# Patient Record
Sex: MALE | Race: BLACK OR AFRICAN AMERICAN | NOT HISPANIC OR LATINO | Employment: OTHER | URBAN - METROPOLITAN AREA
[De-identification: names, ages, dates, MRNs, and addresses within clinical notes are randomized per-mention and may not be internally consistent; named-entity substitution may affect disease eponyms.]

---

## 2017-12-08 ENCOUNTER — GENERIC CONVERSION - ENCOUNTER (OUTPATIENT)
Dept: OTHER | Facility: OTHER | Age: 52
End: 2017-12-08

## 2017-12-11 ENCOUNTER — ALLSCRIPTS OFFICE VISIT (OUTPATIENT)
Dept: OTHER | Facility: CLINIC | Age: 52
End: 2017-12-11

## 2017-12-11 ENCOUNTER — GENERIC CONVERSION - ENCOUNTER (OUTPATIENT)
Dept: OTHER | Facility: OTHER | Age: 52
End: 2017-12-11

## 2017-12-12 ENCOUNTER — TRANSCRIBE ORDERS (OUTPATIENT)
Dept: LAB | Facility: CLINIC | Age: 52
End: 2017-12-12

## 2017-12-12 ENCOUNTER — APPOINTMENT (OUTPATIENT)
Dept: LAB | Facility: CLINIC | Age: 52
End: 2017-12-12
Payer: MEDICARE

## 2017-12-12 DIAGNOSIS — B20 HUMAN IMMUNODEFICIENCY VIRUS I INFECTION (HCC): Primary | ICD-10-CM

## 2017-12-12 LAB
ALBUMIN SERPL BCP-MCNC: 3.8 G/DL (ref 3.5–5)
ALP SERPL-CCNC: 84 U/L (ref 46–116)
ALT SERPL W P-5'-P-CCNC: 30 U/L (ref 12–78)
ANION GAP SERPL CALCULATED.3IONS-SCNC: 4 MMOL/L (ref 4–13)
AST SERPL W P-5'-P-CCNC: 25 U/L (ref 5–45)
BACTERIA UR QL AUTO: NORMAL /HPF
BASOPHILS # BLD AUTO: 0.02 THOUSANDS/ΜL (ref 0–0.1)
BASOPHILS NFR BLD AUTO: 1 % (ref 0–1)
BILIRUB SERPL-MCNC: 0.42 MG/DL (ref 0.2–1)
BILIRUB UR QL STRIP: NEGATIVE
BUN SERPL-MCNC: 10 MG/DL (ref 5–25)
CALCIUM SERPL-MCNC: 9.2 MG/DL (ref 8.3–10.1)
CHLORIDE SERPL-SCNC: 107 MMOL/L (ref 100–108)
CHOLEST SERPL-MCNC: 170 MG/DL (ref 50–200)
CLARITY UR: CLEAR
CO2 SERPL-SCNC: 31 MMOL/L (ref 21–32)
COLOR UR: YELLOW
CREAT SERPL-MCNC: 0.77 MG/DL (ref 0.6–1.3)
EOSINOPHIL # BLD AUTO: 0.65 THOUSAND/ΜL (ref 0–0.61)
EOSINOPHIL NFR BLD AUTO: 24 % (ref 0–6)
ERYTHROCYTE [DISTWIDTH] IN BLOOD BY AUTOMATED COUNT: 19 % (ref 11.6–15.1)
EST. AVERAGE GLUCOSE BLD GHB EST-MCNC: 74 MG/DL
GFR SERPL CREATININE-BSD FRML MDRD: 121 ML/MIN/1.73SQ M
GLUCOSE P FAST SERPL-MCNC: 72 MG/DL (ref 65–99)
GLUCOSE UR STRIP-MCNC: NEGATIVE MG/DL
HBA1C MFR BLD: 4.2 % (ref 4.2–6.3)
HCT VFR BLD AUTO: 33.6 % (ref 36.5–49.3)
HDLC SERPL-MCNC: 61 MG/DL (ref 40–60)
HGB BLD-MCNC: 11.3 G/DL (ref 12–17)
HGB UR QL STRIP.AUTO: NEGATIVE
HIV 1+2 AB+HIV1 P24 AG SERPL QL IA: REACTIVE
HIV1 P24 AG SER QL: ABNORMAL
HYALINE CASTS #/AREA URNS LPF: NORMAL /LPF
KETONES UR STRIP-MCNC: NEGATIVE MG/DL
LDLC SERPL CALC-MCNC: 95 MG/DL (ref 0–100)
LEUKOCYTE ESTERASE UR QL STRIP: NEGATIVE
LYMPHOCYTES # BLD AUTO: 0.62 THOUSANDS/ΜL (ref 0.6–4.47)
LYMPHOCYTES NFR BLD AUTO: 23 % (ref 14–44)
MCH RBC QN AUTO: 30.4 PG (ref 26.8–34.3)
MCHC RBC AUTO-ENTMCNC: 33.6 G/DL (ref 31.4–37.4)
MCV RBC AUTO: 90 FL (ref 82–98)
MONOCYTES # BLD AUTO: 0.21 THOUSAND/ΜL (ref 0.17–1.22)
MONOCYTES NFR BLD AUTO: 8 % (ref 4–12)
NEUTROPHILS # BLD AUTO: 1.16 THOUSANDS/ΜL (ref 1.85–7.62)
NEUTS SEG NFR BLD AUTO: 44 % (ref 43–75)
NITRITE UR QL STRIP: NEGATIVE
NON-SQ EPI CELLS URNS QL MICRO: NORMAL /HPF
NRBC BLD AUTO-RTO: 0 /100 WBCS
PH UR STRIP.AUTO: 6.5 [PH] (ref 4.5–8)
PLATELET # BLD AUTO: 155 THOUSANDS/UL (ref 149–390)
PMV BLD AUTO: 11.3 FL (ref 8.9–12.7)
POTASSIUM SERPL-SCNC: 3.9 MMOL/L (ref 3.5–5.3)
PROT SERPL-MCNC: 8.9 G/DL (ref 6.4–8.2)
PROT UR STRIP-MCNC: NEGATIVE MG/DL
RBC # BLD AUTO: 3.72 MILLION/UL (ref 3.88–5.62)
RBC #/AREA URNS AUTO: NORMAL /HPF
SODIUM SERPL-SCNC: 142 MMOL/L (ref 136–145)
SP GR UR STRIP.AUTO: 1.01 (ref 1–1.03)
TRIGL SERPL-MCNC: 70 MG/DL
UROBILINOGEN UR QL STRIP.AUTO: 0.2 E.U./DL
WBC # BLD AUTO: 2.66 THOUSAND/UL (ref 4.31–10.16)
WBC #/AREA URNS AUTO: NORMAL /HPF

## 2017-12-12 PROCEDURE — 86317 IMMUNOASSAY INFECTIOUS AGENT: CPT

## 2017-12-12 PROCEDURE — 87340 HEPATITIS B SURFACE AG IA: CPT

## 2017-12-12 PROCEDURE — 80061 LIPID PANEL: CPT

## 2017-12-12 PROCEDURE — 87591 N.GONORRHOEAE DNA AMP PROB: CPT

## 2017-12-12 PROCEDURE — 86361 T CELL ABSOLUTE COUNT: CPT

## 2017-12-12 PROCEDURE — 86480 TB TEST CELL IMMUN MEASURE: CPT

## 2017-12-12 PROCEDURE — 87806 HIV AG W/HIV1&2 ANTB W/OPTIC: CPT

## 2017-12-12 PROCEDURE — 87901 NFCT AGT GNTYP ALYS HIV1 REV: CPT

## 2017-12-12 PROCEDURE — 85025 COMPLETE CBC W/AUTO DIFF WBC: CPT

## 2017-12-12 PROCEDURE — 86701 HIV-1ANTIBODY: CPT

## 2017-12-12 PROCEDURE — 36415 COLL VENOUS BLD VENIPUNCTURE: CPT

## 2017-12-12 PROCEDURE — 86592 SYPHILIS TEST NON-TREP QUAL: CPT

## 2017-12-12 PROCEDURE — 81001 URINALYSIS AUTO W/SCOPE: CPT

## 2017-12-12 PROCEDURE — 80053 COMPREHEN METABOLIC PANEL: CPT

## 2017-12-12 PROCEDURE — 86645 CMV ANTIBODY IGM: CPT

## 2017-12-12 PROCEDURE — 86706 HEP B SURFACE ANTIBODY: CPT

## 2017-12-12 PROCEDURE — 87536 HIV-1 QUANT&REVRSE TRNSCRPJ: CPT

## 2017-12-12 PROCEDURE — 86709 HEPATITIS A IGM ANTIBODY: CPT

## 2017-12-12 PROCEDURE — 86803 HEPATITIS C AB TEST: CPT

## 2017-12-12 PROCEDURE — 87900 PHENOTYPE INFECT AGENT DRUG: CPT

## 2017-12-12 PROCEDURE — 86702 HIV-2 ANTIBODY: CPT

## 2017-12-12 PROCEDURE — 87491 CHLMYD TRACH DNA AMP PROBE: CPT

## 2017-12-12 PROCEDURE — 83036 HEMOGLOBIN GLYCOSYLATED A1C: CPT

## 2017-12-12 PROCEDURE — 86644 CMV ANTIBODY: CPT

## 2017-12-12 PROCEDURE — 81381 HLA I TYPING 1 ALLELE HR: CPT

## 2017-12-13 ENCOUNTER — TRANSCRIBE ORDERS (OUTPATIENT)
Dept: LAB | Facility: CLINIC | Age: 52
End: 2017-12-13

## 2017-12-13 LAB
CMV IGG SERPL IA-ACNC: >10 U/ML (ref 0–0.59)
CMV IGM SERPL IA-ACNC: <30 AU/ML (ref 0–29.9)
HAV IGM SER QL: NORMAL
HBV SURFACE AB SER-ACNC: 9.68 MIU/ML
HBV SURFACE AG SER QL: NORMAL
HCV AB SER QL: NORMAL
RPR SER QL: NORMAL

## 2017-12-14 LAB
ANNOTATION COMMENT IMP: ABNORMAL
BASOPHILS # BLD AUTO: 0 X10E3/UL (ref 0–0.2)
BASOPHILS NFR BLD AUTO: 1 %
C TETANI IGG SER IA-ACNC: 0.67 IU/ML
CD3+CD4+ CELLS # BLD: 2 /UL (ref 359–1519)
CD3+CD4+ CELLS NFR BLD: 0.3 % (ref 30.8–58.5)
CHLAMYDIA DNA CVX QL NAA+PROBE: NORMAL
EOSINOPHIL # BLD AUTO: 0.6 X10E3/UL (ref 0–0.4)
EOSINOPHIL NFR BLD AUTO: 24 %
ERYTHROCYTE [DISTWIDTH] IN BLOOD BY AUTOMATED COUNT: 19.5 % (ref 12.3–15.4)
GAMMA INTERFERON BACKGROUND BLD IA-ACNC: 0.02 IU/ML
HCT VFR BLD AUTO: 33.6 % (ref 37.5–51)
HGB BLD-MCNC: 10.8 G/DL (ref 13–17.7)
HIV 2 AB SERPL QL IA: NEGATIVE
HIV 2 AB SERPL QL IA: NEGATIVE
HIV1 AB SERPL QL IA: POSITIVE
HIV1 AB SERPL QL IA: POSITIVE
HIV1 RNA # SERPL NAA+PROBE: NORMAL COPIES/ML
HIV1 RNA SERPL NAA+PROBE-LOG#: 4.68 LOG10COPY/ML
IMM GRANULOCYTES # BLD: 0 X10E3/UL (ref 0–0.1)
IMM GRANULOCYTES NFR BLD: 0 %
LYMPHOCYTES # BLD AUTO: 0.6 X10E3/UL (ref 0.7–3.1)
LYMPHOCYTES NFR BLD AUTO: 22 %
M TB IFN-G BLD-IMP: ABNORMAL
M TB IFN-G CD4+ BCKGRND COR BLD-ACNC: 0 IU/ML
M TB IFN-G CD4+ T-CELLS BLD-ACNC: 0.02 IU/ML
MCH RBC QN AUTO: 30 PG (ref 26.6–33)
MCHC RBC AUTO-ENTMCNC: 32.1 G/DL (ref 31.5–35.7)
MCV RBC AUTO: 93 FL (ref 79–97)
MITOGEN IGNF BLD-ACNC: 0.49 IU/ML
MONOCYTES # BLD AUTO: 0.2 X10E3/UL (ref 0.1–0.9)
MONOCYTES NFR BLD AUTO: 8 %
MORPHOLOGY BLD-IMP: ABNORMAL
N GONORRHOEA DNA GENITAL QL NAA+PROBE: NORMAL
NEUTROPHILS # BLD AUTO: 1.2 X10E3/UL (ref 1.4–7)
NEUTROPHILS NFR BLD AUTO: 45 %
PLATELET # BLD AUTO: 146 X10E3/UL (ref 150–379)
QUANTIFERON-TB GOLD IN TUBE: NORMAL
RBC # BLD AUTO: 3.6 X10E6/UL (ref 4.14–5.8)
SERVICE CMNT-IMP: ABNORMAL
SL AMB NOTE:: ABNORMAL
SL AMB NOTE:: ABNORMAL
WBC # BLD AUTO: 2.6 X10E3/UL (ref 3.4–10.8)

## 2017-12-18 ENCOUNTER — GENERIC CONVERSION - ENCOUNTER (OUTPATIENT)
Dept: OTHER | Facility: OTHER | Age: 52
End: 2017-12-18

## 2017-12-18 ENCOUNTER — ALLSCRIPTS OFFICE VISIT (OUTPATIENT)
Dept: OTHER | Facility: CLINIC | Age: 52
End: 2017-12-18

## 2017-12-18 LAB — HLA-B*57:01 SPEC QL: NEGATIVE

## 2017-12-19 ENCOUNTER — GENERIC CONVERSION - ENCOUNTER (OUTPATIENT)
Dept: OTHER | Facility: OTHER | Age: 52
End: 2017-12-19

## 2017-12-19 ENCOUNTER — ALLSCRIPTS OFFICE VISIT (OUTPATIENT)
Dept: OTHER | Facility: CLINIC | Age: 52
End: 2017-12-19

## 2017-12-21 LAB
HIV GENOSURE: NORMAL
HIV RT+PR MUT DET ISLT: NORMAL

## 2017-12-27 ENCOUNTER — APPOINTMENT (EMERGENCY)
Dept: RADIOLOGY | Facility: HOSPITAL | Age: 52
End: 2017-12-27
Payer: MEDICARE

## 2017-12-27 ENCOUNTER — HOSPITAL ENCOUNTER (EMERGENCY)
Facility: HOSPITAL | Age: 52
Discharge: HOME | End: 2017-12-27
Attending: EMERGENCY MEDICINE | Admitting: EMERGENCY MEDICINE
Payer: MEDICARE

## 2017-12-27 VITALS
SYSTOLIC BLOOD PRESSURE: 111 MMHG | WEIGHT: 155 LBS | TEMPERATURE: 98.1 F | RESPIRATION RATE: 18 BRPM | DIASTOLIC BLOOD PRESSURE: 71 MMHG | HEART RATE: 90 BPM | OXYGEN SATURATION: 99 %

## 2017-12-27 DIAGNOSIS — G89.4 CHRONIC PAIN SYNDROME: Primary | ICD-10-CM

## 2017-12-27 PROCEDURE — 71020 HB CHEST X-RAY 2VW FRONTAL&LATL: CPT

## 2017-12-27 PROCEDURE — 99285 EMERGENCY DEPT VISIT HI MDM: CPT

## 2017-12-27 PROCEDURE — 93005 ELECTROCARDIOGRAM TRACING: CPT

## 2017-12-27 NOTE — DISCHARGE INSTRUCTIONS
Diagnosis: chronic shoulder pain     - activity as tolerated     - please call your primary doctor to schedule an appt  For the chronic shoulder pain ----  If you do not have a doctor - please call   The infolink number to get  Assistance    - please return to  The er f or any new/ worsening/concerning symptoms to you

## 2017-12-27 NOTE — ED PROCEDURE NOTE
PROCEDURE  ECG 12 Lead Documentation  Date/Time: 12/27/2017 12:20 PM  Performed by: Dorothy Olson  Authorized by: Trinity TODD     Indications / Diagnosis:   ms cp  ECG reviewed by me, the ED Provider: yes    Patient location:  ED and bedside  Previous ECG:     Previous ECG:  Unavailable  Interpretation:     Interpretation: non-specific    Rate:     ECG rate:  85    ECG rate assessment: normal    Rhythm:     Rhythm: sinus rhythm    Ectopy:     Ectopy: none    QRS:     QRS axis:  Normal    QRS intervals:  Normal  Conduction:     Conduction: normal    ST segments:     ST segments:  Normal  T waves:     T waves: flattening      Flattening:  II, III, aVF, V1 and V5  Q waves:     Q waves:  V1  Other findings:     Other findings: U wave    Comments:      No ecg  Signs of ischemia/ injury /  r heart strain/ babak/pericarditis

## 2017-12-27 NOTE — ED PROVIDER NOTES
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 and is being followed by infectious disease  He states he was recently admitted in November for a PE and DVT in his left leg  He was started on Eliquis after discharge, but recently changed to Pradaxa a week ago due to HIV medication interactions  Patient states the leg swelling, which is bilateral and above the knee to his hips, has worsened since discharge, but denies any pain  He has a history of being non-compliant with medications, but states he has not missed any pradaxa doses  He describes the chest pain as starting in his shoulders and radiating to the center of his chest  He has RA and states the pain is similar to what he has had in the past, but worsened last night  He has had improvement with heat and epsom salt soaks  He has been experiencing cough and SOB, but states this is his baseline due to asthma  He denies any fevers, abdominal pain, N/V/D, or other complaints at this time  None       Past Medical History:   Diagnosis Date    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 ([facility] [address] )     Hypertension     Stroke [facility])        Past Surgical History:   Procedure Laterality Date    HIP SURGERY         History reviewed  No pertinent family history  I have reviewed and agree with the history as documented  Social History   Substance Use Topics    Smoking status: Current Some Day Smoker    Smokeless tobacco: Not on file    Alcohol use Yes        Review of Systems   Constitutional: Negative for chills and fever  HENT: Negative for congestion, ear pain, sinus pain and sore throat  Respiratory: Positive for cough and shortness of breath  Negative for wheezing  Cardiovascular: Positive for leg swelling  Negative for chest pain  Gastrointestinal: Negative for abdominal pain, diarrhea, nausea and vomiting  Genitourinary: Negative  Musculoskeletal: Positive for arthralgias and myalgias  Skin: Negative  Allergic/Immunologic: Positive for immunocompromised state  Neurological: Negative for dizziness, syncope, weakness and headaches  Psychiatric/Behavioral: Negative  Physical Exam  ED Triage Vitals [12/27/17 1056]   Temperature Pulse Respirations Blood Pressure SpO2   98 1 °F (36 7 °C) 90 18 111/71 99 %      Temp Source Heart Rate Source Patient Position - Orthostatic VS BP Location FiO2 (%)   Oral Monitor Sitting Right arm --      Pain Score       --           Orthostatic Vital Signs  Vitals:    12/27/17 1056   BP: 111/71   Pulse: 90   Patient Position - Orthostatic VS: Sitting       Physical Exam   Constitutional: He is oriented to person, place, and time  Vital signs are normal  He appears well-developed  He appears cachectic  HENT:   Head: Normocephalic and atraumatic  Eyes: EOM are normal  Pupils are equal, round, and reactive to light  Neck: Normal range of motion  Neck supple  Cardiovascular: Normal rate, regular rhythm, normal heart sounds and intact distal pulses  Exam reveals no gallop and no friction rub  No murmur heard  Pulmonary/Chest: Effort normal and breath sounds normal  No respiratory distress  He has no wheezes  He has no rales  He exhibits tenderness  Abdominal: Soft  Bowel sounds are normal  He exhibits no distension  There is no tenderness  There is no rebound and no guarding  Musculoskeletal: Normal range of motion  Neurological: He is alert and oriented to person, place, and time  Skin: Skin is warm and dry  Psychiatric: He has a normal mood and affect   His behavior is normal  Judgment and thought content normal        ED Medications  Medications - No data to display    Diagnostic Studies  Results Reviewed     None                 XR chest pa & lateral    (Results Pending)              Procedures  ECG 12 Lead Documentation  Date/Time: 12/27/2017 11:56 AM  Performed by: Da Cleaning  Authorized by: Jovi TODD     ECG reviewed by me, the ED Provider: yes    Patient location:  ED  Previous ECG:     Previous ECG:  Unavailable  Interpretation:     Interpretation: normal    Rate:     ECG rate:  85    ECG rate assessment: normal    Rhythm:     Rhythm: sinus rhythm    Ectopy:     Ectopy: none    QRS:     QRS axis:  Normal    QRS intervals:  Normal  Conduction:     Conduction: normal    ST segments:     ST segments:  Normal  T waves:     T waves: non-specific and flattening      Flattening:  II, III and aVF             Phone Contacts  ED Phone Contact    ED Course  ED Course          MDM  Number of Diagnoses or Management Options  Chronic pain syndrome:      Amount and/or Complexity of Data Reviewed  Tests in the radiology section of CPT®: ordered and reviewed  Review and summarize past medical records: yes  Independent visualization of images, tracings, or specimens: yes    Risk of Complications, Morbidity, and/or Mortality  Presenting problems: low  Diagnostic procedures: low  Management options: low    Patient Progress  Patient progress: stable    CritCare Time    Disposition  Final diagnoses:   Chronic pain syndrome     Time reflects when diagnosis was documented in both MDM as applicable and the Disposition within this note     Time User Action Codes Description Comment    12/27/2017 12:29 PM Lucia Keys Add [G89 4] Chronic pain syndrome       ED Disposition     ED Disposition Condition Comment    Discharge  Zay Taveras discharge to home/self care  Condition at discharge: Good        Follow-up Information    None       There are no discharge medications for this patient  No discharge procedures on file      ED Provider  Electronically Signed by           Colonel Isis PA-C  12/27/17 0486

## 2017-12-28 LAB
ATRIAL RATE: 89 BPM
MISCELLANEOUS LAB TEST RESULT: NORMAL
P AXIS: 73 DEGREES
PR INTERVAL: 142 MS
QRS AXIS: -7 DEGREES
QRSD INTERVAL: 94 MS
QT INTERVAL: 370 MS
QTC INTERVAL: 440 MS
T WAVE AXIS: 21 DEGREES
VENTRICULAR RATE: 85 BPM

## 2018-01-08 ENCOUNTER — ALLSCRIPTS OFFICE VISIT (OUTPATIENT)
Dept: OTHER | Facility: CLINIC | Age: 53
End: 2018-01-08

## 2018-01-16 ENCOUNTER — APPOINTMENT (OUTPATIENT)
Dept: LAB | Facility: CLINIC | Age: 53
End: 2018-01-16
Payer: COMMERCIAL

## 2018-01-16 ENCOUNTER — TRANSCRIBE ORDERS (OUTPATIENT)
Dept: LAB | Facility: CLINIC | Age: 53
End: 2018-01-16

## 2018-01-16 DIAGNOSIS — B20 HUMAN IMMUNODEFICIENCY VIRUS (HIV) DISEASE (HCC): ICD-10-CM

## 2018-01-16 LAB
ALBUMIN SERPL BCP-MCNC: 4.4 G/DL (ref 3.5–5)
ALP SERPL-CCNC: 80 U/L (ref 46–116)
ALT SERPL W P-5'-P-CCNC: 22 U/L (ref 12–78)
ANION GAP SERPL CALCULATED.3IONS-SCNC: 5 MMOL/L (ref 4–13)
AST SERPL W P-5'-P-CCNC: 20 U/L (ref 5–45)
BASOPHILS # BLD AUTO: 0.02 THOUSANDS/ΜL (ref 0–0.1)
BASOPHILS NFR BLD AUTO: 0 % (ref 0–1)
BILIRUB SERPL-MCNC: 0.52 MG/DL (ref 0.2–1)
BUN SERPL-MCNC: 13 MG/DL (ref 5–25)
CALCIUM SERPL-MCNC: 9.8 MG/DL (ref 8.3–10.1)
CHLORIDE SERPL-SCNC: 105 MMOL/L (ref 100–108)
CO2 SERPL-SCNC: 29 MMOL/L (ref 21–32)
CREAT SERPL-MCNC: 1.07 MG/DL (ref 0.6–1.3)
EOSINOPHIL # BLD AUTO: 1.32 THOUSAND/ΜL (ref 0–0.61)
EOSINOPHIL NFR BLD AUTO: 29 % (ref 0–6)
ERYTHROCYTE [DISTWIDTH] IN BLOOD BY AUTOMATED COUNT: 17 % (ref 11.6–15.1)
GFR SERPL CREATININE-BSD FRML MDRD: 92 ML/MIN/1.73SQ M
GLUCOSE P FAST SERPL-MCNC: 81 MG/DL (ref 65–99)
HCT VFR BLD AUTO: 35.8 % (ref 36.5–49.3)
HGB BLD-MCNC: 12.5 G/DL (ref 12–17)
LYMPHOCYTES # BLD AUTO: 1 THOUSANDS/ΜL (ref 0.6–4.47)
LYMPHOCYTES NFR BLD AUTO: 22 % (ref 14–44)
MCH RBC QN AUTO: 31.4 PG (ref 26.8–34.3)
MCHC RBC AUTO-ENTMCNC: 34.9 G/DL (ref 31.4–37.4)
MCV RBC AUTO: 90 FL (ref 82–98)
MONOCYTES # BLD AUTO: 0.21 THOUSAND/ΜL (ref 0.17–1.22)
MONOCYTES NFR BLD AUTO: 5 % (ref 4–12)
NEUTROPHILS # BLD AUTO: 1.95 THOUSANDS/ΜL (ref 1.85–7.62)
NEUTS SEG NFR BLD AUTO: 44 % (ref 43–75)
NRBC BLD AUTO-RTO: 0 /100 WBCS
PLATELET # BLD AUTO: 226 THOUSANDS/UL (ref 149–390)
PMV BLD AUTO: 10.3 FL (ref 8.9–12.7)
POTASSIUM SERPL-SCNC: 4 MMOL/L (ref 3.5–5.3)
PROT SERPL-MCNC: 9.2 G/DL (ref 6.4–8.2)
RBC # BLD AUTO: 3.98 MILLION/UL (ref 3.88–5.62)
SODIUM SERPL-SCNC: 139 MMOL/L (ref 136–145)
WBC # BLD AUTO: 4.51 THOUSAND/UL (ref 4.31–10.16)

## 2018-01-16 PROCEDURE — 36415 COLL VENOUS BLD VENIPUNCTURE: CPT

## 2018-01-16 PROCEDURE — 86708 HEPATITIS A ANTIBODY: CPT

## 2018-01-16 PROCEDURE — 80053 COMPREHEN METABOLIC PANEL: CPT

## 2018-01-16 PROCEDURE — 86361 T CELL ABSOLUTE COUNT: CPT

## 2018-01-16 PROCEDURE — 86778 TOXOPLASMA ANTIBODY IGM: CPT

## 2018-01-16 PROCEDURE — 86777 TOXOPLASMA ANTIBODY: CPT

## 2018-01-16 PROCEDURE — 85025 COMPLETE CBC W/AUTO DIFF WBC: CPT

## 2018-01-16 PROCEDURE — 87536 HIV-1 QUANT&REVRSE TRNSCRPJ: CPT

## 2018-01-17 LAB
BASOPHILS # BLD AUTO: 0 X10E3/UL (ref 0–0.2)
BASOPHILS NFR BLD AUTO: 1 %
CD3+CD4+ CELLS # BLD: 40 /UL (ref 359–1519)
CD3+CD4+ CELLS NFR BLD: 4 % (ref 30.8–58.5)
CLINICAL COMMENT: NORMAL
EOSINOPHIL # BLD AUTO: 1.2 X10E3/UL (ref 0–0.4)
EOSINOPHIL NFR BLD AUTO: 30 %
ERYTHROCYTE [DISTWIDTH] IN BLOOD BY AUTOMATED COUNT: 18 % (ref 12.3–15.4)
HAV AB SER QL IA: NORMAL
HCT VFR BLD AUTO: 37.7 % (ref 37.5–51)
HGB BLD-MCNC: 13.1 G/DL (ref 13–17.7)
IMM GRANULOCYTES # BLD: 0 X10E3/UL (ref 0–0.1)
IMM GRANULOCYTES NFR BLD: 0 %
LYMPHOCYTES # BLD AUTO: 1 X10E3/UL (ref 0.7–3.1)
LYMPHOCYTES NFR BLD AUTO: 24 %
MCH RBC QN AUTO: 32 PG (ref 26.6–33)
MCHC RBC AUTO-ENTMCNC: 34.7 G/DL (ref 31.5–35.7)
MCV RBC AUTO: 92 FL (ref 79–97)
MONOCYTES # BLD AUTO: 0.2 X10E3/UL (ref 0.1–0.9)
MONOCYTES NFR BLD AUTO: 5 %
MORPHOLOGY BLD-IMP: ABNORMAL
NEUTROPHILS # BLD AUTO: 1.6 X10E3/UL (ref 1.4–7)
NEUTROPHILS NFR BLD AUTO: 40 %
PLATELET # BLD AUTO: 204 X10E3/UL (ref 150–379)
RBC # BLD AUTO: 4.1 X10E6/UL (ref 4.14–5.8)
T GONDII IGG SERPL IA-ACNC: <3 IU/ML (ref 0–7.1)
T GONDII IGM SER IA-ACNC: <3 AU/ML (ref 0–7.9)
WBC # BLD AUTO: 4 X10E3/UL (ref 3.4–10.8)

## 2018-01-18 LAB
HIV1 RNA # SERPL NAA+PROBE: 60 COPIES/ML
HIV1 RNA SERPL NAA+PROBE-LOG#: 1.78 LOG10COPY/ML

## 2018-01-22 VITALS
WEIGHT: 149.31 LBS | TEMPERATURE: 98 F | DIASTOLIC BLOOD PRESSURE: 82 MMHG | SYSTOLIC BLOOD PRESSURE: 110 MMHG | HEART RATE: 88 BPM | HEIGHT: 71 IN | BODY MASS INDEX: 20.9 KG/M2

## 2018-01-22 VITALS
HEART RATE: 110 BPM | SYSTOLIC BLOOD PRESSURE: 130 MMHG | HEIGHT: 71 IN | WEIGHT: 156.38 LBS | BODY MASS INDEX: 21.89 KG/M2 | OXYGEN SATURATION: 97 % | TEMPERATURE: 97.9 F | DIASTOLIC BLOOD PRESSURE: 80 MMHG

## 2018-01-23 VITALS
HEIGHT: 71 IN | BODY MASS INDEX: 20.32 KG/M2 | HEART RATE: 87 BPM | DIASTOLIC BLOOD PRESSURE: 70 MMHG | WEIGHT: 145.13 LBS | OXYGEN SATURATION: 97 % | SYSTOLIC BLOOD PRESSURE: 128 MMHG | TEMPERATURE: 97.4 F

## 2018-01-23 NOTE — PROGRESS NOTES
Assessment    1  AIDS (042) (B20)   2  Pulmonary embolism (415 19) (I26 99)   3  Gastritis (535 50) (K29 70)    Plan    1  Eliquis 5 MG Oral Tablet; Take 1 tablet twice daily    2  Magnesium Oxide 400 MG Oral Tablet; TAKE 1 TABLET Every 8 hours    3  Cyclobenzaprine HCl - 10 MG Oral Tablet; TAKE 1 TABLET TWICE DAILY AS   NEEDED   4  Tylenol 325 MG Oral Tablet; TAKE 2 TABLET Every 6 hours PRN for headache, pain,   fever    5  Azithromycin 600 MG Oral Tablet; TAKE 2 TABLETS ONCE WEEKLY   6  Sulfamethoxazole-Trimethoprim 800-160 MG Oral Tablet; TAKE 1 TABLET BY   MOUTH DAILY ON MONDAY, WEDNESDAY, AND FRIDAY    Discussion/Summary  Discussion Summary:   Jocy Quiroz is a 47year old male here today for one week f/u PCP visit  AIDS: CD4 2 VL 48,230  Not currently on ART  Previous ART in 12/15/15 was Norvir, Prezista, and Truvada  Medication confirmed from previous ID records    Scheduled for ID clinic tomorrow  Educated to contact clinic immediately if he develops a fever or experiences respiratory distress  Low tolerance for sending to ED for medical treatment  Repeat CXR when he receives medical benefits  DVT/PE: Continue apixaban  Anemia: Will need additional lab studies as viral load decreases    Refer to heme/onc when he obtains insurance  HTN: Stable  /70 Using mother's wrist BP cuff at home and it is most likely not accurate  Will reassess tomorrow at ID clinic  Encouraged to quit smoking  Right hip pain: 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  Will need to have right hip replacement  For now will provide symptomatic treatment  Cyclobenzaprine has been effective in controlling pain in the past  Hesitant to offer narcotics due to patient's recent drug history  Protein calorie malnutrition: BMI 20 82  Encouraged a healthy well balanced diet  Refer to dietician for additional education  Weight loss may be a factor of AIDS wasting   Will most likely see improvement after pt starts ART  Given samples of Nutritional supplements today  Nicotine dependence: Educated on the dangers of smoking  Advised to quit  referred to [address] for enrollment in smoking cessation program      PCP f/u scheduled for three weeks              Counseling Documentation With Imm: The patient was counseled regarding instructions for management, risks and benefits of treatment options, importance of compliance with treatment  Medication SE Review and Pt Understands Tx: Possible side effects of new medications were reviewed with the patient/guardian today  The treatment plan was reviewed with the patient/guardian  The patient/guardian understands and agrees with the treatment plan      Chief Complaint  Chief Complaint Free Text Note Form: Per pt he took his BP yesterday morning and it was 152/106 (left arm) and 163/120 (right arm)  Pt c/o increased SOB when climbing steps x 2-3 days  Pt c/o pain and swelling bilateral legs x 2 months  Pt c/o facial itching x 2 months  History of Present Illness  HPI: Lana Hung is a 47year old male here today for one week PCP f/u  PMHx signifcant for AIDs, PE, anemnia, asthma, and HTN  Per pt he took his BP yesterday morning and it was 152/106 (left arm) and 163/120 (right arm)  Also c/o increased SOB when climbing steps x 2-3 days  Ramya Alpha is c/o pain and swelling bilateral legs x 2 months  Ramya Alpha is also c/o facial itching x 2 months  Hospital Based Practices Required Assessment:   Pain Assessment   the patient states they have pain  The pain is located in the bilateral legs  The patient describes the pain as aching  (on a scale of 0 to 10, the patient rates the pain at 9 )   Abuse And Domestic Violence Screen    Yes, the patient is safe at home  The patient states no one is hurting them  Depression And Suicide Screen  No, the patient has not had thoughts of hurting themself  No, the patient has not felt depressed in the past 7 days  Active Problems     1  Acute pain (338 19) (R52)   2  Anemia (285 9) (D64 9)   3  Arthritis (716 90) (M19 90)   4  Asthma (493 90) (J45 909)   5  DVT (deep venous thrombosis) (453 40) (I82 409)   6  Gastritis (535 50) (K29 70)   7  Hip pain, right (719 45) (M25 551)   8  HIV disease (042) (B20)   9  Hypertension (401 9) (I10)   10  Hypoalbuminemia (273 8) (E88 09)   11  Hypokalemia (276 8) (E87 6)   12  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)   13  Nicotine dependence (305 1) (F17 200)   14  Pneumonia (5) (J18 9)   15  Pulmonary embolism (415 19) (I26 99)    AIDS (042) (B20)       Protein-calorie malnutrition (263 9) (E46)          Past Medical History    1  History of Closed fracture of left hip with malunion, subsequent encounter (733 81)   (S72 002P)   2  History of bursitis (V13 59) (Z87 39)   3  History of candidiasis of mouth (V12 09) (Z86 19)   4  History of erectile dysfunction (V13 89) (Z87 438)   5  History of Mycobacterium avium complex infection (V12 09) (Z86 19)   6  History of shingles (V12 09) (Z86 19)   7  History of stroke (V12 54) (Z86 73)   8  History of Obstructive sleep apnea, adult (327 23) (G47 33)    Family History  Mother    1  Family history of diabetes mellitus (V18 0) (Z83 3)   2  Family history of hypertension (V17 49) (Z82 49)  Father    3  Family history of hepatic cirrhosis (V18 59) (Z83 79)   4  Family history of malignant neoplasm of prostate (V16 42) (Z80 42)  Uncle    5  Family history of tuberculosis (V18 8) (Z83 1)    Social History    · Cocaine use (305 60) (F14 90)   · Current every day smoker (305 1) (F17 200)    Surgical History    1  History of Hip Replacement Left    Current Meds   1  Azithromycin 600 MG Oral Tablet; TAKE 2 TABLETS ONCE WEEKLY; Therapy: (Recorded:77Rnn4442) to Recorded   2  Eliquis 5 MG Oral Tablet; Take 1 tablet twice daily; Therapy: (Recorded:76Amy4135) to Recorded   3  Magnesium Oxide 400 MG Oral Tablet; TAKE 1 TABLET Every 8 hours;    Therapy: (Recorded:59Wiz1216) to Recorded   4  Sulfamethoxazole-TMP DS TABS; 1 tablet 3 times per week; Therapy: (Recorded:19Kya7704) to Recorded    Allergies    1  Penicillins    2  Food   3  TOMATO    Vitals  Signs   Recorded: 36DYE4326 01:12PM   Temperature: 97 4 F  Heart Rate: 87  Systolic: 255  Diastolic: 70  Height: 5 ft 11 in  Weight: 145 lb 2 oz  BMI Calculated: 20 24  BSA Calculated: 1 84  O2 Saturation: 97    Results/Data  Longitudinal Record 59Zqe8518 04:11PM      Test Name Result Flag Reference   HLA- negative         Attending Note  Collaborating Physician Note: Agree with Advanced Practitioner Note Except: ID appt today, will d/w CRNP  ? need for CXR given S/S  Fortunately 97%  Recheck BP normal  Close monitoring        Future Appointments    Date/Time Provider Specialty Site   12/19/2017 05:30 PM Stanley Avitia MD Infectious Disease ASC AT [facility]   01/08/2018 10:30 AM MANDY Iniguez Epidemiology/Public Health ASC AT [address] [facility]   Electronically signed by : Arun Ortega; Dec 18 2017  5:21PM EST                       (Author)    Electronically signed by : Deuce Da Silva DO; Dec 19 2017 12:48PM EST                       (Author)

## 2018-01-23 NOTE — PROGRESS NOTES
Assessment    1  AIDS (042) (B20)   2  Protein-calorie malnutrition (263 9) (E46)   3  Underweight (783 22) (R63 6)    Discussion/Summary    Intervention Diet Prescription   Energy 1950 kcal   25kcal /78kg   Protein 85g   1 1g /78kg   Fluid 1950ml   25ml /78kg 2 0g Na IBW of 78kg used for nutritional needs at this time due to AIDS status  Estimated Intake: 1700kcal 65g Protein    Goal #1 - Improve/Maintain  weight, protein and energy intake  Goal initiated  Time Frame For Accomplishment: By next follow-up  Intervention Nutrition Education Provided  Person Educated: patient   Topics Discussed: Oral Supplement, protein and Adequate energy intake   Barriers To Learning: economic  Readiness to Learn: Marginal Reception  Teaching Method: verbal   Evaluation Of Learning: needs reinforcement   Jaleel Faulkner was seen today for his initial assessment, new to care at clinic  Currently at 66kg, PT is at 84% of his IBW of 78kg, moderate deficit  PT reports feeling fatigued  Discussed use of oral supplements  Jaleel Faulkner indicates getting Boost VHP while in the hospital  Currently not taking any supplements  Provided sample bag of oral supplements (and coupons) for Jaleel Faulkner to use in conjunction with his normal nutrient intake from foods  Dietary recall indicates Barak's appetite is good  Has recently applied for food stamps and was given emergency stamps for this month  Food allergies to tomatoes and pineapple  Will continue to monitor wt, intake, need for supplements as PT begins ART therapy  History of Present Illness  Assessment: Clinical Data/Client History HIV - No  AIDS - Yes  His socio-economic status includes  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  He lives in [facility] house  Living Environment - He has access to refrigerator, stove and microwave  Psycho-Social factors are  substance abuse  His functional status is  ambulatory, able to food shop, prepares own meals and Lives with brother and mother who also cook     His activity level is  fatigued  He eats breakfast at  Oatmeal, grits with butter, toast, eggs, sausage, coffee or hot chocolate  He eats lunch at  Cereal or leftover food from dinner, iced tea, orange or apple  He eats dinner at  Chicken or steak, corn or broccoli, rice, water  His appetite is  fair   He takes supplements  He has problems with  PT has not had dental care since 1989, will follow up with CM  Nutrition Diagnosis   Underweight r/t AIDS as evidenced by protein-calorie malnutrition, 84% of IBW moderate deficit  Active Problems    1  Acute pain (338 19) (R52)   2  AIDS (042) (B20)   3  Anemia (285 9) (D64 9)   4  Arthritis (716 90) (M19 90)   5  Asthma (493 90) (J45 909)   6  DVT (deep venous thrombosis) (453 40) (I82 409)   7  Gastritis (535 50) (K29 70)   8  Hip pain, right (719 45) (M25 551)   9  HIV disease (042) (B20)   10  Hypertension (401 9) (I10)   11  Hypoalbuminemia (273 8) (E88 09)   12  Hypokalemia (276 8) (E87 6)   13  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)   14  Nicotine dependence (305 1) (F17 200)   15  Pneumonia (486) (J18 9)   16  Protein-calorie malnutrition (263 9) (E46)   17  Pulmonary embolism (415 19) (I26 99)    Past Medical History    1  History of Closed fracture of left hip with malunion, subsequent encounter (733 81) (S72 002P)   2  History of bursitis (V13 59) (Z87 39)   3  History of candidiasis of mouth (V12 09) (Z86 19)   4  History of erectile dysfunction (V13 89) (Z87 438)   5  History of Mycobacterium avium complex infection (V12 09) (Z86 19)   6  History of shingles (V12 09) (Z86 19)   7  History of stroke (V12 54) (Z86 73)   8  History of Obstructive sleep apnea, adult (327 23) (G47 33)    Surgical History    1  History of Hip Replacement Left    Family History  Mother    1  Family history of diabetes mellitus (V18 0) (Z83 3)   2  Family history of hypertension (V17 49) (Z82 49)  Father    3  Family history of hepatic cirrhosis (V18 59) (Z83 79)   4   Family history of malignant neoplasm of prostate (V16 42) (Z80 42)  Uncle    5  Family history of tuberculosis (V18 8) (Z83 1)    Social History    · Cocaine use (305 60) (F14 90)   · Current every day smoker (305 1) (F17 200)    Current Meds   1  Azithromycin 600 MG Oral Tablet; TAKE 2 TABLETS ONCE WEEKLY  Requested for: 66TXF9297;   Last Rx:[id number] Ordered   2  Cyclobenzaprine HCl - 10 MG Oral Tablet; TAKE 1 TABLET TWICE DAILY AS NEEDED; Therapy: 04CLB6849 to (Evaluate:17Jan2018)  Requested for: 57AGW1194; Last Rx:99Lun9340   Ordered   3  Eliquis 5 MG Oral Tablet; Take 1 tablet twice daily  Requested for: 80QFG1681; Last Rx:88Okm5346   Ordered   4  Magnesium Oxide 400 MG Oral Tablet; TAKE 1 TABLET Every 8 hours; Last Rx:24Ahy6096 Ordered   5  Sulfamethoxazole-Trimethoprim 800-160 MG Oral Tablet; TAKE 1 TABLET BY MOUTH DAILY ON   MONDAY, WEDNESDAY, AND FRIDAY  Requested for: 48LNR7389; Last Rx:[id number] Ordered   6  Tylenol 325 MG Oral Tablet; TAKE 2 TABLET Every 6 hours PRN for headache, pain, fever; Therapy: 85TZZ6438 to (Evaluate:17Jan2018)  Requested for: 73XGF1655; Last Rx:16Gck1752   Ordered    Allergies    1  Penicillins    2  Food   3  TOMATO    Vitals  Vitals   Recorded: 83XSC0961 30:38US   Systolic: 859  Diastolic: 70  Temperature: 97 4 F  Heart Rate: 87  Height: 5 ft 11 in  Weight: 145 lb 2 oz  BMI Calculated: 20 24  BSA Calculated: 1 84  O2 Saturation: 97  Recorded: 67BVP8359 86:71YI   Systolic: 455  Diastolic: 82  Temperature: 98 F  Heart Rate: 88  Height: 5 ft 11 in  Weight: 149 lb 5 oz  BMI Calculated: 20 82  BSA Calculated: 1 86    Future Appointments    Date/Time Provider Specialty Site   12/19/2017 05:30 PM Geovanna Guerra MD Infectious Disease ASC AT [facility]   01/08/2018 10:30 AM MANDY [place] Epidemiology/Public Health [place] [address] AT [address] [facility]   Electronically signed by :  JARETT Acharya; Dec 18 2017  4:07PM EST                       (Author)

## 2018-01-23 NOTE — PROGRESS NOTES
Assessment    1  Insomnia (780 52) (G47 00)   2  DVT (deep venous thrombosis) (453 40) (I82 409)   3  AIDS (042) (B20)   4  Hip pain, right (719 45) (M25 551)   5  Asthma (493 90) (J45 909)   6  Nicotine dependence (305 1) (F17 200)   7  Protein-calorie malnutrition (263 9) (E46)    Plan    1  Descovy 200-25 MG Oral Tablet; Take 1 tablet daily   2  Prezcobix 800-150 MG Oral Tablet; take 1 tablet by mouth daily   3  Tivicay 50 MG Oral Tablet; take 1 tablet by mouth daily    4  Ventolin  (90 Base) MCG/ACT Inhalation Aerosol Solution; INHALE 1 TO 2   PUFF(S) EVERY 4-6 HOURS AS NEEDED    5  Pradaxa 150 MG Oral Capsule; TAKE 1 CAPSULE TWICE DAILY    6  Magnesium Oxide 400 MG Oral Tablet; TAKE 1 TABLET Every 8 hours    7  Cyclobenzaprine HCl - 10 MG Oral Tablet; TAKE 1 TABLET TWICE DAILY AS   NEEDED   8  Tylenol 325 MG Oral Tablet; TAKE 2 TABLET Every 6 hours PRN for headache,   pain, fever    9  Azithromycin 600 MG Oral Tablet; TAKE 2 TABLETS ONCE WEEKLY   10  Sulfamethoxazole-Trimethoprim 800-160 MG Oral Tablet; TAKE 1 TABLET BY    MOUTH DAILY ON MONDAY, WEDNESDAY, AND FRIDAY    11  DiphenhydrAMINE HCl - 25 MG Oral Tablet; Take 1-2 tablets at night    Discussion/Summary  Discussion Summary:   Gerald Castillo is a 47year old male here today 4 week PCP f/u of chronic conditions  AIDS: CD4 2 VL 48,230  Started Tivicay and Descovy 12/19/17  Continue OI prophylaxis Bactrim and Azithromycin  Denies side effects and is tolerating medication well  Denies missing doses  Having difficulty getting to local pharmacy due to transportation issues  From now on will use [facility] pharmacy and medication will be delivered to his home  Stressed the importance of adherence  Reminded to have labs completed 1/16/18 and attend scheduled f/u ID appointment 1/30/18  DVT/PE: Anticoagulation changed to Pradaxa due to contraindications with combination of Eliquis and ART  Doing well on Pradaxa and denies side effects   Evaluated at [facility] 12/27/2017 for chest pain  ECG sinus rhythm without ischemia, and Bertha Landrum was discharged home  Instructed to continue with Pradaxa  Anemia: Will need additional lab studies as viral load decreases    Refer to heme/onc when he obtains insurance  HTN: Stable  /80  Educated to eat a low-salt diet  Encouraged to quit smoking  Right hip pain: 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  Will need to have right hip replacement  For now will provide symptomatic treatment  Cyclobenzaprine has been effective in controlling pain in the past  Continue to use acetaminophen for pain relief  Educated to avoid NSAIDs due to risk of internal bleeding when combined with Pradaxa  Refer to orthopedics as CD4 stabilizes  Protein calorie malnutrition: Recent 10lb weight gain  BMI 21 81  Encouraged a healthy well balanced diet  Refer to dietician for additional education  Weight loss may be a factor of AIDS wasting  Will most likely see improvement as pt continues ART  Nicotine dependence: Greatly reduced number of cigarette smoked  Encouraged continued progress towards cessation goals   Educated on the dangers of smoking, especially with the comorbidities of hypertension and asthma    Advised to quit  Continue to follow [address]la [address] in smoking cessation program      Asthma: Ordered a PRN Ventolin inhaler  If cough does not improve, will send for PFTs and pursue additional treatment  Insomnia/facial rash: Ordered Benadryl at HS  PCP f/u scheduled for three weeks              Counseling Documentation With Imm: The patient was counseled regarding diagnostic results, instructions for management, risk factor reductions, risks and benefits of treatment options, importance of compliance with treatment  Medication SE Review and Pt Understands Tx: Possible side effects of new medications were reviewed with the patient/guardian today   The treatment plan was reviewed with the patient/guardian  The patient/guardian understands and agrees with the treatment plan      Chief Complaint  Chief Complaint Free Text Note Form: Pt c/o runny nose and productive cough x 1 1/2 weeks  History of Present Illness  HPI: Bhavani Louis Is a 36-year-old male who presents to the clinic today for primary care follow-up of chronic conditions  past medical history significant for AIDS, asthma, DVT, right hip pain, hypertension, nicotine dependence, moderate protein calorie malnutrition, and insomnia  Derian Barnard c/o runny nose and productive cough x 1 1/2 weeks  Symptoms are slowly improving with supportive measures  Derian Barnard also continues to c/o R hip pain that makes it difficult for him to sleep at night  Hospital Based Practices Required Assessment:   Pain Assessment   the patient states they have pain  The pain is located in the bilateral shoulders and whole right side of body  The patient describes the pain as aching  (on a scale of 0 to 10, the patient rates the pain at 9 )   Abuse And Domestic Violence Screen    Yes, the patient is safe at home  The patient states no one is hurting them  Depression And Suicide Screen  No, the patient has not had thoughts of hurting themself  No, the patient has not felt depressed in the past 7 days  Review of Systems  Focused-Male:   Constitutional: feeling tired, but no fever, not feeling poorly and no chills  ENT: nasal discharge, but no earache, no nosebleeds, no sore throat, no hearing loss and no hoarseness  Cardiovascular: lower extremity edema, but the heart rate was not slow, no chest pain, no intermittent leg claudication, the heart rate was not fast and no palpitations  Respiratory: cough, but no shortness of breath and no wheezing  Gastrointestinal: no complaints of abdominal pain, no constipation, no nausea or vomiting, no diarrhea or bloody stools     Genitourinary: no complaints of dysuria or incontinence, no hesitancy, no nocturia, no genital lesion, no inadequacy of penile erection  Musculoskeletal: limb pain, joint stiffness and limb swelling, but no joint swelling  Integumentary: a rash, itching and pruritic facial rash, slowly improving , but no skin lesions and no skin wound  Neurological: no complaints of headache, no confusion, no numbness or tingling, no dizziness or fainting  Active Problems    1  Acute pain (338 19) (R52)   2  AIDS (042) (B20)   3  Anemia (285 9) (D64 9)   4  Arthritis (716 90) (M19 90)   5  Asthma (493 90) (J45 909)   6  DVT (deep venous thrombosis) (453 40) (I82 409)   7  Gastritis (535 50) (K29 70)   8  Hip pain, right (719 45) (M25 551)   9  HIV disease (042) (B20)   10  Hypertension (401 9) (I10)   11  Hypoalbuminemia (273 8) (E88 09)   12  Hypokalemia (276 8) (E87 6)   13  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)   14  Nicotine dependence (305 1) (F17 200)   15  Night sweats (780 8) (R61)   16  Pneumonia (486) (J18 9)   17  Protein-calorie malnutrition (263 9) (E46)   18  Pulmonary embolism (415 19) (I26 99)   19  Underweight (783 22) (R63 6)    Past Medical History    1  History of Closed fracture of left hip with malunion, subsequent encounter (733 81)   (S72 002P)   2  History of bursitis (V13 59) (Z87 39)   3  History of candidiasis of mouth (V12 09) (Z86 19)   4  History of erectile dysfunction (V13 89) (Z87 438)   5  History of Mycobacterium avium complex infection (V12 09) (Z86 19)   6  History of shingles (V12 09) (Z86 19)   7  History of stroke (V12 54) (Z86 73)   8  History of Obstructive sleep apnea, adult (327 23) (G47 33)  Active Problems And Past Medical History Reviewed: The active problems and past medical history were reviewed and updated today  Family History  Mother    1  Family history of diabetes mellitus (V18 0) (Z83 3)   2  Family history of hypertension (V17 49) (Z82 49)  Father    3  Family history of hepatic cirrhosis (V18 59) (Z83 79)   4   Family history of malignant neoplasm of prostate (V16 42) (Z80 42)  Uncle    5  Family history of tuberculosis (V18 8) (Z83 1)  Family History Reviewed: The family history was reviewed and updated today  Social History    · Cocaine use (305 60) (F14 90)   · Current every day smoker (305 1) (F17 200)  Social History Reviewed: The social history was reviewed and updated today  The social history was reviewed and is unchanged  Surgical History    1  History of Hip Replacement Left  Surgical History Reviewed: The surgical history was reviewed and updated today  Current Meds   1  Azithromycin 600 MG Oral Tablet; TAKE 2 TABLETS ONCE WEEKLY  Requested for:   14UVG1620; Last Rx:[id number] Ordered   2  Cyclobenzaprine HCl - 10 MG Oral Tablet; TAKE 1 TABLET TWICE DAILY AS NEEDED; Therapy: 13XHF6152 to (Evaluate:17Jan2018)  Requested for: 70WPL3328; Last   Rx:[id number] Ordered   3  Descovy 200-25 MG Oral Tablet; Take 1 tablet daily; Therapy: 07OOC3874 to (Evaluate:18Apr2018)  Requested for: 06BJO4233; Last   Rx:[id number] Ordered   4  Magnesium Oxide 400 MG Oral Tablet; TAKE 1 TABLET Every 8 hours; Last   Rx:[id number] Ordered   5  Pradaxa 150 MG Oral Capsule; TAKE 1 CAPSULE TWICE DAILY; Therapy: 59ONS2391 to (Evaluate:19Mar2018)  Requested for: 33XJP9784; Last   Rx:[id number] Ordered   6  Prezcobix 800-150 MG Oral Tablet; take 1 tablet by mouth daily; Therapy: 08CIF7960 to (Evaluate:18Apr2018)  Requested for: 90AOC0009; Last   Rx:[id number] Ordered   7  Sulfamethoxazole-Trimethoprim 800-160 MG Oral Tablet; TAKE 1 TABLET BY MOUTH   DAILY ON MONDAY, WEDNESDAY, AND FRIDAY  Requested for: 77ARM0520; Last   Rx:[id number] Ordered   8  Tivicay 50 MG Oral Tablet; take 1 tablet by mouth daily; Therapy: 40IWR9923 to (Evaluate:17Jun2018)  Requested for: 05VIA7870; Last   Rx:[id number] Ordered   9  Tylenol 325 MG Oral Tablet; TAKE 2 TABLET Every 6 hours PRN for headache, pain, fever;    Therapy: 41HPD3910 to (Evaluate:17Jan2018)  Requested for: 69EBZ6052; Last   Rx:[id number] Ordered  Medication List Reviewed: The medication list was reviewed and updated today  Allergies    1  Penicillins    2  Food   3  TOMATO    Vitals  Signs   Recorded: 99ARR1056 10:28AM   Temperature: 97 9 F  Heart Rate: 431  Systolic: 134  Diastolic: 80  Height: 5 ft 11 in  Weight: 156 lb 6 oz  BMI Calculated: 21 81  BSA Calculated: 1 9  O2 Saturation: 97    Physical Exam    Constitutional   General appearance: Abnormal   chronically ill, underweight, clothing appropriate, well groomed, appears tired and appears older than stated age  Eyes   Conjunctiva and lids: No swelling, erythema or discharge  Pupils and irises: Equal, round and reactive to light  Ears, Nose, Mouth, and Throat   External inspection of ears and nose: Normal     Otoscopic examination: Tympanic membranes translucent with normal light reflex  Canals patent without erythema  Nasal mucosa, septum, and turbinates: Normal without edema or erythema  Oropharynx: Normal with no erythema, edema, exudate or lesions  Pulmonary   Respiratory effort: Abnormal   Respiratory rate: normal  Assessment of respiratory effort revealed normal rhythm and effort  Respiratory Findings: dry cough  Auscultation of lungs: Clear to auscultation  Cardiovascular   Auscultation of heart: Normal rate and rhythm, normal S1 and S2, without murmurs  slight nonpitting edema in b/l thighs  R worse then left  Abdomen   Abdomen: Non-tender, no masses  Liver and spleen: No hepatomegaly or splenomegaly  Lymphatic   Palpation of lymph nodes in neck: No lymphadenopathy  Musculoskeletal   Gait and station: Normal     Digits and nails: Normal without clubbing or cyanosis  Inspection/palpation of joints, bones, and muscles: Abnormal   Appearance - no swelling and normal spinal curvature  Palpation - bilateral hip tenderness     Skin   Skin and subcutaneous tissue: Abnormal   (scattered macro papular rash on face, improving) dry skin  Clinical impression: eczema  Scalp and Hair: thin hair  Psychiatric   Orientation to person, place, and time: Normal     Mood and affect: Normal        Attending Note  Collaborating Physician Note: Collaborating Physician: I agree with the Advanced Practitioner note        Future Appointments    Date/Time Provider Specialty Site   01/30/2018 04:45 PM Yuly Fagan MD Infectious Disease [facility] AT [facility]   02/05/2018 11:30 AM MANDY Luong Epidemiology/Public Health [facility] AT [id number] [place] [phone]   Electronically signed by : Román Harrison; Jan 8 2018 12:34PM EST                       (Author)    Electronically signed by : Frantz Leon DO; Jan 9 2018  2:12PM EST                       (Author)

## 2018-01-23 NOTE — PROGRESS NOTES
Assessment    1  AIDS (042) (B20)   2  Anemia (285 9) (D64 9)   3  DVT (deep venous thrombosis) (453 40) (I82 409)   4  Hypertension (401 9) (I10)   5  Pulmonary embolism (415 19) (I26 99)   6  Protein-calorie malnutrition (263 9) (E46)   7  Nicotine dependence (305 1) (F17 200)    Plan  HIV disease    · Flulaval Quadrivalent Intramuscular Suspension    Discussion/Summary    Pat Johnson is a 47year old male here today for initial PCP visit  AIDS: CD4 3 VL 45,490  Not currently on ART  Unable to accurately state previous ART  Await previous ID records  Per patient report previous treated with Atripla and then Norvir, Prezista, and TIvicay  Pt  is a poor historian  Continue current OI prophylaxis  WIll consult with ID regarding immediate treatment plan  Scheduled for ID clinic next week, but did not complete requested labs  Due to lack of insurance coverage and transportation issues getting lab work completed is a challenge  Working closely with case management to get SBPB  Educated to contact clinic immediately if he develops a fever or experiences respiratory distress  Low tolerance for sending to ED for medical treatment  Repeat CXR when he receives medical benefits  DVT/PE: Continue apixaban  Will need SBPB in order to renew medication  Referral to heme/onc when he obtains medical insurance  Anemia: Will need additional lab studies when he obtains insurance  Refer to heme/onc when he obtains insurance  HTN: Stable  /82  Encouraged to quit smoking  Protein calorie malnutrition: BMI 20 82  Encouraged a healthy well balanced diet  Refer to dietician for additional education  Weight loss may be a factor of AIDS wasting  Will most likely see improvement after pt starts ART  Nicotine dependence: Educated on the dangers of smoking  Advised to quit  referred to [address] for enrollment in smoking cessation program      PCP f/u scheduled for one week             Possible side effects of new medications were reviewed with the patient/guardian today  The treatment plan was reviewed with the patient/guardian  The patient/guardian understands and agrees with the treatment plan   The patient was counseled regarding instructions for management, risk factor reductions, risks and benefits of treatment options  Education   general HIV education  adherence  prevention care  Chief Complaint  Pt c/o BLE and swelling pain 1mth and R hip pain ongoing for 2 years  Pt reports need hip replacement      History of Present Illness  Mr Zay Taveras Is a 50-year-old  man who presents to the clinic today for initial primary care visit  PMHx significant for AIDS, anemia, asthma, and HTN  Pt reports PSHx of L hip replacement  Suleman Herring also states he had a CVA in 2012 and 2013  And a MI in 2014  At this time medical records have not been received, so pt  report can not be verified  Suleman Herring was dx with HIV in 2013  Transmission of virus was through heterosexual contact  Denies IV drug use or MSM  Per pt  report Suleman Herring was treated at [facility] ID clinic by Dr Kenisha Ramirez  Unsure of initial CD4 count or VL  Started on Atripla and later changed to Norvir, Prezista, and Tivicay  Suleman Herring stopped receiving care in 2015 due to hectic work schedule  Suleman Herring was hospitalized 9/27/17-10/3/17 at [address] in [facility] for PCP PNU  CXR positive for linear opacities and patchy opacities b/l suspicious for areas of infiltrate  CT of the chest showed multi focal patchy airspace opacities ranging from tree in bud opacities to nodular consolidation  Treated with IV levofloxacin and vancomycin  AFB cultures negative X 3  Cryptococcal Ag and Legionella negative  TB ruled out  Did have positive PPD test, but hx of latent TB treatment in 1970 after uncle dx with TB  CD4 3 VL 45,950  Initial blood Cx contaminated with skin cole, repeat blood cx negative  MRI of brain negative for acute infarct, mass, or lesions  Received fluconazole for acute oral candida infection, that has since resolved  D/C on Bactrim and Azithromycin for OI prophylaxis  ART was not started  Hospitalized at [facility] and d/c 11/22/17 for DVT  Started on apixaban and scheduled for f/u ID appointment on 12/4/17  Did not go to f/u appointment because [facility] moved to [facility] to be close to family  [facility]  [facility] and [facility] pain X one month and R hip pain ongoing for 2 years   [facility] states he was told two years ago that he needed a right hip replacement  He also states he has SOB on exertion and fatigue   [facility] reports dry, itchy skin  He is on his last day of medication   [facility] currently does not have medical insurance   [facility] is sexually active with one female partner and always uses condoms  Reports recent cocaine use one month ago  He has stopped using drugs since moving to [place]   [facility] also smokes 2-3 cigarettes a day  He resides with his mother and has support from his brother  [facility] is on SSD and has a monthly income of $5  Pain Assessment   the patient states they have pain  The pain is located in the hip pain leg pain  (on a scale of 0 to 10, the patient rates the pain at 9 )   Abuse And Domestic Violence Screen    Yes, the patient is safe at home  The patient states no one is hurting them  Depression And Suicide Screen  No, the patient has not had thoughts of hurting themself  No, the patient has not felt depressed in the past 7 days  Prefered Language is  [place]  Primary Language is  English  The patient is being seen for an initial evaluation of an existing diagnosis of HIV infection  Symptoms:  fatigue, headache, myalgias and arthralgias, but no fever, no chills, no sore throat, no mouth ulcers, no genital ulcers and no anal ulcers    no rash  Associated symptoms:  weight loss, shortness of breath and cough, but no nausea, no vomiting, no diarrhea, no confusion and no depression       SUBSTANCE ABUSE: not using ETOH  not using drugs  SMOKING: He is a current smoker, uses ciagrettes, 2 cigarettes per day packs per day and for 38 years  SEXUALLY ACTIVE: He is sexually active, having oral sex and having vaginal sex, but not having anal sex  He always uses condoms  He has had 1 partners in the last 90 days  HOUSING: He has stable housing  There are 3 people living in the household (including children)  The household income is $$690  HEALTH MAINTENANCE: His last dental exam was 12  His last eye exam was 2 yeras ago  Her last pap smear was N/A  Her last mammogram was N/A  Review of Systems    Eyes: eyesight problems and itching of the eyes, but no eye pain, no dryness of the eyes, eyes not red and no purulent discharge from the eyes  ENT: no earache, no nosebleeds, no sore throat, no hearing loss, no nasal discharge and no hoarseness  Cardiovascular: chest pain and lower extremity edema, but the heart rate was not slow, no intermittent leg claudication, the heart rate was not fast and no palpitations  Respiratory: shortness of breath, cough and shortness of breath during exertion, but no wheezing  Gastrointestinal: no abdominal pain, no nausea, no vomiting, no constipation, no diarrhea and no blood in stools  Genitourinary: no dysuria, no urinary hesitancy, no genital lesions, no incontinence, no nocturia and no testicular pain  Musculoskeletal: arthralgias, joint swelling, limb pain, myalgias, joint stiffness and limb swelling  Integumentary: itching and dry skin, but no rashes, no skin lesions and no skin wound  Neurological: headache, numbness, tingling and dizziness, but no confusion, no limb weakness, no convulsions, no fainting and no difficulty walking  Psychiatric: sleep disturbances, but not suicidal, no anxiety, no depression and no emotional problems  Active Problems    1  Acute pain (338 19) (R52)   2  AIDS (042) (B20)   3  Anemia (285 9) (D64 9)   4   Asthma (493 90) (J45 909)   5  DVT (deep venous thrombosis) (453 40) (I82 409)   6  HIV disease (042) (B20)   7  Hypertension (401 9) (I10)   8  Hypoalbuminemia (273 8) (E88 09)   9  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)   10  Pneumonia (486) (J18 9)   11  Protein-calorie malnutrition (263 9) (E46)   12  Pulmonary embolism (415 19) (I26 99)    Past Medical History    1  History of candidiasis of mouth (V12 09) (Z86 19)   2  History of shingles (V12 09) (Z86 19)    The active problems and past medical history were reviewed and updated today  Surgical History    1  History of Hip Replacement Left    The surgical history was reviewed and updated today  Family History  Mother    1  Family history of diabetes mellitus (V18 0) (Z83 3)   2  Family history of hypertension (V17 49) (Z82 49)  Father    3  Family history of hepatic cirrhosis (V18 59) (Z83 79)   4  Family history of malignant neoplasm of prostate (V16 42) (Z80 42)  Uncle    5  Family history of tuberculosis (V18 8) (Z83 1)    Social History    · Cocaine use (305 60) (F14 90)   · Current every day smoker (305 1) (F17 200)  The social history was reviewed and updated today  The social history was reviewed and is unchanged  Current Meds   1  Azithromycin 600 MG Oral Tablet; TAKE 2 TABLETS ONCE WEEKLY; Therapy: (Recorded:54Kkb5164) to Recorded   2  Eliquis 5 MG Oral Tablet; Take 1 tablet twice daily; Therapy: (Recorded:08Dec2017) to Recorded   3  Magnesium Oxide 400 MG Oral Tablet; TAKE 1 TABLET Every 8 hours; Therapy: (Recorded:44Nhv6204) to Recorded   4  Oxycodone-Acetaminophen 5-325 MG Oral Tablet; PRN; Therapy: (Recorded:90Ums6825) to Recorded   5  Sulfamethoxazole-TMP DS TABS; 1 tablet 3 times per week; Therapy: (Recorded:20Tif7494) to Recorded    The medication list was reviewed and updated today  Allergies    1  Penicillins    2  Food   3   TOMATO    Vitals  Signs   Recorded: 11Dec2017 09:52AM   Temperature: 98 F  Heart Rate: 88  Systolic: 360  Diastolic: 82  Height: 5 ft 11 in  Weight: 149 lb 5 oz  BMI Calculated: 20 82  BSA Calculated: 1 86    Physical Exam    Constitutional   General appearance: Abnormal   chronically ill, underweight, clothing appropriate, well groomed, appears tired and appears older than stated age  Eyes   Conjunctiva and lids: No swelling, erythema or discharge  Pupils and irises: Equal, round and reactive to light  Ears, Nose, Mouth, and Throat   External inspection of ears and nose: Normal     Otoscopic examination: Tympanic membranes translucent with normal light reflex  Canals patent without erythema  Nasal mucosa, septum, and turbinates: Normal without edema or erythema  Oropharynx: Normal with no erythema, edema, exudate or lesions  Pulmonary   Respiratory effort: Abnormal   Respiratory rate: normal  Assessment of respiratory effort revealed normal rhythm and effort  Respiratory Findings: dry cough  Auscultation of lungs: Abnormal   Auscultation of the lungs revealed decreased breath sounds diffusely  rales/crackles over both bases  no wheezing  Cardiovascular   Auscultation of heart: Normal rate and rhythm, normal S1 and S2, without murmurs  Examination of extremities for edema and/or varicosities: Normal     Abdomen   Abdomen: Non-tender, no masses  Liver and spleen: No hepatomegaly or splenomegaly  Lymphatic   Palpation of lymph nodes in neck: No lymphadenopathy  Musculoskeletal   Gait and station: Normal     Digits and nails: Normal without clubbing or cyanosis  Inspection/palpation of joints, bones, and muscles: Abnormal   Appearance - no swelling and normal spinal curvature  Palpation - bilateral hip tenderness  Skin   Skin and subcutaneous tissue: Abnormal   dry skin  Clinical impression: eczema  Scalp and Hair: thin hair  Psychiatric   Orientation to person, place, and time: Normal     Mood and affect: Normal     Lips, Teeth and Gums: The lips were normal with no lesions  Examination of the teeth revealed poor dentition, dental caries and missing teeth  Examination of the gingiva showed no abnormalities  Attending Note  Collaborating Physician: I agree with the Advanced Practitioner note        Future Appointments    Date/Time Provider Specialty Site   12/19/2017 05:30 PM Neelam Amaya MD Infectious Disease ASC AT [facility]   12/18/2017 10:00 AM MANDY Vasquez Epidemiology/Public Health ASC AT [address] [place] [address]   Electronically signed by : Volodymyr Santos; Dec 11 2017  9:57PM EST                       (Author)    Electronically signed by : Ziyad Olguin DO; Dec 12 2017 10:50AM EST                       (Author)

## 2018-01-23 NOTE — PROGRESS NOTES
History of Present Illness    Assessment:  Initial encounter with PT  Nutrition Diagnosis No Nutrition Diagnosis Now   Monitor And Evaluation Goal #1 - Monitor for wt loss, Goal #1 is extended  Initial encounter with Cheng Ellison today, new to clinic for care  # (67 7kg) # (currently at 86% of his IBW)  Cheng Ellison indicates moving here to help take care of his mother and focus on improving his health status  Continue to monitor his wt status and other nutritional parameters  Will receive PCP care here as well  Cheng Ellison did not identify any immediate nutrition concerns and indicates having adequate access to food at this time  Will do complete intake assessment at upcoming PCP visit  Active Problems    1  Acute pain (338 19) (R52)   2  AIDS (042) (B20)   3  Anemia (285 9) (D64 9)   4  Asthma (493 90) (J45 909)   5  DVT (deep venous thrombosis) (453 40) (I82 409)   6  HIV disease (042) (B20)   7  Hypertension (401 9) (I10)   8  Hypoalbuminemia (273 8) (E88 09)   9  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)   10  Pneumonia (486) (J18 9)   11  Protein-calorie malnutrition (263 9) (E46)   12  Pulmonary embolism (415 19) (I26 99)    Past Medical History    1  History of candidiasis of mouth (V12 09) (Z86 19)   2  History of shingles (V12 09) (Z86 19)    Surgical History    1  History of Hip Replacement Left    Family History  Mother    1  Family history of diabetes mellitus (V18 0) (Z83 3)   2  Family history of hypertension (V17 49) (Z82 49)  Father    3  Family history of hepatic cirrhosis (V18 59) (Z83 79)   4  Family history of malignant neoplasm of prostate (V16 42) (Z80 42)  Uncle    5  Family history of tuberculosis (V18 8) (Z83 1)    Social History    · Cocaine use (305 60) (F14 90)   · Current every day smoker (305 1) (F17 200)    Current Meds   1  Azithromycin 600 MG Oral Tablet; TAKE 2 TABLETS ONCE WEEKLY; Therapy: (Recorded:81Qyz0033) to Recorded   2  Eliquis 5 MG Oral Tablet;  Take 1 tablet twice daily; Therapy: (Recorded:19Wcw6523) to Recorded   3  Magnesium Oxide 400 MG Oral Tablet; TAKE 1 TABLET Every 8 hours; Therapy: (Recorded:67Nmw3274) to Recorded   4  Oxycodone-Acetaminophen 5-325 MG Oral Tablet; PRN; Therapy: (Recorded:93Mfg9805) to Recorded   5  Sulfamethoxazole-TMP DS TABS; 1 tablet 3 times per week; Therapy: (Recorded:76Yao4909) to Recorded    Allergies    1  Penicillins    2  Food   3  TOMATO    Future Appointments    Date/Time Provider Specialty Site   12/19/2017 05:30 PM Allyssa Quintero MD Infectious Disease ASC AT [facility]   12/18/2017 10:00 AM MANDY Mayen Epidemiology/Public Health ASC AT [address] [facility]   Electronically signed by :  JARETT Cowan; Dec 11 2017 11:45AM EST                       (Author)

## 2018-01-23 NOTE — PROGRESS NOTES
History of Present Illness  [facility]: The patient is being seen regarding completing annual [facility] screener   [facility] Profile[facility]:         1  I like who I am  Yes, describes me exactly (12)   2  I am not an easy person to get along with    Somewhat describes me (21)   3  I am basically a healthy person    Yes, describes me exactly (32)   4  I give up to easily    Somewhat describes me (41)   5  I have difficulty concentrating    No, doesn't describe me at all (52)   6  I am happy with my family relationships    Yes, describes me exactly (62)   7  I am comfortable being around people    Yes, describes me exactly (72)     8  Walking up a flight of stairs    [place] A Lot (80)   9  Running the length of a football field    [place] A Lot (90)     10  Sleeping    Some (101)   11  Hurting or aching in any part of your body    [place] A Lot (110)   12  Getting tired easily    [place] A Lot (120)   13  Feeling depressed or sad    None (132)   14  Nervousness    Some (141)     15  Socialize with other people (talk or visit with friends or relatives A Lot ()   12  Take part in social, Rastafari, or recreation activities (meetings, Hinduism, movies, sports, parties)    None (160)   17  Stay in your home, nursing home, or hospital because of sickness, injury, or other health problem None (172)   [address]     8 = 0   9 = 0   10 = 1   11 = 0   12 = 0   Sum = 1   PHYSICAL HEALTH SCORE 10      1 = 2   4 = 1   5 = 2   13 = 2   14 = 1   Sum = 8   MENTAL HEALTH SCORE 80      2 = 1   6 = 2   7 = 2   15 = 2   16 = 0   Sum = 7   SOCIAL HEALTH SCORE 70     Physical Health score = 10   Mental Health score = 80 Social Health score = 70   Sum = 160   GENERAL HEALTH SCORE 53      3 = 2   PERCEIVED HEALTH SCORE 100      1 = 2   2 = 1   4 = 1   6 = 2   7 = 2   Sum = 8   SELF-ESTEEM SCORE 80            2 = 1 and 1   5 = 2 and 0   7 = 2 and 0   10 = 1 and 1   12 = 0 and 2   14 = 1 and 1   Sum = 5   ANXIETY SCORE 42      4 = 1 and 1   5 = 2 and 0   10 = 1 and 1   12 = 0 and 2   13 = 2 and 0   Sum = 4   DEPRESSION SCORE 40      4 = 1, 1, 2 and 0   7 = 2 and 0   10 = 1 and 1   12 = 0 and 2   13 = 2 and 0   14 = 1 and 1   Sum = 5   ANXIETY-DEPRESSION ([facility]-AD) SCORE 36      11 = 0 and 2   PAIN SCORE 100      17 = 2 and 0   DISABILITY SCORE 0  Physical Exam    Objective: Orientation: oriented to person, oriented to place and oriented to time  Appearance: underweight, but well developed, well groomed and no decreased eye contact  Observed mood and affect: euthymic, but appropriate  Harm to self or others: denied  Substance abuse: none reported or observed  Assessment    1  AIDS (042) (B20)   2  Pulmonary embolism (415 19) (I26 99)   3  Gastritis (535 50) (K29 70)    Plan     1  Magnesium Oxide 400 MG Oral Tablet; TAKE 1 TABLET Every 8 hours    2  Cyclobenzaprine HCl - 10 MG Oral Tablet; TAKE 1 TABLET TWICE DAILY AS   NEEDED   3  Tylenol 325 MG Oral Tablet; TAKE 2 TABLET Every 6 hours PRN for headache, pain,   fever    4  Azithromycin 600 MG Oral Tablet; TAKE 2 TABLETS ONCE WEEKLY   5  Sulfamethoxazole-Trimethoprim 800-160 MG Oral Tablet; TAKE 1 TABLET BY   MOUTH DAILY ON MONDAY, WEDNESDAY, AND FRIDAY    Eliquis 5 MG Oral Tablet; Take 1 tablet twice daily  Requested for: [id number]; Last Rx:[id number]; Status: ACTIVE Ordered  Rx By: Moises York; Dispense: 30 Days ; #:60 Tablet;  Refill: 3;   For: DVT (deep venous thrombosis), Pulmonary embolism; ELISE = N; Verified Transmission to [facility]Y #[id number]; Last Updated By: System, GoMoto; 12/19/2017 5:49:10 PM     Discussion/Summary  [employer] [facility]: Today, patient presents with no major issues or concerns  [facility] Summary [facility]:   [facility] met with PT to complete his annual [facility] screener only as he was just seen by [facility] last week  In completing the screener PT shared more about his family which revealed that he has a good support system as well as 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 for 5 grandchildren between the ages of 3 and 9  PT reports trying to keep up physically with his grandkids and in playing video games with them  [facility] encouraged PT to continue on his renewed path of taking good care of his health so he can be as healthy as possible as he endeavors to be part of the young ones lives  PT agreed  (PT mentioned the dogWilbert as a member of his family  )        Future Appointments    Date/Time Provider Specialty Site   01/30/2018 04:45 PM Stanley Avitia MD Infectious Disease ASC AT [facility]   01/08/2018 10:30 AM MANDY Iniguez Epidemiology/Public Health ASC AT [address] [facility]   Electronically signed by : Bhavik Oliveira Kevin 87; Dec 20 2017  1:49PM EST                       (Author)

## 2018-01-23 NOTE — PROGRESS NOTES
History of Present Illness  [facility]:   He is being seen for an initial consultation  The patient is being seen regarding meeting and introducing [facility] and [facility] services to PT   Physical Exam    Objective: Orientation: oriented to person, oriented to place and oriented to time  Appearance: well developed and no decreased eye contact  Observed mood and affect: euthymic, but appropriate  Harm to self or others: denied  Substance abuse: none reported or observed  Assessment    1  AIDS (042) (B20)   2  Anemia (285 9) (D64 9)   3  DVT (deep venous thrombosis) (453 40) (I82 409)   4  Hypertension (401 9) (I10)   5  Pulmonary embolism (415 19) (I26 99)   6  Protein-calorie malnutrition (263 9) (E46)   7  Nicotine dependence (305 1) (F17 200)    Plan    1  Flulaval Quadrivalent Intramuscular Suspension    Discussion/Summary  [facility]: Today, patient presents with no issues or concerns  Discussion Summary [facility]:   [facility] met with PT for his initial [facility] consultation  [facility] services were reviewed  Focus for this initial visit was introduction and rapport building  PT stated that he just moved here from [place] and that he now lives with his mother whom he helps care for during the day, brother and dog  PT also have 3 adult children (ages 22, 22 and 25)  One of his daughters live in the [place] area  PT stated that he was retired a few years ago but kept working to fend off boredom and to get extra money however this busyness contributed to him not keeping up with his DR's appt and Meds which contributed to his health getting worse  [facility] explored and pointed out to PT motivations for taking care of himself which included being around for his daughters and his mom who sound like needs him  PT agreed  [facility] asked PT if he has any MH DX  PT said he experienced some anxiety when he was sick in the hospital and had suffered a couple of strokes   [facility] informed PT that she will do a more thorough screen in the next appt and invited PT to call the clinic to ask for her if any MH concerns arise before the next appt  Pt agreed        Future Appointments    Date/Time Provider Specialty Site   12/19/2017 05:30 PM Valerie Montano MD Infectious Disease ASC AT [facility]   12/18/2017 01:30 PM MANDY Blum Epidemiology/Public Health [facility]   Electronically signed by : Bhavik Carlin Kevin 87; Dec 12 2017  3:58PM EST                       (Author)

## 2018-01-24 VITALS
HEART RATE: 84 BPM | OXYGEN SATURATION: 95 % | BODY MASS INDEX: 20.51 KG/M2 | SYSTOLIC BLOOD PRESSURE: 122 MMHG | TEMPERATURE: 97.6 F | HEIGHT: 71 IN | WEIGHT: 146.5 LBS | DIASTOLIC BLOOD PRESSURE: 80 MMHG

## 2018-01-26 RX ORDER — DABIGATRAN ETEXILATE 150 MG/1
1 CAPSULE, COATED PELLETS ORAL 2 TIMES DAILY
COMMUNITY
Start: 2017-12-19 | End: 2018-03-20 | Stop reason: SDUPTHER

## 2018-01-26 RX ORDER — AZITHROMYCIN 600 MG/1
TABLET, FILM COATED ORAL
COMMUNITY

## 2018-01-26 RX ORDER — ACETAMINOPHEN 325 MG/1
TABLET ORAL EVERY 6 HOURS
COMMUNITY
Start: 2017-12-18

## 2018-01-26 RX ORDER — CYCLOBENZAPRINE HCL 10 MG
1 TABLET ORAL 2 TIMES DAILY PRN
COMMUNITY
Start: 2017-12-18 | End: 2018-01-30 | Stop reason: SDUPTHER

## 2018-01-26 RX ORDER — MAGNESIUM OXIDE 400 MG/1
1 TABLET ORAL EVERY 8 HOURS
COMMUNITY

## 2018-01-26 RX ORDER — ALBUTEROL SULFATE 90 UG/1
AEROSOL, METERED RESPIRATORY (INHALATION)
COMMUNITY
Start: 2018-01-08

## 2018-01-26 RX ORDER — DIPHENHYDRAMINE HCL 25 MG
50 TABLET ORAL
COMMUNITY
Start: 2018-01-08 | End: 2018-02-20 | Stop reason: SDUPTHER

## 2018-01-26 RX ORDER — SULFAMETHOXAZOLE AND TRIMETHOPRIM 800; 160 MG/1; MG/1
TABLET ORAL
COMMUNITY
End: 2018-01-30 | Stop reason: SDUPTHER

## 2018-01-28 PROBLEM — F17.200 NICOTINE DEPENDENCE: Status: ACTIVE | Noted: 2017-12-11

## 2018-01-28 PROBLEM — M25.551 HIP PAIN, RIGHT: Status: ACTIVE | Noted: 2017-12-18

## 2018-01-28 PROBLEM — G47.00 INSOMNIA: Status: ACTIVE | Noted: 2018-01-08

## 2018-01-28 PROBLEM — K29.70 GASTRITIS: Status: ACTIVE | Noted: 2017-12-18

## 2018-01-28 PROBLEM — Z22.7 LATENT TUBERCULOSIS: Status: ACTIVE | Noted: 2017-12-08

## 2018-01-28 PROBLEM — E46 PROTEIN-CALORIE MALNUTRITION (HCC): Status: ACTIVE | Noted: 2017-12-08

## 2018-01-28 PROBLEM — I10 HYPERTENSION: Status: ACTIVE | Noted: 2017-12-08

## 2018-01-28 PROBLEM — I26.99 PULMONARY EMBOLISM (HCC): Status: ACTIVE | Noted: 2017-12-08

## 2018-01-28 PROBLEM — M19.90 ARTHRITIS: Status: ACTIVE | Noted: 2017-12-18

## 2018-01-28 PROBLEM — B20 AIDS (HCC): Status: ACTIVE | Noted: 2017-12-08

## 2018-01-28 PROBLEM — D64.9 ANEMIA: Status: ACTIVE | Noted: 2017-12-08

## 2018-01-28 PROBLEM — R61 NIGHT SWEATS: Status: ACTIVE | Noted: 2017-12-19

## 2018-01-28 PROBLEM — I82.409 DVT (DEEP VENOUS THROMBOSIS) (HCC): Status: ACTIVE | Noted: 2017-12-08

## 2018-01-28 PROBLEM — J45.909 ASTHMA: Status: ACTIVE | Noted: 2017-12-08

## 2018-01-28 PROBLEM — B20 HIV DISEASE (HCC): Status: ACTIVE | Noted: 2017-12-08

## 2018-01-30 ENCOUNTER — OFFICE VISIT (OUTPATIENT)
Dept: SURGERY | Facility: CLINIC | Age: 53
End: 2018-01-30
Payer: COMMERCIAL

## 2018-01-30 VITALS
HEART RATE: 116 BPM | SYSTOLIC BLOOD PRESSURE: 128 MMHG | OXYGEN SATURATION: 95 % | BODY MASS INDEX: 21.7 KG/M2 | HEIGHT: 71 IN | TEMPERATURE: 97.7 F | WEIGHT: 155 LBS | DIASTOLIC BLOOD PRESSURE: 90 MMHG

## 2018-01-30 DIAGNOSIS — R61 NIGHT SWEATS: ICD-10-CM

## 2018-01-30 DIAGNOSIS — F17.210 CIGARETTE NICOTINE DEPENDENCE WITHOUT COMPLICATION: ICD-10-CM

## 2018-01-30 DIAGNOSIS — M87.051 AVASCULAR NECROSIS OF RIGHT FEMUR (HCC): ICD-10-CM

## 2018-01-30 DIAGNOSIS — B20 HIV (HUMAN IMMUNODEFICIENCY VIRUS INFECTION) (HCC): ICD-10-CM

## 2018-01-30 DIAGNOSIS — B20 AIDS (HCC): ICD-10-CM

## 2018-01-30 DIAGNOSIS — E46 PROTEIN-CALORIE MALNUTRITION, UNSPECIFIED SEVERITY (HCC): ICD-10-CM

## 2018-01-30 DIAGNOSIS — M87.051 AVASCULAR NECROSIS OF BONE OF RIGHT HIP (HCC): Primary | ICD-10-CM

## 2018-01-30 DIAGNOSIS — B20 HIV DISEASE (HCC): ICD-10-CM

## 2018-01-30 DIAGNOSIS — K08.9 POOR DENTITION: ICD-10-CM

## 2018-01-30 DIAGNOSIS — B20 HIV (HUMAN IMMUNODEFICIENCY VIRUS INFECTION) (HCC): Primary | ICD-10-CM

## 2018-01-30 PROCEDURE — 99215 OFFICE O/P EST HI 40 MIN: CPT | Performed by: INTERNAL MEDICINE

## 2018-01-30 RX ORDER — SULFAMETHOXAZOLE AND TRIMETHOPRIM 800; 160 MG/1; MG/1
1 TABLET ORAL 3 TIMES WEEKLY
Qty: 21 TABLET | Refills: 0 | Status: SHIPPED | OUTPATIENT
Start: 2018-01-31 | End: 2018-03-02

## 2018-01-30 RX ORDER — CYCLOBENZAPRINE HCL 10 MG
10 TABLET ORAL 2 TIMES DAILY PRN
Qty: 30 TABLET | Refills: 0 | Status: SHIPPED | OUTPATIENT
Start: 2018-01-30

## 2018-01-30 NOTE — TELEPHONE ENCOUNTER
Pt requesting refills for Cyclobenzaprine and sulfamethoxazole-trimethoprim  Informed Laurie Dawson

## 2018-01-30 NOTE — PROGRESS NOTES
Progress Note - Infectious Disease   Raúl Green 47 y o  male MRN: [id number]  Unit/Bed#:  Encounter: [id number]      Impression/Plan:  1  HIV-with advanced AIDS  The patient has had an excellent urologic response to Prezcobix, Tivicay, Descovy  He is integrase and genotype resistance did not reveal any resistance mutations  Continue the current antiretrovirals, continue Bactrim prophylaxis, continue the azithromycin for MAC prophylaxis  Recheck labs in 2 months and follow up in 3 months  Stressed adherence  2   Nicotine dependence-patient continues to smoke  Continue to stressed the importance of tobacco cessation  The patient has been referred to our smoking cessation program     3   Protein calorie malnutrition-the patient has gained weight  Will continue to monitor await, and have our dietitian continue to intervene    4  Night sweats-likely related to the patient's previous HIV ongoing replication  The night sweats seem to have resolved  No additional workup for now    5  Pulmonary embolism/DVT-continue anticoagulation  Discussed in detail with the primary  6   Avascular necrosis-symptomatic treatment for now  Once the patient's viral load is undetectable, and the CD4 count has risen substantially, will refer to orthopedics for intervention  7   Poor dentition-awaiting a rising the CD4 count before referring to dental for extractions  8   Pruritus-no rash at this time  Told the patient to call back if he develops a rash, as we will need to change his Pneumocystis prophylaxis if that occurs  Patient was provided medication, adherence and prevention education    Subjective:  Routine follow-up for HIV  Patient claims 100% adherence with Prezcobix, Tivicay  Patient, Anjel Crawley denies any notable side effects  Overall the feeling well  The patient denies any fever chills or sweats, denies any nausea vomiting or diarrhea, denies any cough or shortness of breath    Patient continues to smoke   He does have some pruritus but no rash  ROS: A complete 12 point ROS is negative other than that noted in the HPI    Objective:  Vitals:  Vitals:    01/30/18 1707   BP: 128/90   Pulse: (!) 116   Temp: 97 7 °F (36 5 °C)   SpO2: 95%   Weight: 70 3 kg (155 lb)   Height: 5' 11" (1 803 m)       Physical Exam:   General Appearance:  Alert, interactive, appearing well,  nontoxic, no acute distress  Neck:   Supple without lymphadenopathy, no thyromegaly or masses   Throat: Oropharynx moist without lesions  Poor dentition   Lungs:   Clear to auscultation bilaterally; no wheezes, rhonchi or rales; respirations unlabored   Heart:  RRR; no murmur, rub or gallop   Abdomen:   Soft, non-tender, non-distended, positive bowel sounds  Extremities: No clubbing, cyanosis or edema   Skin: No new rashes or lesions  No draining wounds noted         Lab Results   Component Value Date     01/16/2018    K 4 0 01/16/2018     01/16/2018    CO2 29 01/16/2018    ANIONGAP 5 01/16/2018    BUN 13 01/16/2018    CREATININE 1 07 01/16/2018    GLUF 81 01/16/2018    CALCIUM 9 8 01/16/2018    AST 20 01/16/2018    ALT 22 01/16/2018    ALKPHOS 80 01/16/2018    PROT 9 2 (H) 01/16/2018    BILITOT 0 52 01/16/2018    EGFR 92 01/16/2018     Lab Results   Component Value Date    WBC 4 51 01/16/2018    HGB 12 5 01/16/2018    HGB 13 1 01/16/2018    HCT 35 8 (L) 01/16/2018    HCT 37 7 01/16/2018    MCV 90 01/16/2018    MCV 92 01/16/2018     01/16/2018     01/16/2018     Lab Results   Component Value Date    HEPCAB Non-reactive 12/12/2017     Lab Results   Component Value Date    HAV Non-reactive 01/16/2018    HEPAIGM Non-reactive 12/12/2017    HEPCAB Non-reactive 12/12/2017     Lab Results   Component Value Date    RPR Non-Reactive 12/12/2017         Labs, Imaging, & Other studies:   All pertinent labs and imaging studies were personally reviewed      Current Outpatient Prescriptions:     acetaminophen (TYLENOL) 325 mg tablet, Take by mouth every 6 (six) hours, Disp: , Rfl:     albuterol (VENTOLIN HFA) 90 mcg/act inhaler, Inhale, Disp: , Rfl:     cyclobenzaprine (FLEXERIL) 10 mg tablet, Take 1 tablet by mouth 2 (two) times a day as needed, Disp: , Rfl:     dabigatran etexilate (PRADAXA) 150 mg capsu, Take 1 capsule by mouth 2 (two) times a day, Disp: , Rfl:     Darunavir-Cobicistat (PREZCOBIX) 800-150 MG TABS, Take 1 tablet by mouth daily, Disp: , Rfl:     diphenhydrAMINE (BENADRYL) 25 mg tablet, Take by mouth, Disp: , Rfl:     dolutegravir (TIVICAY) 50 MG TABS, Take 1 tablet by mouth daily, Disp: , Rfl:     emtricitabine-tenofovir AF (DESCOVY) 200-25 MG tablet, Take 1 tablet by mouth daily, Disp: , Rfl:     azithromycin (ZITHROMAX) 600 mg tablet, Take by mouth, Disp: , Rfl:     magnesium oxide (MAG-OX) 400 mg tablet, Take 1 tablet by mouth every 8 (eight) hours, Disp: , Rfl:     sulfamethoxazole-trimethoprim (BACTRIM DS) 800-160 mg per tablet, Take by mouth, Disp: , Rfl:

## 2018-02-02 PROBLEM — B20 AIDS (HCC): Status: RESOLVED | Noted: 2017-12-08 | Resolved: 2018-02-02

## 2018-02-05 ENCOUNTER — TELEPHONE (OUTPATIENT)
Dept: SURGERY | Facility: CLINIC | Age: 53
End: 2018-02-05

## 2018-02-19 NOTE — ASSESSMENT & PLAN NOTE
Counseled for greater than 15 minutes on the importance of smoking cessation  Advised to quit  Educated on the increased risk of heart and lung disease associated with smoking    Referred to Yimi Bruno  [address] for enrollment in smoking cessation program

## 2018-02-19 NOTE — PROGRESS NOTES
Assessment/Plan:  Problem List Items Addressed This Visit     Anemia - Primary     Lab Results   Component Value Date    WBC 4 51 2018    HGB 12 5 2018    HGB 13 1 2018    HCT 35 8 (L) 2018    HCT 37 7 2018    MCV 90 2018    MCV 92 2018     2018     2018       Stable  Counts have normalized as viral load has decreased  Check  CBC q 3 months  Asthma     Stable  Mild intermittent asthma  Continue PRN inhaler  Contact clinic if you develop nighttime cough, wheezing, or have to use rescue inhaler more frequently  Educated on the importance of smoking cessation to help reduce asthma symptoms  HIV disease ([facility] [address] )       Cd4:    CD4 T Cell Abs   Date/Time Value Ref Range Status   2018 11:14 AM 40 (L) 359 - 1519 /uL Final      Viral load: 60  ART: Prezcobix,Tivicay, and Descovy  OI Prophylaxis: azithromycin and bactrim  Excellent response to ART  Emphasized the need adherence to medical follow up appointments  Reminded of upcoming ID clinic appointment scheduled 2018  Denies side effects  Stressed the importance of adherence  Continue follow up with ID clinic  Reviewed most recent labs, including Cd4 and viral load  Discussed the risks and benefits of treatment options, instructions for management, importance of treatment adherence, and reduction of risk factor  Educated on possible  medication side effects  Counseled on routes of HIV transmission, including the risk of  infection  Emphasized that viral suppression is the best method to prevent HIV transmission  At this time pt denies the need for HIV testing of anyone in their life  Total encounter time was 45 minutes  Greater then 20 minutes were spent on counseling and patient education  Pt voices understanding and agreement with treatment plan               Hypertension     Blood pressure:   BP Readings from Last 3 Encounters:   18 102/70   01/30/18 128/90   01/08/18 130/80     Stable  Continue lifestyle changes  Educated on the following lifestyle modifications to lower BP and decrease cardiovascular risk factors  limit alcohol intake, reduce salt in diet, maintain a healthy weight, engage in 30 minutes of cardiovascular exercise daily, and not smoke  Insomnia     Secondary to pain  Will prescribe Benadryl to promote rest           Nicotine dependence     Counseled for greater than 15 minutes on the importance of smoking cessation  Advised to quit  Educated on the increased risk of heart and lung disease associated with smoking  Referred to [address] for enrollment in smoking cessation program             Pulmonary embolism ([facility] [address] )     Stable  Continue pradaxa  Subjective:      Patient ID: Sandra Ortega is a 47 y o  male  Cheng Shutter presents to the clinic today for one month f/u  Missed previous scheduled appointment  The following portions of the patient's history were reviewed and updated as appropriate: allergies, current medications, past family history, past medical history, past social history, past surgical history and problem list     Review of Systems   Constitutional: Negative for activity change, appetite change, chills, diaphoresis, fatigue, fever and unexpected weight change  No longer experiencing night sweats  HENT: Negative for congestion, dental problem, ear pain, hearing loss, mouth sores, rhinorrhea and sore throat  Eyes: Negative for pain, redness and visual disturbance  Respiratory: Positive for shortness of breath (occasional, improved)  Negative for wheezing  Cardiovascular: Negative for chest pain and leg swelling  Gastrointestinal: Negative for abdominal pain, constipation, diarrhea, nausea and vomiting  Endocrine: Negative for polydipsia, polyphagia and polyuria  Genitourinary: Negative for difficulty urinating and dysuria     Musculoskeletal: Positive for joint swelling (right hip) and myalgias (both shoulder arthritis pain  )  Negative for back pain  Skin: Negative for pallor and rash (previous rash no longer present)  Neurological: Negative for syncope and headaches  Psychiatric/Behavioral: Negative for behavioral problems and suicidal ideas  Objective:      /70   Pulse 102   Temp 98 2 °F (36 8 °C)   Ht 5' 11" (1 803 m)   Wt 67 8 kg (149 lb 6 4 oz)   SpO2 99%   BMI 20 84 kg/m²          Physical Exam   Constitutional: He is oriented to person, place, and time  He appears well-developed and well-nourished  No distress  HENT:   Head: Normocephalic  Right Ear: External ear normal    Left Ear: External ear normal    Nose: Nose normal    Mouth/Throat: Oropharynx is clear and moist  No oropharyngeal exudate  Eyes: Conjunctivae are normal  Pupils are equal, round, and reactive to light  Right eye exhibits no discharge  Left eye exhibits no discharge  Neck: Normal range of motion  No thyromegaly present  Cardiovascular: Normal rate, regular rhythm, normal heart sounds and intact distal pulses  No murmur heard  Pulmonary/Chest: Effort normal and breath sounds normal  He has no wheezes  Abdominal: Soft  Bowel sounds are normal  He exhibits no mass  There is no tenderness  Musculoskeletal: Normal range of motion  He exhibits tenderness (R hip, b/l shoulders)  He exhibits no edema  Lymphadenopathy:     He has no cervical adenopathy  Neurological: He is alert and oriented to person, place, and time  Skin: Skin is warm and dry  No rash noted  Psychiatric: He has a normal mood and affect   His behavior is normal

## 2018-02-19 NOTE — ASSESSMENT & PLAN NOTE
Stable  Mild intermittent asthma  Continue PRN inhaler  Contact clinic if you develop nighttime cough, wheezing, or have to use rescue inhaler more frequently  Educated on the importance of smoking cessation to help reduce asthma symptoms

## 2018-02-19 NOTE — ASSESSMENT & PLAN NOTE
Lab Results   Component Value Date    WBC 4 51 01/16/2018    HGB 12 5 01/16/2018    HGB 13 1 01/16/2018    HCT 35 8 (L) 01/16/2018    HCT 37 7 01/16/2018    MCV 90 01/16/2018    MCV 92 01/16/2018     01/16/2018     01/16/2018       Stable  Counts have normalized as viral load has decreased  Check  CBC q 3 months

## 2018-02-19 NOTE — ASSESSMENT & PLAN NOTE
Cd4:    CD4 T Cell Abs   Date/Time Value Ref Range Status   2018 11:14 AM 40 (L) 359 - 1519 /uL Final      Viral load: 60  ART: Prezcobix,Tivicay, and Descovy  OI Prophylaxis: azithromycin and bactrim  Excellent response to ART  Emphasized the need adherence to medical follow up appointments  Reminded of upcoming ID clinic appointment scheduled 2018  Denies side effects  Stressed the importance of adherence  Continue follow up with ID clinic  Reviewed most recent labs, including Cd4 and viral load  Discussed the risks and benefits of treatment options, instructions for management, importance of treatment adherence, and reduction of risk factor  Educated on possible  medication side effects  Counseled on routes of HIV transmission, including the risk of  infection  Emphasized that viral suppression is the best method to prevent HIV transmission  At this time pt denies the need for HIV testing of anyone in their life  Total encounter time was 45 minutes  Greater then 20 minutes were spent on counseling and patient education  Pt voices understanding and agreement with treatment plan

## 2018-02-20 ENCOUNTER — OFFICE VISIT (OUTPATIENT)
Dept: SURGERY | Facility: CLINIC | Age: 53
End: 2018-02-20
Payer: COMMERCIAL

## 2018-02-20 VITALS
OXYGEN SATURATION: 99 % | HEIGHT: 71 IN | SYSTOLIC BLOOD PRESSURE: 102 MMHG | DIASTOLIC BLOOD PRESSURE: 70 MMHG | TEMPERATURE: 98.2 F | HEART RATE: 102 BPM | BODY MASS INDEX: 20.92 KG/M2 | WEIGHT: 149.4 LBS

## 2018-02-20 DIAGNOSIS — J45.20 MILD INTERMITTENT ASTHMA WITHOUT COMPLICATION: ICD-10-CM

## 2018-02-20 DIAGNOSIS — F17.213 CIGARETTE NICOTINE DEPENDENCE WITH WITHDRAWAL: ICD-10-CM

## 2018-02-20 DIAGNOSIS — F51.01 PRIMARY INSOMNIA: ICD-10-CM

## 2018-02-20 DIAGNOSIS — I27.82 CHRONIC SADDLE PULMONARY EMBOLISM WITH ACUTE COR PULMONALE (HCC): ICD-10-CM

## 2018-02-20 DIAGNOSIS — M25.511 CHRONIC PAIN OF BOTH SHOULDERS: ICD-10-CM

## 2018-02-20 DIAGNOSIS — M25.512 CHRONIC PAIN OF BOTH SHOULDERS: ICD-10-CM

## 2018-02-20 DIAGNOSIS — M25.551 PAIN OF RIGHT HIP JOINT: Primary | ICD-10-CM

## 2018-02-20 DIAGNOSIS — I10 ESSENTIAL HYPERTENSION: ICD-10-CM

## 2018-02-20 DIAGNOSIS — G89.29 CHRONIC PAIN OF BOTH SHOULDERS: ICD-10-CM

## 2018-02-20 DIAGNOSIS — B20 HIV DISEASE (HCC): ICD-10-CM

## 2018-02-20 DIAGNOSIS — F51.01 PRIMARY INSOMNIA: Primary | ICD-10-CM

## 2018-02-20 DIAGNOSIS — I26.02 CHRONIC SADDLE PULMONARY EMBOLISM WITH ACUTE COR PULMONALE (HCC): ICD-10-CM

## 2018-02-20 DIAGNOSIS — D58.2 OTHER HEMOGLOBINOPATHIES (HCC): Primary | ICD-10-CM

## 2018-02-20 PROCEDURE — 99214 OFFICE O/P EST MOD 30 MIN: CPT | Performed by: NURSE PRACTITIONER

## 2018-02-20 RX ORDER — DIPHENHYDRAMINE HCL 25 MG/1
CAPSULE ORAL
Qty: 60 CAPSULE | Refills: 3 | Status: SHIPPED | OUTPATIENT
Start: 2018-02-20

## 2018-02-20 RX ORDER — DIPHENHYDRAMINE HCL 25 MG
50 TABLET ORAL
Qty: 30 TABLET | Refills: 0 | Status: SHIPPED | OUTPATIENT
Start: 2018-02-20

## 2018-02-20 NOTE — ASSESSMENT & PLAN NOTE
Malnutrition Findings: Extreme weight loss due to AIDS  BMI Findings: Improving  Appetite is good  Recent 3 lb weight loss due to travel  Body mass index is 20 84 kg/m²

## 2018-02-20 NOTE — ASSESSMENT & PLAN NOTE
Will require surgery  Await Cd4 count to climb to 200 and then refer to orthopedics  Refer to PT for treatment in the meantime

## 2018-02-20 NOTE — PATIENT INSTRUCTIONS
Problem List Items Addressed This Visit     Anemia - Primary     Lab Results   Component Value Date    WBC 4 51 2018    HGB 12 5 2018    HGB 13 1 2018    HCT 35 8 (L) 2018    HCT 37 7 2018    MCV 90 2018    MCV 92 2018     2018     2018       Stable  Counts have normalized as viral load has decreased  Check  CBC q 3 months  Asthma     Stable  Mild intermittent asthma  Continue PRN inhaler  Contact clinic if you develop nighttime cough, wheezing, or have to use rescue inhaler more frequently  Educated on the importance of smoking cessation to help reduce asthma symptoms  HIV disease ([facility] [address] )       Cd4:    CD4 T Cell Abs   Date/Time Value Ref Range Status   2018 11:14 AM 40 (L) 359 - 1519 /uL Final      Viral load: 60  ART: Prezcobix,Tivicay, and Descovy  OI Prophylaxis: azithromycin and bactrim  Excellent response to ART  Emphasized the need adherence to medical follow up appointments  Reminded of upcoming ID clinic appointment scheduled 2018  Denies side effects  Stressed the importance of adherence  Continue follow up with ID clinic  Reviewed most recent labs, including Cd4 and viral load  Discussed the risks and benefits of treatment options, instructions for management, importance of treatment adherence, and reduction of risk factor  Educated on possible  medication side effects  Counseled on routes of HIV transmission, including the risk of  infection  Emphasized that viral suppression is the best method to prevent HIV transmission  At this time pt denies the need for HIV testing of anyone in their life  Total encounter time was 45 minutes  Greater then 20 minutes were spent on counseling and patient education  Pt voices understanding and agreement with treatment plan               Hypertension     Blood pressure:   BP Readings from Last 3 Encounters:   18 102/70   18 128/90   01/08/18 130/80     Stable  Continue lifestyle changes  Educated on the following lifestyle modifications to lower BP and decrease cardiovascular risk factors  limit alcohol intake, reduce salt in diet, maintain a healthy weight, engage in 30 minutes of cardiovascular exercise daily, and not smoke  Insomnia     Secondary to pain  Will prescribe Benadryl to promote rest           Nicotine dependence     Counseled for greater than 15 minutes on the importance of smoking cessation  Advised to quit  Educated on the increased risk of heart and lung disease associated with smoking  Referred to Yimi Bruno [address] [place] [address] for enrollment in smoking cessation program             Pulmonary embolism ([facility] [address] )     Stable  Continue pradaxa

## 2018-02-20 NOTE — ASSESSMENT & PLAN NOTE
Blood pressure:   BP Readings from Last 3 Encounters:   02/20/18 102/70   01/30/18 128/90   01/08/18 130/80     Stable  Continue lifestyle changes  Educated on the following lifestyle modifications to lower BP and decrease cardiovascular risk factors  limit alcohol intake, reduce salt in diet, maintain a healthy weight, engage in 30 minutes of cardiovascular exercise daily, and not smoke

## 2018-03-16 DIAGNOSIS — E83.42 HYPOMAGNESEMIA: Primary | ICD-10-CM

## 2018-03-16 RX ORDER — LANOLIN ALCOHOL/MO/W.PET/CERES
CREAM (GRAM) TOPICAL
Qty: 60 TABLET | Refills: 2 | Status: SHIPPED | OUTPATIENT
Start: 2018-03-16

## 2018-03-20 DIAGNOSIS — B20 HIV (HUMAN IMMUNODEFICIENCY VIRUS INFECTION) (HCC): Primary | ICD-10-CM

## 2018-03-20 DIAGNOSIS — O22.30 DVT (DEEP VEIN THROMBOSIS) IN PREGNANCY: ICD-10-CM

## 2018-03-20 RX ORDER — ATENOLOL 100 MG/1
TABLET ORAL
Qty: 60 CAPSULE | Refills: 3 | Status: SHIPPED | OUTPATIENT
Start: 2018-03-20

## 2018-03-20 RX ORDER — DOLUTEGRAVIR SODIUM 50 MG/1
TABLET, FILM COATED ORAL
Qty: 30 TABLET | Refills: 3 | Status: SHIPPED | OUTPATIENT
Start: 2018-03-20

## 2018-06-27 ENCOUNTER — TELEPHONE (OUTPATIENT)
Dept: SURGERY | Facility: CLINIC | Age: 53
End: 2018-06-27

## 2018-08-21 NOTE — TELEPHONE ENCOUNTER
----- Message from Beth Augustine sent at 6/27/2018 11:12 AM EDT -----  Regarding: RE: Labs  Attempted to call patient again today  His cell phone is still disconnected  Called his emergency contact and left a message  His mom, Cee Blair, called back right away  She said that she isn't sure where he's at but that she thinks he's on Vacation  If she hears from him she'll have him call us       ----- Message -----  From: Jessica Ribera  Sent: 6/27/2018   9:57 AM  To: Beth Augustine  Subject: FW: Labs                                         Please try to reach pt again today to r/s all missed appts and remind him to have his labs done   ----- Message -----  From: Beth Augustine  Sent: 5/25/2018   9:47 AM  To: Jessica Ribera  Subject: RE: Labs                                         Patient's cell number is disconnected  Called patient's mom's number  She said the patient is on vacation and she doesn't know when he'll be back  But she will have him get in touch with the office to reschedule his appointments  ----- Message -----  From: Jessica Ribera  Sent: 5/22/2018   4:37 PM  To: Beth Augustine  Subject: FW: Labs                                         Please try to contact pt again   ----- Message -----  From: Alfredo Sewell  Sent: 4/27/2018  10:03 AM  To: Jessica Ribera  Subject: RE: Labs                                         Lm for pt on the home # that we canc 5/1 adama with Dr Harrington because of no labs   ----- Message -----  From: Jessica Ribera  Sent: 4/27/2018   9:20 AM  To: Alfredo Sewell  Subject: FW: Labs                                         Please call pt and r/s 5/1/18 appt with Dr Harrington because he has not had his labs done   ----- Message -----  From: Alfredo Sewell  Sent: 4/23/2018  11:53 AM  To: Jessica Ribera  Subject: RE: Labs                                         Pt cell phone not working, called his home # and spoke to his mom and lm for pt to please call the office    ----- Message -----  From: Jesus Choi  Sent: 4/23/2018  11:43 AM  To: Bishnu Nugent  Subject: FW: Labs                                         Please try to reach pt again  He needs to have his labs done by 4/25/18 for his 5/1/18 appt with Dr Harrington  ----- Message -----  From: Bishnu Nugent  Sent: 4/16/2018  12:42 PM  To: Jesus Choi  Subject: RE: Lelia Gilbert phone  Phone not taking calls  I emailed pt to have labs done   ----- Message -----  From: Jesus Choi  Sent: 4/16/2018  11:41 AM  To: Bishnu Nugent  Subject: Labs                                             Please remind pt to have his labs done by the end of this week for his 5/1/18 appt with Dr Harrington  - - -

## 2018-10-05 ENCOUNTER — TELEPHONE (OUTPATIENT)
Dept: SURGERY | Facility: CLINIC | Age: 53
End: 2018-10-05

## 2018-11-02 ENCOUNTER — PATIENT OUTREACH (OUTPATIENT)
Dept: SURGERY | Facility: CLINIC | Age: 53
End: 2018-11-02

## 2018-11-02 NOTE — PROGRESS NOTES
LOC sent to ct informing him that his case will close w/ CM services on 12/11/18, if no case update is made by then

## 2018-11-20 ENCOUNTER — TELEPHONE (OUTPATIENT)
Dept: SURGERY | Facility: CLINIC | Age: 53
End: 2018-11-20

## 2018-12-11 ENCOUNTER — PATIENT OUTREACH (OUTPATIENT)
Dept: SURGERY | Facility: CLINIC | Age: 53
End: 2018-12-11

## 2022-09-16 ENCOUNTER — APPOINTMENT (EMERGENCY)
Dept: CT IMAGING | Facility: HOSPITAL | Age: 57
End: 2022-09-16
Payer: COMMERCIAL

## 2022-09-16 ENCOUNTER — APPOINTMENT (OUTPATIENT)
Dept: RADIOLOGY | Facility: HOSPITAL | Age: 57
End: 2022-09-16
Payer: COMMERCIAL

## 2022-09-16 ENCOUNTER — HOSPITAL ENCOUNTER (INPATIENT)
Facility: HOSPITAL | Age: 57
LOS: 1 days | Discharge: HOME HEALTH | End: 2022-09-18
Attending: EMERGENCY MEDICINE | Admitting: FAMILY MEDICINE
Payer: COMMERCIAL

## 2022-09-16 ENCOUNTER — APPOINTMENT (OUTPATIENT)
Dept: MRI IMAGING | Facility: HOSPITAL | Age: 57
End: 2022-09-16
Payer: COMMERCIAL

## 2022-09-16 DIAGNOSIS — R53.1 RIGHT SIDED WEAKNESS: ICD-10-CM

## 2022-09-16 DIAGNOSIS — G45.9 TIA (TRANSIENT ISCHEMIC ATTACK): Primary | ICD-10-CM

## 2022-09-16 DIAGNOSIS — R06.02 SOB (SHORTNESS OF BREATH): ICD-10-CM

## 2022-09-16 PROBLEM — E87.6 HYPOKALEMIA: Status: ACTIVE | Noted: 2022-09-16

## 2022-09-16 LAB
2HR DELTA HS TROPONIN: 0 NG/L
4HR DELTA HS TROPONIN: 1 NG/L
ALBUMIN SERPL BCP-MCNC: 3.8 G/DL (ref 3.5–5)
ALP SERPL-CCNC: 104 U/L (ref 46–116)
ALT SERPL W P-5'-P-CCNC: 26 U/L (ref 12–78)
ANION GAP SERPL CALCULATED.3IONS-SCNC: 6 MMOL/L (ref 4–13)
APTT PPP: 32 SECONDS (ref 23–37)
AST SERPL W P-5'-P-CCNC: 17 U/L (ref 5–45)
ATRIAL RATE: 75 BPM
ATRIAL RATE: 75 BPM
BASOPHILS # BLD AUTO: 0.02 THOUSANDS/ΜL (ref 0–0.1)
BASOPHILS NFR BLD AUTO: 0 % (ref 0–1)
BILIRUB SERPL-MCNC: 0.51 MG/DL (ref 0.2–1)
BUN SERPL-MCNC: 8 MG/DL (ref 5–25)
CALCIUM SERPL-MCNC: 9.2 MG/DL (ref 8.3–10.1)
CARDIAC TROPONIN I PNL SERPL HS: 5 NG/L
CARDIAC TROPONIN I PNL SERPL HS: 5 NG/L
CARDIAC TROPONIN I PNL SERPL HS: 6 NG/L
CHLORIDE SERPL-SCNC: 106 MMOL/L (ref 96–108)
CHOLEST SERPL-MCNC: 139 MG/DL
CO2 SERPL-SCNC: 28 MMOL/L (ref 21–32)
CREAT SERPL-MCNC: 0.75 MG/DL (ref 0.6–1.3)
EOSINOPHIL # BLD AUTO: 0.02 THOUSAND/ΜL (ref 0–0.61)
EOSINOPHIL NFR BLD AUTO: 0 % (ref 0–6)
ERYTHROCYTE [DISTWIDTH] IN BLOOD BY AUTOMATED COUNT: 14.6 % (ref 11.6–15.1)
FLUAV RNA RESP QL NAA+PROBE: NEGATIVE
FLUBV RNA RESP QL NAA+PROBE: NEGATIVE
GFR SERPL CREATININE-BSD FRML MDRD: 102 ML/MIN/1.73SQ M
GLUCOSE SERPL-MCNC: 86 MG/DL (ref 65–140)
HCT VFR BLD AUTO: 35.2 % (ref 36.5–49.3)
HDLC SERPL-MCNC: 49 MG/DL
HGB BLD-MCNC: 12.2 G/DL (ref 12–17)
IMM GRANULOCYTES # BLD AUTO: 0.03 THOUSAND/UL (ref 0–0.2)
IMM GRANULOCYTES NFR BLD AUTO: 1 % (ref 0–2)
INR PPP: 2.99 (ref 0.84–1.19)
LDLC SERPL CALC-MCNC: 84 MG/DL (ref 0–100)
LYMPHOCYTES # BLD AUTO: 1.4 THOUSANDS/ΜL (ref 0.6–4.47)
LYMPHOCYTES NFR BLD AUTO: 27 % (ref 14–44)
MCH RBC QN AUTO: 31.9 PG (ref 26.8–34.3)
MCHC RBC AUTO-ENTMCNC: 34.7 G/DL (ref 31.4–37.4)
MCV RBC AUTO: 92 FL (ref 82–98)
MONOCYTES # BLD AUTO: 0.38 THOUSAND/ΜL (ref 0.17–1.22)
MONOCYTES NFR BLD AUTO: 7 % (ref 4–12)
NEUTROPHILS # BLD AUTO: 3.42 THOUSANDS/ΜL (ref 1.85–7.62)
NEUTS SEG NFR BLD AUTO: 65 % (ref 43–75)
NRBC BLD AUTO-RTO: 0 /100 WBCS
P AXIS: 22 DEGREES
P AXIS: 22 DEGREES
PLATELET # BLD AUTO: 236 THOUSANDS/UL (ref 149–390)
PMV BLD AUTO: 9.2 FL (ref 8.9–12.7)
POTASSIUM SERPL-SCNC: 2.9 MMOL/L (ref 3.5–5.3)
PR INTERVAL: 136 MS
PR INTERVAL: 136 MS
PROT SERPL-MCNC: 7.8 G/DL (ref 6.4–8.4)
PROTHROMBIN TIME: 30.4 SECONDS (ref 11.6–14.5)
QRS AXIS: -19 DEGREES
QRS AXIS: -19 DEGREES
QRSD INTERVAL: 96 MS
QRSD INTERVAL: 96 MS
QT INTERVAL: 412 MS
QT INTERVAL: 412 MS
QTC INTERVAL: 460 MS
QTC INTERVAL: 460 MS
RBC # BLD AUTO: 3.83 MILLION/UL (ref 3.88–5.62)
RSV RNA RESP QL NAA+PROBE: NEGATIVE
SARS-COV-2 RNA RESP QL NAA+PROBE: NEGATIVE
SODIUM SERPL-SCNC: 140 MMOL/L (ref 135–147)
T WAVE AXIS: -12 DEGREES
T WAVE AXIS: -12 DEGREES
TRIGL SERPL-MCNC: 32 MG/DL
VENTRICULAR RATE: 75 BPM
VENTRICULAR RATE: 75 BPM
WBC # BLD AUTO: 5.27 THOUSAND/UL (ref 4.31–10.16)

## 2022-09-16 PROCEDURE — 99285 EMERGENCY DEPT VISIT HI MDM: CPT

## 2022-09-16 PROCEDURE — 0241U HB NFCT DS VIR RESP RNA 4 TRGT: CPT

## 2022-09-16 PROCEDURE — G1004 CDSM NDSC: HCPCS

## 2022-09-16 PROCEDURE — 84484 ASSAY OF TROPONIN QUANT: CPT | Performed by: INTERNAL MEDICINE

## 2022-09-16 PROCEDURE — 70498 CT ANGIOGRAPHY NECK: CPT

## 2022-09-16 PROCEDURE — 71275 CT ANGIOGRAPHY CHEST: CPT

## 2022-09-16 PROCEDURE — 71046 X-RAY EXAM CHEST 2 VIEWS: CPT

## 2022-09-16 PROCEDURE — 70496 CT ANGIOGRAPHY HEAD: CPT

## 2022-09-16 PROCEDURE — 85730 THROMBOPLASTIN TIME PARTIAL: CPT

## 2022-09-16 PROCEDURE — 93010 ELECTROCARDIOGRAM REPORT: CPT | Performed by: INTERNAL MEDICINE

## 2022-09-16 PROCEDURE — 99220 PR INITIAL OBSERVATION CARE/DAY 70 MINUTES: CPT | Performed by: INTERNAL MEDICINE

## 2022-09-16 PROCEDURE — 99244 OFF/OP CNSLTJ NEW/EST MOD 40: CPT | Performed by: PSYCHIATRY & NEUROLOGY

## 2022-09-16 PROCEDURE — 36415 COLL VENOUS BLD VENIPUNCTURE: CPT

## 2022-09-16 PROCEDURE — 85025 COMPLETE CBC W/AUTO DIFF WBC: CPT

## 2022-09-16 PROCEDURE — 93005 ELECTROCARDIOGRAM TRACING: CPT

## 2022-09-16 PROCEDURE — 94664 DEMO&/EVAL PT USE INHALER: CPT

## 2022-09-16 PROCEDURE — 70551 MRI BRAIN STEM W/O DYE: CPT

## 2022-09-16 PROCEDURE — 84484 ASSAY OF TROPONIN QUANT: CPT

## 2022-09-16 PROCEDURE — 83036 HEMOGLOBIN GLYCOSYLATED A1C: CPT | Performed by: INTERNAL MEDICINE

## 2022-09-16 PROCEDURE — 94760 N-INVAS EAR/PLS OXIMETRY 1: CPT

## 2022-09-16 PROCEDURE — 80053 COMPREHEN METABOLIC PANEL: CPT

## 2022-09-16 PROCEDURE — 85610 PROTHROMBIN TIME: CPT

## 2022-09-16 PROCEDURE — 80061 LIPID PANEL: CPT | Performed by: INTERNAL MEDICINE

## 2022-09-16 RX ORDER — ATORVASTATIN CALCIUM 40 MG/1
40 TABLET, FILM COATED ORAL
Status: DISCONTINUED | OUTPATIENT
Start: 2022-09-16 | End: 2022-09-18 | Stop reason: HOSPADM

## 2022-09-16 RX ORDER — ALBUTEROL SULFATE 90 UG/1
2 AEROSOL, METERED RESPIRATORY (INHALATION) EVERY 4 HOURS PRN
Status: DISCONTINUED | OUTPATIENT
Start: 2022-09-16 | End: 2022-09-18 | Stop reason: HOSPADM

## 2022-09-16 RX ORDER — AZITHROMYCIN 600 MG/1
600 TABLET, FILM COATED ORAL EVERY 24 HOURS
Status: DISCONTINUED | OUTPATIENT
Start: 2022-09-16 | End: 2022-09-16

## 2022-09-16 RX ORDER — CYCLOBENZAPRINE HCL 10 MG
10 TABLET ORAL 2 TIMES DAILY PRN
Status: DISCONTINUED | OUTPATIENT
Start: 2022-09-16 | End: 2022-09-18 | Stop reason: HOSPADM

## 2022-09-16 RX ORDER — ACETAMINOPHEN 325 MG/1
650 TABLET ORAL EVERY 6 HOURS PRN
Status: DISCONTINUED | OUTPATIENT
Start: 2022-09-16 | End: 2022-09-18 | Stop reason: HOSPADM

## 2022-09-16 RX ORDER — WARFARIN SODIUM 2.5 MG/1
5 TABLET ORAL SEE ADMIN INSTRUCTIONS
Status: DISCONTINUED | OUTPATIENT
Start: 2022-09-16 | End: 2022-09-16

## 2022-09-16 RX ORDER — WARFARIN SODIUM 5 MG/1
10 TABLET ORAL
Status: DISCONTINUED | OUTPATIENT
Start: 2022-09-17 | End: 2022-09-18 | Stop reason: HOSPADM

## 2022-09-16 RX ORDER — WARFARIN SODIUM 7.5 MG/1
7.5 TABLET ORAL 3 TIMES WEEKLY
Status: DISCONTINUED | OUTPATIENT
Start: 2022-09-16 | End: 2022-09-18 | Stop reason: HOSPADM

## 2022-09-16 RX ORDER — LEVALBUTEROL INHALATION SOLUTION 0.63 MG/3ML
0.63 SOLUTION RESPIRATORY (INHALATION) EVERY 8 HOURS PRN
Status: DISCONTINUED | OUTPATIENT
Start: 2022-09-16 | End: 2022-09-16

## 2022-09-16 RX ORDER — POTASSIUM CHLORIDE 20 MEQ/1
40 TABLET, EXTENDED RELEASE ORAL ONCE
Status: COMPLETED | OUTPATIENT
Start: 2022-09-16 | End: 2022-09-16

## 2022-09-16 RX ORDER — DIPHENHYDRAMINE HCL 25 MG
50 TABLET ORAL
Status: DISCONTINUED | OUTPATIENT
Start: 2022-09-16 | End: 2022-09-18 | Stop reason: HOSPADM

## 2022-09-16 RX ADMIN — ATORVASTATIN CALCIUM 40 MG: 40 TABLET, FILM COATED ORAL at 16:08

## 2022-09-16 RX ADMIN — FLUTICASONE FUROATE AND VILANTEROL TRIFENATATE 1 PUFF: 100; 25 POWDER RESPIRATORY (INHALATION) at 16:08

## 2022-09-16 RX ADMIN — MAGNESIUM OXIDE TAB 400 MG (241.3 MG ELEMENTAL MG) 400 MG: 400 (241.3 MG) TAB at 23:20

## 2022-09-16 RX ADMIN — IOHEXOL 80 ML: 350 INJECTION, SOLUTION INTRAVENOUS at 12:17

## 2022-09-16 RX ADMIN — DIPHENHYDRAMINE HYDROCHLORIDE 50 MG: 25 TABLET ORAL at 23:19

## 2022-09-16 RX ADMIN — POTASSIUM CHLORIDE 40 MEQ: 1500 TABLET, EXTENDED RELEASE ORAL at 12:40

## 2022-09-16 RX ADMIN — IOHEXOL 80 ML: 350 INJECTION, SOLUTION INTRAVENOUS at 12:22

## 2022-09-16 RX ADMIN — MAGNESIUM OXIDE TAB 400 MG (241.3 MG ELEMENTAL MG) 400 MG: 400 (241.3 MG) TAB at 16:08

## 2022-09-16 RX ADMIN — WARFARIN SODIUM 7.5 MG: 2.5 TABLET ORAL at 17:48

## 2022-09-16 NOTE — ED PROVIDER NOTES
History  Chief Complaint   Patient presents with    Shortness of Breath     Pt presents to ED via EMS  Per EMS "pt c/o shortness of breath that started 3 days and progressively gotten worse  New weakness on right side of body  Hx of CVAx3 and weakness on left side of body "     Patient is a 69-year-old male with a past medical histor of hypertension and HIV presenting to the emergency department for evaluation of shortness of breath and right-sided weakness for 3 days  Reports pain when taking a deep breath  Reports having weakness along the right side of his body along with a headache and presented yesterday and resolved  Reports he went to his PCP today for evaluation of the shortness of breath and right-sided weakness  Reports he was sent to the emergency department further evaluation  Reports having 3 CVAs in the past as well as DVT history  Reports having a left-sided deficit including sensation and strength since his previous 3 strokes  Reports he does take warfarin daily  Denies taking medication today  Reports his weakness and shortness of breath has worsened over the past few days  Reports having associated blurry vision in both eyes  Reports history of asthma  Denies fevers, chills, rash, headache, dizziness, abdominal pain, nausea, vomiting, diarrhea, constipation, chest pain or difficulty breathing  Does not offer any other concerns or complaints  History provided by:  Patient and EMS personnel   used: No    Shortness of Breath  Duration:  3 days  Timing:  Constant  Progression:  Worsening  Associated symptoms: no abdominal pain, no chest pain, no claudication, no cough, no diaphoresis, no ear pain, no fever, no headaches, no hemoptysis, no neck pain, no PND, no rash, no sore throat, no sputum production, no syncope, no swollen glands, no vomiting and no wheezing        Prior to Admission Medications   Prescriptions Last Dose Informant Patient Reported? Taking? DIPHENHIST 25 MG capsule   No No   Sig: TAKE ONE TO TWO CAPSULES AT NIGHT   Darunavir-Cobicistat (PREZCOBIX) 800-150 MG TABS   Yes No   Sig: Take 1 tablet by mouth daily   PRADAXA 150 MG capsu   No No   Sig: TAKE 1 CAPSULE BY MOUTH TWICE A DAY   TIVICAY 50 MG TABS   No No   Sig: TAKE 1 TABLET BY MOUTH DAILY  TAKE 2 HOURS BEFORE OR 6 HOURS AFTER THE MAGNESIUM   acetaminophen (TYLENOL) 325 mg tablet   Yes No   Sig: Take by mouth every 6 (six) hours   albuterol (VENTOLIN HFA) 90 mcg/act inhaler   Yes No   Sig: Inhale   azithromycin (ZITHROMAX) 600 mg tablet   Yes No   Sig: Take by mouth   cyclobenzaprine (FLEXERIL) 10 mg tablet   No No   Sig: Take 1 tablet (10 mg total) by mouth 2 (two) times a day as needed (hip pain)   diphenhydrAMINE (BENADRYL) 25 mg tablet   No No   Sig: Take 2 tablets (50 mg total) by mouth daily at bedtime   emtricitabine-tenofovir AF (DESCOVY) 200-25 MG tablet   Yes No   Sig: Take 1 tablet by mouth daily   magnesium Oxide (MAG-OX) 400 mg TABS   No No   Sig: TAKE 1 TABLET BY MOUTH EVERY EIGHT HOURS   magnesium oxide (MAG-OX) 400 mg tablet   Yes No   Sig: Take 1 tablet by mouth every 8 (eight) hours      Facility-Administered Medications: None       Past Medical History:   Diagnosis Date    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 ([facility] [address] )     Closed fracture of left hip, with malunion, subsequent encounter     Erectile dysfunction     Hypertension     Obstructive sleep apnea, adult     Stroke [facility])        Past Surgical History:   Procedure Laterality Date    HIP SURGERY      TOTAL HIP ARTHROPLASTY Left        Family History   Problem Relation Age of Onset    Diabetes Mother     Hypertension Mother     Cirrhosis Father         HEPATIC    Prostate cancer Father     Tuberculosis Other      I have reviewed and agree with the history as documented      E-Cigarette/Vaping     E-Cigarette/Vaping Substances     Social History     Tobacco Use    Smoking status: Current Some Day Smoker     Types: Cigarettes    Smokeless tobacco: Never Used    Tobacco comment: CURRENT EVERY DAY SMOKER: 2 CIGARETTES PER DAY (AS PER ALL SCRIPTS)   Substance Use Topics    Alcohol use: Not Currently    Drug use: No     Comment: COCAINE USE (AS PER ALL SCRIPTS)       Review of Systems   Constitutional: Negative for chills, diaphoresis and fever  HENT: Negative for ear pain and sore throat  Eyes: Negative for pain and visual disturbance  Respiratory: Positive for shortness of breath  Negative for cough, hemoptysis, sputum production and wheezing  Cardiovascular: Negative for chest pain, palpitations, claudication, syncope and PND  Gastrointestinal: Negative for abdominal pain, constipation, diarrhea, nausea and vomiting  Genitourinary: Negative for dysuria and hematuria  Musculoskeletal: Negative for arthralgias, back pain and neck pain  Skin: Negative for color change and rash  Neurological: Positive for weakness  Negative for dizziness, seizures, syncope, facial asymmetry, speech difficulty, light-headedness, numbness and headaches  All other systems reviewed and are negative  Physical Exam  Physical Exam  Vitals and nursing note reviewed  Constitutional:       Appearance: Normal appearance  He is not toxic-appearing or diaphoretic  HENT:      Head: Normocephalic and atraumatic  Right Ear: External ear normal       Left Ear: External ear normal       Nose: Nose normal       Mouth/Throat:      Mouth: Mucous membranes are moist    Eyes:      General: No scleral icterus  Right eye: No discharge  Left eye: No discharge  Extraocular Movements: Extraocular movements intact  Conjunctiva/sclera: Conjunctivae normal       Pupils: Pupils are equal, round, and reactive to light  Cardiovascular:      Rate and Rhythm: Normal rate  Pulmonary:      Effort: Pulmonary effort is normal  No respiratory distress  Breath sounds: No decreased breath sounds, wheezing, rhonchi or rales     Chest: Chest wall: No mass or tenderness  Abdominal:      Palpations: Abdomen is soft  Tenderness: There is no abdominal tenderness  Musculoskeletal:         General: No swelling, deformity or signs of injury  Normal range of motion  Cervical back: Normal range of motion and neck supple  No rigidity  Skin:     General: Skin is warm and dry  Capillary Refill: Capillary refill takes less than 2 seconds  Coloration: Skin is not jaundiced  Findings: No erythema or rash  Neurological:      General: No focal deficit present  Mental Status: He is alert and oriented to person, place, and time  Mental status is at baseline  GCS: GCS eye subscore is 4  GCS verbal subscore is 5  GCS motor subscore is 6  Cranial Nerves: Cranial nerves are intact  No cranial nerve deficit  Sensory: Sensory deficit (Baseline deficit on the left side, patient reports feeling greater sensation on the right side) present  Motor: Motor function is intact  Coordination: Coordination is intact  Gait: Gait is intact  Gait normal    Psychiatric:         Mood and Affect: Mood normal          Behavior: Behavior normal          Thought Content:  Thought content normal          Judgment: Judgment normal          Vital Signs  ED Triage Vitals   Temperature Pulse Respirations Blood Pressure SpO2   09/16/22 1046 09/16/22 1046 09/16/22 1046 09/16/22 1046 09/16/22 1046   98 6 °F (37 °C) 73 20 170/100 98 %      Temp Source Heart Rate Source Patient Position - Orthostatic VS BP Location FiO2 (%)   09/16/22 1046 09/16/22 1046 09/16/22 1046 09/16/22 1046 --   Oral Monitor Lying Right arm       Pain Score       09/16/22 1241       No Pain           Vitals:    09/16/22 1230 09/16/22 1241 09/16/22 1345 09/16/22 1400   BP: 158/90 158/90 (!) 172/81 147/90   Pulse: 82 71 73 69   Patient Position - Orthostatic VS: Lying Sitting Lying Lying         Visual Acuity  Visual Acuity    Flowsheet Row Most Recent Value L Pupil Size (mm) 3   R Pupil Size (mm) 3          ED Medications  Medications   potassium chloride (K-DUR,KLOR-CON) CR tablet 40 mEq (40 mEq Oral Given 9/16/22 1240)   iohexol (OMNIPAQUE) 350 MG/ML injection (SINGLE-DOSE) 80 mL (80 mL Intravenous Given 9/16/22 1217)   iohexol (OMNIPAQUE) 350 MG/ML injection (SINGLE-DOSE) 80 mL (80 mL Intravenous Given 9/16/22 1222)       Diagnostic Studies  Results Reviewed     Procedure Component Value Units Date/Time    HS Troponin I 2hr [[id number]]  (Normal) Collected: 09/16/22 1324    Lab Status: Final result Specimen: Blood from Arm, Right Updated: 09/16/22 1356     hs TnI 2hr 5 ng/L      Delta 2hr hsTnI 0 ng/L     Protime-INR [[id number]]  (Abnormal) Collected: 09/16/22 1116    Lab Status: Final result Specimen: Blood from Arm, Left Updated: 09/16/22 1217     Protime 30 4 seconds      INR 2 99    APTT [[id number]]  (Normal) Collected: 09/16/22 1116    Lab Status: Final result Specimen: Blood from Arm, Left Updated: 09/16/22 1217     PTT 32 seconds     HS Troponin I 4hr [[id number]]     Lab Status: No result Specimen: Blood     FLU/RSV/COVID - if FLU/RSV clinically relevant [[id number]]  (Normal) Collected: 09/16/22 1116    Lab Status: Final result Specimen: Nares from Nose Updated: 09/16/22 1202     SARS-CoV-2 Negative     INFLUENZA A PCR Negative     INFLUENZA B PCR Negative     RSV PCR Negative    Narrative:      FOR PEDIATRIC PATIENTS - copy/paste COVID Guidelines URL to browser: [email or web address]    SARS-CoV-2 assay is a Nucleic Acid Amplification assay intended for the  qualitative detection of nucleic acid from SARS-CoV-2 in nasopharyngeal  swabs  Results are for the presumptive identification of SARS-CoV-2 RNA  Positive results are indicative of infection with SARS-CoV-2, the virus  causing COVID-19, but do not rule out bacterial infection or co-infection  with other viruses   Laboratories within the [facility] hospitals and its  territories are required to report all positive results to the appropriate  public health authorities  Negative results do not preclude SARS-CoV-2  infection and should not be used as the sole basis for treatment or other  patient management decisions  Negative results must be combined with  clinical observations, patient history, and epidemiological information  This test has not been FDA cleared or approved  This test has been authorized by FDA under an Emergency Use Authorization  (EUA)  This test is only authorized for the duration of time the  declaration that circumstances exist justifying the authorization of the  emergency use of an in vitro diagnostic tests for detection of SARS-CoV-2  virus and/or diagnosis of COVID-19 infection under section 564(b)(1) of  the Act, 21 U  S C  158LAW-5(Q)(8), unless the authorization is terminated  or revoked sooner  The test has been validated but independent review by FDA  and CLIA is pending  Test performed using [employer] GeneXpert: This RT-PCR assay targets N2,  a region unique to SARS-CoV-2  A conserved region in the E-gene was chosen  for pan-Sarbecovirus detection which includes SARS-CoV-2      HS Troponin 0hr (reflex protocol) [[id number]]  (Normal) Collected: 09/16/22 1116    Lab Status: Final result Specimen: Blood from Arm, Left Updated: 09/16/22 1152     hs TnI 0hr 5 ng/L     Comprehensive metabolic panel [[id number]]  (Abnormal) Collected: 09/16/22 1116    Lab Status: Final result Specimen: Blood from Arm, Left Updated: 09/16/22 1144     Sodium 140 mmol/L      Potassium 2 9 mmol/L      Chloride 106 mmol/L      CO2 28 mmol/L      ANION GAP 6 mmol/L      BUN 8 mg/dL      Creatinine 0 75 mg/dL      Glucose 86 mg/dL      Calcium 9 2 mg/dL      AST 17 U/L      ALT 26 U/L      Alkaline Phosphatase 104 U/L      Total Protein 7 8 g/dL      Albumin 3 8 g/dL      Total Bilirubin 0 51 mg/dL      eGFR 102 ml/min/1 73sq m     Narrative:      [employer] guidelines for Chronic Kidney Disease (CKD):     Stage 1 with normal or high GFR (GFR > 90 mL/min/1 73 square meters)    Stage 2 Mild CKD (GFR = 60-89 mL/min/1 73 square meters)    Stage 3A Moderate CKD (GFR = 45-59 mL/min/1 73 square meters)    Stage 3B Moderate CKD (GFR = 30-44 mL/min/1 73 square meters)    Stage 4 Severe CKD (GFR = 15-29 mL/min/1 73 square meters)    Stage 5 End Stage CKD (GFR <15 mL/min/1 73 square meters)  Note: GFR calculation is accurate only with a steady state creatinine    CBC and differential [[id number]]  (Abnormal) Collected: 09/16/22 1116    Lab Status: Final result Specimen: Blood from Arm, Left Updated: 09/16/22 1124     WBC 5 27 Thousand/uL      RBC 3 83 Million/uL      Hemoglobin 12 2 g/dL      Hematocrit 35 2 %      MCV 92 fL      MCH 31 9 pg      MCHC 34 7 g/dL      RDW 14 6 %      MPV 9 2 fL      Platelets 580 Thousands/uL      nRBC 0 /100 WBCs      Neutrophils Relative 65 %      Immat GRANS % 1 %      Lymphocytes Relative 27 %      Monocytes Relative 7 %      Eosinophils Relative 0 %      Basophils Relative 0 %      Neutrophils Absolute 3 42 Thousands/µL      Immature Grans Absolute 0 03 Thousand/uL      Lymphocytes Absolute 1 40 Thousands/µL      Monocytes Absolute 0 38 Thousand/µL      Eosinophils Absolute 0 02 Thousand/µL      Basophils Absolute 0 02 Thousands/µL                  CTA ED chest PE Study   Final Result by Sana Dangelo MD (09/16 1322)      No pulmonary embolus  Mild background emphysematous changes and multifocal linear  scarring/atelectatic change  No acute CT findings to account for the patient's symptoms  Nonspecific but probably benign sclerosis in the T12 vertebral body statistically most likely to represent fibrous dysplasia or sclerotic hemangioma  If there is back pain, further evaluation with bone scan may prove useful        Workstation performed: [id number]         CTA head and neck with and without contrast   Final Result by Josiah Sweet MD (09/16 4459)      No acute intracranial abnormality on noncontrast head CT  No dissection or flow-limiting vascular stenosis in the neck  No intracranial cerebral arterial vascular stenosis or occlusion  Dental disease with mixed sclerosis and lucency throughout the maxilla could indicate chronic maxillary infection  Follow-up dental evaluation is recommended  This examination was marked "immediate notification" in Epic in order to begin the standard process by which the radiology reading room liaison alerts the referring practitioner  Workstation performed: [id number]         XR chest 2 views   Final Result by Lillie Whittington MD (09/16 7310)      No acute cardiopulmonary disease  Workstation performed: [id number]                    Procedures  ECG 12 Lead Documentation Only    Date/Time: 9/16/2022 10:47 AM  Performed by: Jean Pierre Duffy PA-C  Authorized by: Jean Pierre Duffy PA-C     Indications / Diagnosis:  Shortness of breath  ECG reviewed by me, the ED Provider: yes    Patient location:  ED  Previous ECG:     Previous ECG:  Compared to current    Comparison ECG info:  12/27/27    Similarity:  No change  Interpretation:     Interpretation: normal    Rate:     ECG rate:  85    ECG rate assessment: normal    Rhythm:     Rhythm: sinus rhythm    Ectopy:     Ectopy: none    QRS:     QRS axis:  Normal    QRS intervals:  Normal  Conduction:     Conduction: normal    ST segments:     ST segments:  Normal  T waves:     T waves: non-specific               ED Course  ED Course as of 09/16/22 1430   Fri Sep 16, 2022   1336 CTA head and neck with and without contrast  Patient is aware of the dental finding, reports he has followed with a dentist and needs teeth extractions   1336 Patient reports his weakness has improved   Reports he no longer has blurry vision, reports it has greatly improved since presenting to the ED     1340 CTA ED chest PE Study  Reports previously injuring his low back years ago, stating it was around the T12 vertebrae              HEART Risk Score    Flowsheet Row Most Recent Value   Heart Score Risk Calculator    History 1 Filed at: 09/16/2022 1047   ECG 1 Filed at: 09/16/2022 1047   Age 1 Filed at: 09/16/2022 1047   Risk Factors 1 Filed at: 09/16/2022 1047   Troponin 0 Filed at: 09/16/2022 1047   HEART Score 4 Filed at: 09/16/2022 1047           Stroke Assessment     Row Name 09/16/22 1049             NIH Stroke Scale    Interval Baseline      Level of Consciousness (1a ) 0      LOC Questions (1b ) 0      LOC Commands (1c ) 0      Best Gaze (2 ) 0      Visual (3 ) 0      Facial Palsy (4 ) 0      Motor Arm, Left (5a ) 0      Motor Arm, Right (5b ) 0      Motor Leg, Left (6a ) 0      Motor Leg, Right (6b ) 0      Limb Ataxia (7 ) 0      Sensory (8 ) 0  baseline left sided decreased sensation due to previous CVA      Best Language (9 ) 0      Dysarthria (10 ) 0      Extinction and Inattention (11 ) (Formerly Neglect) 0      Total 0                            SBIRT 22yo+    Flowsheet Row Most Recent Value   SBIRT (23 yo +)    In order to provide better care to our patients, we are screening all of our patients for alcohol and drug use  Would it be okay to ask you these screening questions?  No Filed at: 09/16/2022 1045          Wells' Criteria for PE    Flowsheet Row Most Recent Value   Wells' Criteria for PE    Clinical signs and symptoms of DVT 0 Filed at: 09/16/2022 1102   PE is primary diagnosis or equally likely 3 Filed at: 09/16/2022 1102   HR >100 0 Filed at: 09/16/2022 1102   Immobilization at least 3 days or Surgery in the previous 4 weeks 0 Filed at: 09/16/2022 1102   Previous, objectively diagnosed PE or DVT 1 5 Filed at: 09/16/2022 1102   Hemoptysis 0 Filed at: 09/16/2022 1102   Malignancy with treatment within 6 months or palliative 0 Filed at: 09/16/2022 1102   Wells' Criteria Total 4 5 Filed at: 09/16/2022 1102                MDM  Number of Diagnoses or Management Options  Right sided weakness: new and requires workup  SOB (shortness of breath): new and requires workup  TIA (transient ischemic attack): new and requires workup  Diagnosis management comments: This is a 78-year-old male with a past medical history of hypertension, asthma, HIV presented to emergency department for evaluation of shortness of breath and right-sided weakness for 3 days  Reports the weakness and shortness of breath have worsened over the past 3 days prompting him to see his PCP this morning  Reports he was sent to the emergency department for further evaluation  Reports having a history of 3 strokes in the past along with DVTs  Reports having baseline deficit of the left side of strength and sensation due to previous stroke history  Reports taking warfarin daily  Reports having associated blurry vision  Reports yesterday having nausea and a headache which has since resolved  Denies fevers, chills, rash, dizziness, visual changes, abdominal pain, vomiting, diarrhea, constipation, chest pain or difficulty breathing  Differential diagnosis to include but is not limited to:  CVA, TIA, STEMI, ACS, arrhythmia, pneumonia, PE, COVID/FLU/RSV    Initial ED Plan: CBC, CMP, Troponin, CXR, CTA head and neck, CT PE, PT, PTT, COVID/FLU/RSV swab, EKG    ED results: Dental disease of the maxilla; benign sclerosis of T12  0 hour troponin: 5  Delta: 0  Heart score: 4  NIH: 0  Hypokalemia- PO repletion   - continues to have SOB  Reports weakness has resolved, blurry vision has resolved  Final ED assessment: Patient is stable and well appearing  Discussed radiologic studies and laboratory results  Patient verbalized understanding and is agreeable with the plan for admission  Discussed suspected TIA based upon history, presentation and work up   Discussed the case with Dr Deepak Figueroa, observation admission, bridging orders placed  Amount and/or Complexity of Data Reviewed  Clinical lab tests: ordered and reviewed  Tests in the radiology section of CPT®: ordered and reviewed  Discuss the patient with other providers: yes  Independent visualization of images, tracings, or specimens: yes        Disposition  Final diagnoses:   TIA (transient ischemic attack)   SOB (shortness of breath)   Right sided weakness     Time reflects when diagnosis was documented in both MDM as applicable and the Disposition within this note     Time User Action Codes Description Comment    9/16/2022  2:22 PM Ed Stevenson [G45 9] TIA (transient ischemic attack)     9/16/2022  2:25 PM Ed Stevenson [R06 02] SOB (shortness of breath)     9/16/2022  2:25 PM Ed Stevenson [R53 1] Right sided weakness       ED Disposition     ED Disposition   Admit    Condition   Stable    Date/Time   Fri Sep 16, 2022  2:25 PM    Comment   Case was discussed with Dr Naeem Garrett and the patient's admission status was agreed to be Admission Status: observation status to the service of Dr Naeem Garrett   Follow-up Information    None         Patient's Medications   Discharge Prescriptions    No medications on file       No discharge procedures on file      PDMP Review     None          ED Provider  Electronically Signed by           Pascual Bingham PA-C  09/16/22 [id number]

## 2022-09-16 NOTE — ASSESSMENT & PLAN NOTE
· Patient with right-sided weakness that started about 3 days ago  · May be secondary to TIA versus stroke although less likely given INR within goal  · CTA PE study-no pulmonary embolism noted; mild background emphysematous changes and multifocal linear scarring/atelectasis changes; benign sclerosis of T12 vertebral body  · CTA head neck-no acute intracranial abnormality on non con CT head; dental disease with mixed sclerosis and lucency throughout axilla indicative of chronic maxillary infection  · Will check hemoglobin A1c and lipid panel  · Will start on atorvastatin 40 mg daily  · Continue Coumadin  · Will get MRI  · Will order PT, OT, and speech therapy  · Telemetry monitoring  · Continue monitor

## 2022-09-16 NOTE — H&P
[facility]&[place] [address] 1965, 65 y o  male MRN: [id number]  Unit/Bed#: FT 03 Encounter: [id number]  Primary Care Provider: No primary care provider on file     Date and time admitted to hospital: 9/16/2022 10:39 AM    * Right sided weakness  Assessment & Plan  · Patient with right-sided weakness that started about 3 days ago  · May be secondary to TIA versus stroke although less likely given INR within goal  · CTA PE study-no pulmonary embolism noted; mild background emphysematous changes and multifocal linear scarring/atelectasis changes; benign sclerosis of T12 vertebral body  · CTA head neck-no acute intracranial abnormality on non con CT head; dental disease with mixed sclerosis and lucency throughout axilla indicative of chronic maxillary infection  · Will check hemoglobin A1c and lipid panel  · Will start on atorvastatin 40 mg daily  · Continue Coumadin  · Will get MRI  · Will order PT, OT, and speech therapy  · Telemetry monitoring  · Continue monitor    SOB (shortness of breath)  Assessment & Plan  · Patient having shortness of breath which is worth with exertion but denies any associated chest pain, palpitations, lightheadedness or dizziness  · May be secondary to underlying emphysema versus poor medication compliance  · CTA PE study-no pulmonary embolism noted; mild background emphysematous changes and multifocal linear scarring/atelectasis changes; benign sclerosis of T12 vertebral body  · Recommend 6 minute walk test prior to discharge if patient were to need oxygen  · Breo started  · Nebulizers as needed  · If shortness of breath not improving possible pulmonology referral on discharge    Hypokalemia  Assessment & Plan  · Potassium 2 9  · Patient received oral potassium  · Continue to monitor    Hypertension  Assessment & Plan  · Patient noted to have slightly elevated blood pressure on presentation  · Will allow for permissive hypertension given concern for TIA  · Continue to monitor    HIV disease [facility])  Assessment & Plan  · Patient stating that he does not know he has HIV  · Patient states he does not take any medications for HIV at this time  · On review of chart patient did have viral load which did show active disease  · Patient must have follow-up on discharge for this    DVT (deep venous thrombosis) ([facility] [address] )  Assessment & Plan  · Continue home Coumadin  · INR goal 2-3    Asthma  Assessment & Plan  · Lungs clear to auscultation on examination  · Continue to monitor    VTE Pharmacologic Prophylaxis:   Moderate Risk (Score 3-4) - Pharmacological DVT Prophylaxis Ordered: warfarin (Coumadin)  Code Status: Level 1 - Full Code   Discussion with family: Patient declined call to   Anticipated Length of Stay: Patient will be admitted on an observation basis with an anticipated length of stay of less than 2 midnights secondary to Shortness of breath and right-sided weakness  Total Time for Visit, including Counseling / Coordination of Care: 60 minutes Greater than 50% of this total time spent on direct patient counseling and coordination of care  Chief Complaint:  Shortness of breath    History of Present Illness:  Kareem Murphy is a 65 y o  male with a PMH of hypertension, history of DVT, previous CVA, and HIV who presents with shortness of breath and right-sided weakness  Patient went to his primary care provider today secondary to right-sided weakness and was sent to the emergency department  Patient states that this right-sided weakness started about 2 days ago and was not associated with any headache  Patient does have residual left-sided lower extremity weakness from prior strokes  Patient has been compliant with his Coumadin as an outpatient  Patient is poor historian and appears to have some component of medication noncompliance    Patient also complaining of shortness of breath on exertion and states he has been using his Advair more recently although this is a daily medication  Patient states he does not uses albuterol because it increases his anxiety  Again believe there is issue with medical understanding of diagnoses  Patient denied any other complaints on exam     Review of Systems:  Review of Systems   Constitutional: Negative for chills, fatigue, fever and unexpected weight change  HENT: Negative for congestion, ear pain, sore throat and tinnitus  Eyes: Negative for visual disturbance  Respiratory: Negative for cough, chest tightness, shortness of breath and wheezing  Cardiovascular: Negative for chest pain, palpitations and leg swelling  Gastrointestinal: Negative for abdominal pain, constipation, diarrhea, nausea and vomiting  Genitourinary: Negative for dysuria and frequency  Skin: Negative for rash  Neurological: Positive for weakness and headaches  Negative for dizziness, light-headedness and numbness  All other systems reviewed and are negative  Past Medical and Surgical History:   Past Medical History:   Diagnosis Date    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  Authorizing Provider   acetaminophen (TYLENOL) 325 mg tablet Take by mouth every 6 (six) hours 12/18/17   Historical Provider, MD   albuterol (VENTOLIN HFA) 90 mcg/act inhaler Inhale 1/8/18   Historical Provider, MD   azithromycin (ZITHROMAX) 600 mg tablet Take by mouth    Historical Provider, MD   cyclobenzaprine (FLEXERIL) 10 mg tablet Take 1 tablet (10 mg total) by mouth 2 (two) times a day as needed (hip pain) 1/30/18   MANDY Ornelas   Darunavir-Cobicistat (PREZCOBIX) 800-150 MG TABS Take 1 tablet by mouth daily 12/19/17   Historical Provider, MD   DIPHENHIST 25 MG capsule TAKE ONE TO TWO CAPSULES AT NIGHT 2/20/18   MANDY Ornelas   diphenhydrAMINE (BENADRYL) 25 mg tablet Take 2 tablets (50 mg total) by mouth daily at bedtime 2/20/18   MANDY Ornelas   emtricitabine-tenofovir AF (DESCOVY) 200-25 MG tablet Take 1 tablet by mouth daily 12/19/17   Historical Provider, MD   magnesium oxide (MAG-OX) 400 mg tablet Take 1 tablet by mouth every 8 (eight) hours    Historical Provider, MD   magnesium Oxide (MAG-OX) 400 mg TABS TAKE 1 TABLET BY MOUTH EVERY EIGHT HOURS 3/16/18   MANDY Ornelas   PRADAXA 150 MG capsu TAKE 1 CAPSULE BY MOUTH TWICE A DAY 3/20/18   MANDY Ornelas   TIVICAY 50 MG TABS TAKE 1 TABLET BY MOUTH DAILY  TAKE 2 HOURS BEFORE OR 6 HOURS AFTER THE MAGNESIUM 3/20/18   MANDY Zeng     I have reviewed home medications with patient personally  Allergies:    Allergies   Allergen Reactions    Penicillins     Food Rash     Annotation - [id number]: pineapples    Tomato - Food Allergy Rash       Social History:  Marital Status: Single   Patient Pre-hospital Living Situation: Home  Patient Pre-hospital Level of Mobility: walks  Substance Use History:   Social History     Substance and Sexual Activity   Alcohol Use Not Currently     Social History     Tobacco Use   Smoking Status Current Some Day Smoker    Types: Cigarettes   Smokeless Tobacco Never Used   Tobacco Comment    CURRENT EVERY DAY SMOKER: 2 CIGARETTES PER DAY (AS PER ALL SCRIPTS)     Social History     Substance and Sexual Activity   Drug Use No    Comment: COCAINE USE (AS PER ALL SCRIPTS)       Family History:  Family History   Problem Relation Age of Onset    Diabetes Mother     Hypertension Mother     Cirrhosis Father         HEPATIC    Prostate cancer Father     Tuberculosis Other        Physical Exam:     Vitals:   Blood Pressure: 153/94 (09/16/22 1430)  Pulse: 80 (09/16/22 1430)  Temperature: 98 6 °F (37 °C) (09/16/22 1046)  Temp Source: Oral (09/16/22 1046)  Respirations: 20 (09/16/22 1430)  Height: 5' 9" (175 3 cm) (09/16/22 1046)  Weight - Scale: 78 9 kg (174 lb) (09/16/22 1046)  SpO2: 98 % (09/16/22 1430)    Physical Exam  Vitals and nursing note reviewed  Constitutional:       General: He is not in acute distress  Appearance: He is well-developed  HENT:      Head: Normocephalic and atraumatic  Eyes:      General: No scleral icterus  Conjunctiva/sclera: Conjunctivae normal    Cardiovascular:      Rate and Rhythm: Normal rate and regular rhythm  Heart sounds: Normal heart sounds  No murmur heard  No friction rub  No gallop  Pulmonary:      Effort: Pulmonary effort is normal  No respiratory distress  Breath sounds: Normal breath sounds  No wheezing or rales  Abdominal:      General: Bowel sounds are normal  There is no distension  Palpations: Abdomen is soft  Tenderness: There is no abdominal tenderness  Musculoskeletal:         General: Normal range of motion  Skin:     General: Skin is warm  Findings: No rash  Neurological:      General: No focal deficit present  Mental Status: He is alert and oriented to person, place, and time  Cranial Nerves: No cranial nerve deficit  Sensory: No sensory deficit  Motor: Weakness present        Comments: Right upper extremity 4+/5 strength  Left upper extremity 5/5 strength  Right lower extremity 5/5 strength  Left lower extremity 4+/5 strength           Additional Data:     Lab Results:  Results from last 7 days   Lab Units 09/16/22  1116   WBC Thousand/uL 5 27   HEMOGLOBIN g/dL 12 2   HEMATOCRIT % 35 2*   PLATELETS Thousands/uL 236   NEUTROS PCT % 65   LYMPHS PCT % 27   MONOS PCT % 7   EOS PCT % 0     Results from last 7 days   Lab Units 09/16/22  1116   SODIUM mmol/L 140   POTASSIUM mmol/L 2 9*   CHLORIDE mmol/L 106   CO2 mmol/L 28   BUN mg/dL 8   CREATININE mg/dL 0 75 ANION GAP mmol/L 6   CALCIUM mg/dL 9 2   ALBUMIN g/dL 3 8   TOTAL BILIRUBIN mg/dL 0 51   ALK PHOS U/L 104   ALT U/L 26   AST U/L 17   GLUCOSE RANDOM mg/dL 86     Results from last 7 days   Lab Units 09/16/22  1116   INR  2 99*                   Imaging: Reviewed radiology reports from this admission including: chest xray, chest CT scan and CT head  CTA ED chest PE Study   Final Result by Osiel Abdi MD (09/16 1322)      No pulmonary embolus  Mild background emphysematous changes and multifocal linear  scarring/atelectatic change  No acute CT findings to account for the patient's symptoms  Nonspecific but probably benign sclerosis in the T12 vertebral body statistically most likely to represent fibrous dysplasia or sclerotic hemangioma  If there is back pain, further evaluation with bone scan may prove useful  Workstation performed: [id number]         CTA head and neck with and without contrast   Final Result by Osiel Abdi MD (09/16 2483)      No acute intracranial abnormality on noncontrast head CT  No dissection or flow-limiting vascular stenosis in the neck  No intracranial cerebral arterial vascular stenosis or occlusion  Dental disease with mixed sclerosis and lucency throughout the maxilla could indicate chronic maxillary infection  Follow-up dental evaluation is recommended  This examination was marked "immediate notification" in Epic in order to begin the standard process by which the radiology reading room liaison alerts the referring practitioner  Workstation performed: [id number]         XR chest 2 views   Final Result by Luke Anne MD (09/16 0030)      No acute cardiopulmonary disease  Workstation performed: [id number]         MRI inpatient order    (Results Pending)       EKG and Other Studies Reviewed on Admission:   · EKG: NSR   HR 75     ** Please Note: This note has been constructed using a voice recognition system   **

## 2022-09-16 NOTE — ASSESSMENT & PLAN NOTE
· Patient stating that he does not know he has HIV  · Patient states he does not take any medications for HIV at this time  · On review of chart patient did have viral load which did show active disease  · Patient must have follow-up on discharge for this

## 2022-09-16 NOTE — ASSESSMENT & PLAN NOTE
· Patient having shortness of breath which is worth with exertion but denies any associated chest pain, palpitations, lightheadedness or dizziness  · May be secondary to underlying emphysema versus poor medication compliance  · CTA PE study-no pulmonary embolism noted; mild background emphysematous changes and multifocal linear scarring/atelectasis changes; benign sclerosis of T12 vertebral body  · Recommend 6 minute walk test prior to discharge if patient were to need oxygen  · Breo started  · Nebulizers as needed  · If shortness of breath not improving possible pulmonology referral on discharge

## 2022-09-16 NOTE — DISCHARGE INSTRUCTIONS
CTA head and neck with and without contrast: No acute intracranial abnormality on noncontrast head CT , No dissection or flow-limiting vascular stenosis in the neck   , No intracranial cerebral arterial vascular stenosis or occlusion  , Dental disease with mixed sclerosis and lucency throughout the maxilla could indicate chronic maxillary infection    Follow-up dental evaluation is recommended

## 2022-09-16 NOTE — ASSESSMENT & PLAN NOTE
· Patient noted to have slightly elevated blood pressure on presentation  · Will allow for permissive hypertension given concern for TIA  · Continue to monitor

## 2022-09-16 NOTE — RESPIRATORY THERAPY NOTE
RT Protocol Note  Gloria Lara 65 y o  male MRN: [id number]  Unit/Bed#: FT 03 Encounter: [id number]    Assessment    Principal Problem:    Right sided weakness  Active Problems:    Asthma    DVT (deep venous thrombosis) ([facility])    HIV disease ([facility])    Hypertension    SOB (shortness of breath)    Hypokalemia      Home Pulmonary Medications:  Albuterol MDI PRN       Past Medical History:   Diagnosis Date    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 ([facility] )     Closed fracture of left hip, with malunion, subsequent encounter     Erectile dysfunction     Hypertension     Obstructive sleep apnea, adult     Stroke ([facility] )      Social History     Socioeconomic History    Marital status: Single     Spouse name: None    Number of children: None    Years of education: None    Highest education level: None   Occupational History    None   Tobacco Use    Smoking status: Current Some Day Smoker     Types: Cigarettes    Smokeless tobacco: Never Used    Tobacco comment: CURRENT EVERY DAY SMOKER: 2 CIGARETTES PER DAY (AS PER ALL SCRIPTS)   Substance and Sexual Activity    Alcohol use: Not Currently    Drug use: No     Comment: COCAINE USE (AS PER ALL SCRIPTS)    Sexual activity: None   Other Topics Concern    None   Social History Narrative    None     Social Determinants of Health     Financial Resource Strain: Not on file   Food Insecurity: Not on file   Transportation Needs: Not on file   Physical Activity: Not on file   Stress: Not on file   Social Connections: Not on file   Intimate Partner Violence: Not on file   Housing Stability: Not on file       Subjective         Objective    Physical Exam:   Assessment Type: Assess only  General Appearance: Awake  Respiratory Pattern: Normal  Chest Assessment: Chest expansion symmetrical  Bilateral Breath Sounds: Diminished  O2 Device: pt admitted for right sided weakness patient has history of emphysema will continue with home PRN meds of albuterol inhaler    Vitals:  Blood pressure 156/98, pulse 98, temperature 98 6 °F (37 °C), temperature source Oral, resp  rate 20, height 5' 9" (1 753 m), weight 78 9 kg (174 lb), SpO2 96 %  Imaging and other studies: I have personally reviewed pertinent reports        O2 Device: pt admitted for right sided weakness patient has history of emphysema will continue with home PRN meds of albuterol inhaler     Plan    Respiratory Plan: Home Bronchodilator Patient pathway

## 2022-09-16 NOTE — CONSULTS
Consultation - Neurology   Jatinder Romero 65 y o  male MRN: [id number]  Unit/Bed#: FT 03 Encounter: [id number]      Assessment/Plan   65year old male with history of prior reported strokes with residual L sided deficits, recurrent DVT history on anticoagulation at baseline (Coumadin), HIV, HTN, MICHEL  Presented on 09/16 following few days duration of atypical right-sided weakness, accompanied by severe headache yesterday (which has since resolved) and ongoing shortness of breath  Similar episode of R sided weakness approximately one year (lasted half hour and then resolved; did not seek medical attention at that time)  Will workup for cerebrovascular event, but unclear given suboptimal effort on neurologic exam today and therapeutic INR at 2 99 today  Plan:  -initiate stroke pathway:  -CTA head/neck negative for acute intracranial pathology; from vascular standpoint without any significant stenosis/flow restrictive disease, nor any LVO  -obtain MRI brain  -CTA chest negative for PE  -obtain 2D echocardiogram  -on Coumadin prior to admission; INR today at 2 99, can continue on Coumadin  -no need for antiplatelet initiation during workup  -continue lipitor 40 mg daily  -check hemoglobin A1c and lipid panel  -no clear need for permissive hypertension as symptoms have been greater than 48 hours in duration  -neuro checks  -telemetry monitoring  -provide stroke education  -therapy evaluations (PT/OT)    Discussed plan of care with attending neurologist     Recommendations for outpatient neurological follow up have yet to be determined  History of Present Illness     Reason for Consult / Principal Problem:  Right-sided weakness    HPI: Jatinder Romero is a 65 y o  male with history as mentioned above in assessment who neurology is asked to evaluate in regards to the above    In reviewing history with patient and chart review, he has history of prior strokes with residual left-sided weakness, and also has recurrent DVT history and is maintained on Coumadin  Patient states approximately 1 month ago his INR was subtherapeutic which required dose change; since then patient has had therapeutic INR values  Beginning 3 days ago, patient noticed new/atypical right-sided weakness, involving the upper and lower extremity  He also was experiencing ongoing shortness of breath  He was ultimately evaluated by his PCP today, who noted concern for right facial droop on exam, and recommended he come to the ED for evaluation  Patient himself notes the right-sided weakness, and states yesterday he had a severe headache (which has since resolved), no other acute vision or speech changes, no recent falls  Patient lives with his daughter, independent with ADLs at home, ambulates without mechanical assistive devices  Has had multiple falls within the past year due to legs giving out on him  No other recent medication changes, illnesses/infections, etc     See initial workup in the ED today as mentioned above under "plan"  Consult to neurology  Consult performed by: Bisi Leal PA-C  Consult ordered by: Debbie Cevallos DO          Review of Systems   Constitutional: Negative  HENT: Negative  Eyes: Negative for photophobia and visual disturbance  Respiratory: Positive for shortness of breath  Negative for chest tightness  Cardiovascular: Negative for chest pain and palpitations  Gastrointestinal: Negative  Musculoskeletal: Negative  Skin: Negative  Neurological: Positive for weakness and numbness  Negative for dizziness, tremors, seizures, syncope, facial asymmetry, speech difficulty, light-headedness and headaches  All other ROS reviewed and negative       Historical Information   Past Medical History:   Diagnosis Date    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 [facility])     Closed fracture of left hip, with malunion, subsequent encounter     Erectile dysfunction     Hypertension     Obstructive sleep apnea, adult     Stroke [facility])      Past Surgical History:   Procedure Laterality Date    HIP SURGERY      TOTAL HIP ARTHROPLASTY Left      Social History   Social History     Substance and Sexual Activity   Alcohol Use Not Currently     Social History     Substance and Sexual Activity   Drug Use No    Comment: COCAINE USE (AS PER ALL SCRIPTS)     E-Cigarette/Vaping     E-Cigarette/Vaping Substances     Social History     Tobacco Use   Smoking Status Current Some Day Smoker    Types: Cigarettes   Smokeless Tobacco Never Used   Tobacco Comment    CURRENT EVERY DAY SMOKER: 2 CIGARETTES PER DAY (AS PER ALL SCRIPTS)     Family History:   Family History   Problem Relation Age of Onset    Diabetes Mother     Hypertension Mother     Cirrhosis Father         HEPATIC    Prostate cancer Father     Tuberculosis Other        Review of previous medical records was completed  Meds/Allergies   current meds:   Current Facility-Administered Medications   Medication Dose Route Frequency    acetaminophen (TYLENOL) tablet 650 mg  650 mg Oral Q6H PRN    atorvastatin (LIPITOR) tablet 40 mg  40 mg Oral Daily With Dinner    cyclobenzaprine (FLEXERIL) tablet 10 mg  10 mg Oral BID PRN    diphenhydrAMINE (BENADRYL) tablet 50 mg  50 mg Oral HS    fluticasone-vilanterol (BREO ELLIPTA) 100-25 mcg/inh inhaler 1 puff  1 puff Inhalation Daily    levalbuterol (XOPENEX) inhalation solution 0 63 mg  0 63 mg Nebulization Q8H PRN    magnesium oxide (MAG-OX) tablet 400 mg  400 mg Oral Q8H    [START ON 9/17/2022] warfarin (COUMADIN) tablet 10 mg  10 mg Oral Once per day on Sun Tue Thu Sat    warfarin (COUMADIN) tablet 7 5 mg  7 5 mg Oral Once per day on Mon Wed Fri    and PTA meds:   Prior to Admission Medications   Prescriptions Last Dose Informant Patient Reported? Taking?    DIPHENHIST 25 MG capsule   No No   Sig: TAKE ONE TO TWO CAPSULES AT NIGHT   Darunavir-Cobicistat (PREZCOBIX) 800-150 MG TABS   Yes No   Sig: Take 1 tablet by mouth daily   TIVICAY 50 MG TABS   No No   Sig: TAKE 1 TABLET BY MOUTH DAILY  TAKE 2 HOURS BEFORE OR 6 HOURS AFTER THE MAGNESIUM   acetaminophen (TYLENOL) 325 mg tablet   Yes No   Sig: Take by mouth every 6 (six) hours   albuterol (VENTOLIN HFA) 90 mcg/act inhaler   Yes No   Sig: Inhale   azithromycin (ZITHROMAX) 600 mg tablet   Yes No   Sig: Take by mouth   cyclobenzaprine (FLEXERIL) 10 mg tablet   No No   Sig: Take 1 tablet (10 mg total) by mouth 2 (two) times a day as needed (hip pain)   diphenhydrAMINE (BENADRYL) 25 mg tablet   No No   Sig: Take 2 tablets (50 mg total) by mouth daily at bedtime   emtricitabine-tenofovir AF (DESCOVY) 200-25 MG tablet   Yes No   Sig: Take 1 tablet by mouth daily   magnesium Oxide (MAG-OX) 400 mg TABS   No No   Sig: TAKE 1 TABLET BY MOUTH EVERY EIGHT HOURS   magnesium oxide (MAG-OX) 400 mg tablet   Yes No   Sig: Take 1 tablet by mouth every 8 (eight) hours      Facility-Administered Medications: None       Allergies   Allergen Reactions    Penicillins     Food Rash     Annotation - [id number]: pineapples    Tomato - Food Allergy Rash       Objective   Vitals:Blood pressure 153/94, pulse 80, temperature 98 6 °F (37 °C), temperature source Oral, resp  rate 20, height 5' 9" (1 753 m), weight 78 9 kg (174 lb), SpO2 98 %  ,Body mass index is 25 7 kg/m²  No intake or output data in the 24 hours ending 09/16/22 1528    Invasive Devices: Invasive Devices  Report    Peripheral Intravenous Line  Duration           Peripheral IV 09/16/22 Right Antecubital <1 day                Physical Exam  Constitutional:       Appearance: Normal appearance  HENT:      Head: Normocephalic and atraumatic  Eyes:      Extraocular Movements: Extraocular movements intact and EOM normal       Conjunctiva/sclera: Conjunctivae normal       Pupils: Pupils are equal, round, and reactive to light  Cardiovascular:      Rate and Rhythm: Normal rate     Pulmonary:      Effort: Pulmonary effort is normal    Abdominal: General: There is no distension  Musculoskeletal:      Cervical back: Normal range of motion and neck supple  Skin:     General: Skin is warm and dry  Neurological:      Mental Status: He is alert and oriented to person, place, and time  Psychiatric:         Speech: Speech normal        Neurologic Exam     Mental Status   Oriented to person, place, and time  Attention: normal  Concentration: normal    Speech: speech is normal   Normal comprehension  Cranial Nerves     CN II   Visual fields full to confrontation  CN III, IV, VI   Pupils are equal, round, and reactive to light  Extraocular motions are normal      CN V   Left facial sensation deficit: Diminished on L to light touch, but sharp pinprick throughout face  CN VIII   CN VIII normal    R NL fold decrease, slightly diminished right lower facial muscle activation with smile and cheek puffing, appears with suboptimal effort  Motor Exam   Just minimal right upper extremity and proximal lower extremity weakness; effort appears suboptimal with slight giveaway component (overall approximately 4+)    Largely 3+/4- throughout the proximal left upper and lower extremity; again suboptimal effort with give away  Sensory Exam     Symmetric to light touch, pinprick throughout the upper and lower extremities, no wilver neglect/extinction appreciated  Gait, Coordination, and Reflexes   Minimal bradykinesia and lack of fluency with left finger-to-nose compared to right (but not out of proportion to degree of left upper extremity weakness at baseline)  Left toe equivocal to upgoing, right toe downgoing, DTRs appear symmetric, no ankle clonus  Gait deferred for safety          Lab Results:   CBC:   Results from last 7 days   Lab Units 09/16/22  1116   WBC Thousand/uL 5 27   RBC Million/uL 3 83*   HEMOGLOBIN g/dL 12 2   HEMATOCRIT % 35 2*   MCV fL 92   PLATELETS Thousands/uL 236   , BMP/CMP:   Results from last 7 days   Lab Units 09/16/22  1116   SODIUM mmol/L 140   POTASSIUM mmol/L 2 9*   CHLORIDE mmol/L 106   CO2 mmol/L 28   BUN mg/dL 8   CREATININE mg/dL 0 75   CALCIUM mg/dL 9 2   AST U/L 17   ALT U/L 26   ALK PHOS U/L 104   EGFR ml/min/1 73sq m 102   , Vitamin B12:   , HgBA1C:   , TSH:   , Coagulation:   Results from last 7 days   Lab Units 09/16/22  1116   INR  2 99*   , Lipid Profile:     Imaging Studies: I have personally reviewed pertinent films in PACS   CTA ED chest PE Study   Final Result by Jasper Mcgee MD (09/16 1322)      No pulmonary embolus  Mild background emphysematous changes and multifocal linear  scarring/atelectatic change  No acute CT findings to account for the patient's symptoms  Nonspecific but probably benign sclerosis in the T12 vertebral body statistically most likely to represent fibrous dysplasia or sclerotic hemangioma  If there is back pain, further evaluation with bone scan may prove useful  Workstation performed: [id number]         CTA head and neck with and without contrast   Final Result by Jasper Mcgee MD (09/16 4040)      No acute intracranial abnormality on noncontrast head CT  No dissection or flow-limiting vascular stenosis in the neck  No intracranial cerebral arterial vascular stenosis or occlusion  Dental disease with mixed sclerosis and lucency throughout the maxilla could indicate chronic maxillary infection  Follow-up dental evaluation is recommended  This examination was marked "immediate notification" in Epic in order to begin the standard process by which the radiology reading room liaison alerts the referring practitioner  Workstation performed: [id number]         XR chest 2 views   Final Result by Darlene Scott MD (09/16 4435)      No acute cardiopulmonary disease                    Workstation performed: [id number]         MRI inpatient order    (Results Pending)       EKG, Pathology, and Other Studies: I have personally reviewed pertinent reports  VTE Prophylaxis: Sequential compression device (Venodyne)  and Warfarin (Coumadin)    Code Status: Level 1 - Full Code    Total time spent today 45 minutes  Discussed plan of care with patient and primary team: will workup for new CVA, although lower suspicion overall, continue Coumadin/statin, neuro checks, supportive care

## 2022-09-17 LAB
ANION GAP SERPL CALCULATED.3IONS-SCNC: 8 MMOL/L (ref 4–13)
ATRIAL RATE: 72 BPM
BUN SERPL-MCNC: 9 MG/DL (ref 5–25)
CALCIUM SERPL-MCNC: 9.1 MG/DL (ref 8.3–10.1)
CHLORIDE SERPL-SCNC: 107 MMOL/L (ref 96–108)
CO2 SERPL-SCNC: 27 MMOL/L (ref 21–32)
CREAT SERPL-MCNC: 0.7 MG/DL (ref 0.6–1.3)
ERYTHROCYTE [DISTWIDTH] IN BLOOD BY AUTOMATED COUNT: 14.6 % (ref 11.6–15.1)
EST. AVERAGE GLUCOSE BLD GHB EST-MCNC: 82 MG/DL
GFR SERPL CREATININE-BSD FRML MDRD: 105 ML/MIN/1.73SQ M
GLUCOSE P FAST SERPL-MCNC: 99 MG/DL (ref 65–99)
GLUCOSE SERPL-MCNC: 99 MG/DL (ref 65–140)
HBA1C MFR BLD: 4.5 %
HCT VFR BLD AUTO: 31.5 % (ref 36.5–49.3)
HGB BLD-MCNC: 10.9 G/DL (ref 12–17)
INR PPP: 2.92 (ref 0.84–1.19)
MCH RBC QN AUTO: 31.8 PG (ref 26.8–34.3)
MCHC RBC AUTO-ENTMCNC: 34.6 G/DL (ref 31.4–37.4)
MCV RBC AUTO: 92 FL (ref 82–98)
P AXIS: 57 DEGREES
PLATELET # BLD AUTO: 221 THOUSANDS/UL (ref 149–390)
PMV BLD AUTO: 9.4 FL (ref 8.9–12.7)
POTASSIUM SERPL-SCNC: 3.4 MMOL/L (ref 3.5–5.3)
PR INTERVAL: 138 MS
PROTHROMBIN TIME: 29.9 SECONDS (ref 11.6–14.5)
QRS AXIS: 4 DEGREES
QRSD INTERVAL: 94 MS
QT INTERVAL: 402 MS
QTC INTERVAL: 440 MS
RBC # BLD AUTO: 3.43 MILLION/UL (ref 3.88–5.62)
SODIUM SERPL-SCNC: 142 MMOL/L (ref 135–147)
T WAVE AXIS: 25 DEGREES
VENTRICULAR RATE: 72 BPM
WBC # BLD AUTO: 5.59 THOUSAND/UL (ref 4.31–10.16)

## 2022-09-17 PROCEDURE — 99232 SBSQ HOSP IP/OBS MODERATE 35: CPT | Performed by: FAMILY MEDICINE

## 2022-09-17 PROCEDURE — 94664 DEMO&/EVAL PT USE INHALER: CPT

## 2022-09-17 PROCEDURE — 93010 ELECTROCARDIOGRAM REPORT: CPT | Performed by: INTERNAL MEDICINE

## 2022-09-17 PROCEDURE — 85027 COMPLETE CBC AUTOMATED: CPT | Performed by: INTERNAL MEDICINE

## 2022-09-17 PROCEDURE — 99232 SBSQ HOSP IP/OBS MODERATE 35: CPT | Performed by: PSYCHIATRY & NEUROLOGY

## 2022-09-17 PROCEDURE — 92610 EVALUATE SWALLOWING FUNCTION: CPT

## 2022-09-17 PROCEDURE — 36415 COLL VENOUS BLD VENIPUNCTURE: CPT | Performed by: INTERNAL MEDICINE

## 2022-09-17 PROCEDURE — 85610 PROTHROMBIN TIME: CPT | Performed by: INTERNAL MEDICINE

## 2022-09-17 PROCEDURE — 97162 PT EVAL MOD COMPLEX 30 MIN: CPT

## 2022-09-17 PROCEDURE — 80048 BASIC METABOLIC PNL TOTAL CA: CPT | Performed by: INTERNAL MEDICINE

## 2022-09-17 RX ORDER — MONTELUKAST SODIUM 10 MG/1
10 TABLET ORAL
COMMUNITY

## 2022-09-17 RX ORDER — TADALAFIL 20 MG/1
20 TABLET ORAL DAILY PRN
COMMUNITY

## 2022-09-17 RX ORDER — ATORVASTATIN CALCIUM 40 MG/1
40 TABLET, FILM COATED ORAL DAILY
COMMUNITY

## 2022-09-17 RX ORDER — PANTOPRAZOLE SODIUM 40 MG/1
40 TABLET, DELAYED RELEASE ORAL DAILY
COMMUNITY

## 2022-09-17 RX ORDER — HYDROXYZINE HYDROCHLORIDE 10 MG/1
20 TABLET, FILM COATED ORAL EVERY 6 HOURS PRN
COMMUNITY

## 2022-09-17 RX ORDER — PANTOPRAZOLE SODIUM 40 MG/1
40 TABLET, DELAYED RELEASE ORAL
Status: DISCONTINUED | OUTPATIENT
Start: 2022-09-17 | End: 2022-09-18 | Stop reason: HOSPADM

## 2022-09-17 RX ORDER — TRAZODONE HYDROCHLORIDE 100 MG/1
100 TABLET ORAL
COMMUNITY

## 2022-09-17 RX ORDER — ONDANSETRON 2 MG/ML
INJECTION INTRAMUSCULAR; INTRAVENOUS
Status: COMPLETED
Start: 2022-09-17 | End: 2022-09-17

## 2022-09-17 RX ORDER — GABAPENTIN 600 MG/1
600 TABLET ORAL 3 TIMES DAILY
COMMUNITY

## 2022-09-17 RX ORDER — POTASSIUM CHLORIDE 20 MEQ/1
40 TABLET, EXTENDED RELEASE ORAL ONCE
Status: COMPLETED | OUTPATIENT
Start: 2022-09-17 | End: 2022-09-17

## 2022-09-17 RX ORDER — ASPIRIN 81 MG/1
81 TABLET ORAL DAILY
COMMUNITY

## 2022-09-17 RX ORDER — WARFARIN SODIUM 10 MG/1
10 TABLET ORAL
COMMUNITY

## 2022-09-17 RX ORDER — FERROUS SULFATE 325(65) MG
325 TABLET ORAL
COMMUNITY

## 2022-09-17 RX ORDER — WARFARIN SODIUM 7.5 MG/1
7.5 TABLET ORAL
COMMUNITY

## 2022-09-17 RX ADMIN — ATORVASTATIN CALCIUM 40 MG: 40 TABLET, FILM COATED ORAL at 17:11

## 2022-09-17 RX ADMIN — WARFARIN SODIUM 10 MG: 5 TABLET ORAL at 17:11

## 2022-09-17 RX ADMIN — DIPHENHYDRAMINE HYDROCHLORIDE 50 MG: 25 TABLET ORAL at 21:59

## 2022-09-17 RX ADMIN — FLUTICASONE FUROATE AND VILANTEROL TRIFENATATE 1 PUFF: 100; 25 POWDER RESPIRATORY (INHALATION) at 13:15

## 2022-09-17 RX ADMIN — PANTOPRAZOLE SODIUM 40 MG: 40 TABLET, DELAYED RELEASE ORAL at 15:04

## 2022-09-17 RX ADMIN — ONDANSETRON 4 MG: 2 INJECTION INTRAMUSCULAR; INTRAVENOUS at 09:58

## 2022-09-17 RX ADMIN — MAGNESIUM OXIDE TAB 400 MG (241.3 MG ELEMENTAL MG) 400 MG: 400 (241.3 MG) TAB at 22:00

## 2022-09-17 RX ADMIN — MAGNESIUM OXIDE TAB 400 MG (241.3 MG ELEMENTAL MG) 400 MG: 400 (241.3 MG) TAB at 15:04

## 2022-09-17 RX ADMIN — MAGNESIUM OXIDE TAB 400 MG (241.3 MG ELEMENTAL MG) 400 MG: 400 (241.3 MG) TAB at 06:30

## 2022-09-17 RX ADMIN — POTASSIUM CHLORIDE 40 MEQ: 1500 TABLET, EXTENDED RELEASE ORAL at 13:00

## 2022-09-17 NOTE — SPEECH THERAPY NOTE
Speech-Language Pathology Bedside Swallow Evaluation        Patient Name: Christian Aguilar    KKHVM'F Date: 9/17/2022     Problem List  Patient Active Problem List   Diagnosis    Anemia    Arthritis    Asthma    DVT (deep venous thrombosis) (HCC)    Gastritis    Hip pain, right    [facility]    Hypertension    Latent tuberculosis    Insomnia    Nicotine dependence    Night sweats    Protein-calorie malnutrition ([facility] )    Pulmonary embolism (HCC)    Avascular necrosis of right femur (HCC)    Poor dentition    Chronic pain of both shoulders    Right sided weakness    SOB (shortness of breath)    Hypokalemia       Past Medical History  Past Medical History:   Diagnosis Date    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 ([facility] )     Closed fracture of left hip, with malunion, subsequent encounter     Erectile dysfunction     Hypertension     Obstructive sleep apnea, adult     Stroke [facility])        Past Surgical History  Past Surgical History:   Procedure Laterality Date    HIP SURGERY      TOTAL HIP ARTHROPLASTY Left          Current Medical Status  Pt is a 65 y o  male who presented to [facility] with shortness of breath and right-sided weakness  Reports having 3 CVAs in the past as well as DVT history  Reports having a left-sided deficit including sensation and strength since his previous 3 strokes  Reports his weakness and shortness of breath has worsened over the past few days  He was started on the stroke pathway  MRI negative therefore stroke pathway discontinued  Per RN patient has been tolerating meds and current diet- no obvious dysarthria noted  Upon my arrival to the room the patient reports his speech/language is at baseline  He admits to prior word finding difficulties which he never received ST for  He denies any history of dysphagia but admits to new occasional coughing with po  He reports this occurs maybe a few times a day   He also admits to GERD sxs but is not taking any medication for this  Patient reclined ( in poor position for intake) eating lunch upon my arrival      Past medical history:   Please see H&P for details    Special Studies:  9/16 MRI brain: 1  No acute infarction, edema, or mass effect  2   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 injury  3   Mild mucosal thickening involving the maxillary sinuses, incompletely imaged      9/16 CTA chest: No pulmonary embolus  Mild background emphysematous changes and multifocal linear  scarring/atelectatic change  No acute CT findings to account for the patient's symptoms  Nonspecific but probably benign sclerosis in the T12 vertebral body statistically most likely to represent fibrous dysplasia or sclerotic hemangioma  If there is back pain, further evaluation with bone scan may prove useful  9/16 CTA head/neck: No acute intracranial abnormality on noncontrast head CT  No dissection or flow-limiting vascular stenosis in the neck  No intracranial cerebral arterial vascular stenosis or occlusion  Dental disease with mixed sclerosis and lucency throughout the maxilla could indicate chronic maxillary infection  Follow-up dental evaluation is recommended  9/16 CXR: No acute cardiopulmonary disease      Swallow Information   Current Risks for Dysphagia & Aspiration: reported new dysphagia and hx of CVA's     Current Symptoms/Concerns: coughing with po    Current Diet: regular diet and thin liquids      Baseline Diet: regular diet and thin liquids      Baseline Assessment   Behavior/Cognition: alert    Speech/Language Status: able to participate in conversation and able to follow commands    Patient Positioning: upright in bed ( patient was repositioned upon my arrival)    Swallow Mechanism Exam   Facial: symmetrical  Labial: WFL  Lingual: bilateral decreased strength (mild)  Velum: symmetrical  Mandible: adequate ROM  Dentition: limited dentition- patient reports he is pending dental extractions and placement of full dentures  Vocal quality:clear/adequate   Volitional Cough: strong/productive   Resp: RA    Consistencies Assessed and Performance   Consistencies Administered: thin liquids, soft solids and hard solids  Specific materials administered included: cheeseburger, chips, chocolate cake, thin liquids    Oral Stage:   Mastication was mildly prolonged but adequate with the materials administered today  Bolus formation and transfer were functional with no significant oral residue noted  No overt s/s reduced oral control  Pharyngeal Stage:  Swallowing initiation appeared prompt  Laryngeal rise was palpated and judged to be within functional limits  Cough noted upon my arrival however patient in almost fully reclined position  This was not observed after he was repositioned upright  No additional coughing, throat clearing, change in vocal quality or respiratory status noted today  Esophageal Concerns: globus sensation- patient admits to new increased globus sensation and reflux    Summary   Pts oropharyngeal swallow function appears generally WFL at this time with the materials administered today  s/s suggestive of esophageal dysphagia  Education was also provided on improved strategies for safety ( see below) to reduce aspiration risk  Reciprocal comprehension was verbally expressed  Consider addition of PPI  Recommendations: regular diet and thin liquids     Recommended Form of Meds: as tolerated     Aspiration precautions and compensatory swallowing strategies: upright posture, slow rate of feeding and small bites/sips    Results Reviewed with: patient, RN and MD     Dysphagia Goals: pt will tolerate regular textures with thin liquids without s/s of aspiration x3 and pt will demonstrate accurate use of upright positioning with slow rate/small bites strategy with 80% accuracy given no cues      Plan  Will f/u    GRIFFIN Murphy S , [address] [facility]  Speech Language Pathologist   Available via [address] [place] [address] #[id number]  PA #[id number]

## 2022-09-17 NOTE — ED NOTES
Patient transported to [facility] 1963, [address] [facility]  09/16/22 2501

## 2022-09-17 NOTE — UTILIZATION REVIEW
Observation Admission Authorization Request   NOTIFICATION OF OBSERVATION ADMISSION/OBSERVATION AUTHORIZATION REQUEST   SERVICING FACILITY:   [address] [place] [address]  Tax ID: [id number]  NPI: [id number]  Place of Service: On [address] [facility] Code: 22  CPT Code for Observation: CPT   CPT 55575     ATTENDING PROVIDER:  Attending Name and NPI#: Arlin Landers [[id number]]  Address: [address] [place] [address]  Phone: [phone]     UTILIZATION REVIEW CONTACT:  Viry Dobbs Utilization   Network Utilization Review Department  Phone: [phone]  Fax [phone]  Email: [email or web address]     PHYSICIAN ADVISORY SERVICES:  FOR [facility] REVIEW - MEDICAL NECESSITY DENIAL  Phone: [phone]  Fax: [phone]  Email: [email or web address]     TYPE OF REQUEST:  Observation Status     ADMISSION INFORMATION:  ADMISSION DATE/TIME: 09/16/2022 14:26PM  PATIENT DIAGNOSIS CODE/DESCRIPTION:  SOB (shortness of breath) [R06 02]  DISCHARGE DATE/TIME: No discharge date for patient encounter  IMPORTANT INFORMATION:  Please contact Viry Dobbs directly with any questions or concerns regarding this request  Department voicemails are confidential     Send requests for admission clinical reviews, concurrent reviews, approvals, and administrative denials due to lack of clinical to fax [phone]

## 2022-09-17 NOTE — ASSESSMENT & PLAN NOTE
65year old male with history of prior reported strokes with residual L sided deficits, recurrent DVT history on anticoagulation at baseline (Coumadin), HIV, HTN, MICHEL       Presented on 09/16 following few days duration of atypical right-sided weakness, accompanied by severe headache on 09/15 (which has since resolved) and ongoing shortness of breath      Similar episode of R sided weakness approximately one year (lasted half hour and then resolved; did not seek medical attention at that time)       Sub-optimal/inconsistent effort on initial neurologic exam yesterday; additionally therapeutic INR at 2 99 on admission   MRI brain overnight negative for acute infarct nor other intracranial pathology      Plan:  -stroke pathway ongoing, but can discontinue as MRI negative for new CVA:  -CTA head/neck negative for acute intracranial pathology; from vascular standpoint without any significant stenosis/flow restrictive disease, nor any LVO  -MRI brain overnight negative for acute infarct  -CTA chest negative for PE  -2D echocardiogram pending  -on Coumadin prior to admission; INR today at 2 99, can continue on Coumadin  -no need for antiplatelet initiation during workup  -continue lipitor 40 mg daily  -check hemoglobin A1c and lipid panel  -no clear need for permissive hypertension as symptoms have been greater than 48 hours in duration  -neuro checks  -telemetry monitoring  -provide stroke education  -therapy evaluations (PT/OT)

## 2022-09-17 NOTE — UTILIZATION REVIEW
Initial Clinical Review    Admission: Date/Time/Statement:   Admission Orders (From admission, onward)     Ordered        09/16/22 1426  Place in Observation  Once                      09/17/22 1036  Inpatient Admission  Once        Transfer Service: Hospitalist       Question Answer Comment   Level of Care Med Surg    Estimated length of stay More than 2 Midnights    Certification I certify that inpatient services are medically necessary for this patient for a duration of greater than two midnights  See H&P and MD Progress Notes for additional information about the patient's course of treatment  09/17/22 1036   OBSERVATION  9/16 @  1426 CHANGED TO IP ADMISSION 9/17 @  1036   The patient was admitted under observation but will now need greater than 2 midnights secondary to still having some residual right-sided weakness and needing an echocardiogram for complete neurological workup         ED Arrival Information     Expected   -    Arrival   9/16/2022 10:38    Acuity   Emergent            Means of arrival   Ambulance    Escorted by   Love Samson   Hospitalist    Admission type   Emergency            Arrival complaint   SOB           Chief Complaint   Patient presents with    Shortness of Breath     Pt presents to ED via EMS  Per EMS "pt c/o shortness of breath that started 3 days and progressively gotten worse  New weakness on right side of body  Hx of CVAx3 and weakness on left side of body "       Initial Presentation: 65 y o  male presents to ED via  EMS  From  PCP office with right sided weakness for past 2 days and shortness of breath  Does have  Residual left sided LE  Weakness from prior  Strokes  Compliant with coumadin  Does appear to have some component of med non compliance  Patient also complaining of shortness of breath on exertion and states he has been using his Advair more recently although this is a daily medication    Patient states he does not uses albuterol because it increases his anxiety  Again believe there is issue with medical understanding of diagnoses  Additional PMH is  HIV, DVT and HTN  Labs  Reveal potassium  2 9  CTA  Shows  No PE   CTA  Head/neck with no acute intracranial abnormality  Admit  Observation with  Right sided weakness, Hypokalemia, Shortness of breath and plan is  Tele, monitor labs, continue coumadin, nebulizers, PT/OT/speech eval  And MRI            ED Triage Vitals   Temperature Pulse Respirations Blood Pressure SpO2   09/16/22 1046 09/16/22 1046 09/16/22 1046 09/16/22 1046 09/16/22 1046   98 6 °F (37 °C) 73 20 170/100 98 %      Temp Source Heart Rate Source Patient Position - Orthostatic VS BP Location FiO2 (%)   09/16/22 1046 09/16/22 1046 09/16/22 1046 09/16/22 1046 --   Oral Monitor Lying Right arm       Pain Score       09/16/22 1241       No Pain          Wt Readings from Last 1 Encounters:   09/16/22 78 9 kg (174 lb)     Additional Vital Signs:   09/17/22 0600 -- 85 20 129/81 102 97 % None (Room air) Lying   09/17/22 0400 -- 70 20 143/75 -- 96 % None (Room air) --   09/17/22 0200 -- 72 18 141/73 -- 95 % None (Room air) Lying   09/17/22 0008 -- 86 20 136/78 -- 94 % None (Room air) Lying   09/17/22 0000 -- 86 20 136/78 100 94 % None (Room air) Lying   09/16/22 2315 -- 82 15 140/92 -- 96 % None (Room air) Lying   09/16/22 2200 98 5 °F (36 9 °C) 84 17 134/82 -- 95 % None (Room air) Sitting   09/16/22 2000 -- 103 20 123/87 102 97 % None (Room air) Sitting   09/16/22 1930 -- 105 20 117/66 85 94 % None (Room air) --   09/16/22 1830 -- 79 20 133/72 -- 99 % None (Room air) --   09/16/22 1730 -- 80 22 145/80 106 98 % None (Room air) Sitting   09/16/22 1630 -- -- 18 -- -- 97 % None (Room air) --   09/16/22 1607 -- 98 20 -- -- 96 % None (Room air) --   09/16/22 1530 -- 100 19 156/98 122 96 % -- Lying   09/16/22 1430 -- 80 20 153/94 118 98 % None (Room air) Lying   09/16/22 1400 -- 69 18 147/90 113 98 % None (Room air) Lying   09/16/22 1345 -- 73 21 172/81 Abnormal  112 98 % None (Room air) Lying   09/16/22 1241 -- 71 18 158/90 116 98 % None (Room air) Sitting   09/16/22 1230 -- 82 17 158/90 116 98 % None (Room air) Lying   09/16/22 1200 -- 84 18 139/91 111 98 % None (Room air) Lying   09/16/22 1130 -- 70 20 156/91 117 97 % None (Room air) Lying   09/16/22 1046 98 6 °F (37 °C) 73 20 170/100 -- 98 % None (Room air) Lying       Pertinent Labs/Diagnostic Test Results:   EKG      NSR      HR  85      Non specific  T waves     Normal QRS  MRI brain wo contrast   Final Result by Murphy Marie MD (09/17 0710)      1  No acute infarction, edema, or mass effect  2   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 injury  3   Mild mucosal thickening involving the maxillary sinuses, incompletely imaged  Workstation performed: [id number]         CTA ED chest PE Study   Final Result by Ange Mccabe MD (09/16 1322)      No pulmonary embolus  Mild background emphysematous changes and multifocal linear  scarring/atelectatic change  No acute CT findings to account for the patient's symptoms  Nonspecific but probably benign sclerosis in the T12 vertebral body statistically most likely to represent fibrous dysplasia or sclerotic hemangioma  If there is back pain, further evaluation with bone scan may prove useful  Workstation performed: [id number]         CTA head and neck with and without contrast   Final Result by Ange Mccabe MD (09/16 1256)      No acute intracranial abnormality on noncontrast head CT  No dissection or flow-limiting vascular stenosis in the neck  No intracranial cerebral arterial vascular stenosis or occlusion  Dental disease with mixed sclerosis and lucency throughout the maxilla could indicate chronic maxillary infection  Follow-up dental evaluation is recommended        This examination was marked "immediate notification" in Epic in order to begin the standard process by which the radiology reading room liaison alerts the referring practitioner  Workstation performed: [id number]         XR chest 2 views   Final Result by Jyotsna Wade MD (09/16 1138)      No acute cardiopulmonary disease                    Workstation performed: [id number]           Results from last 7 days   Lab Units 09/16/22  1116   SARS-COV-2  Negative     Results from last 7 days   Lab Units 09/17/22  0630 09/16/22  1116   WBC Thousand/uL 5 59 5 27   HEMOGLOBIN g/dL 10 9* 12 2   HEMATOCRIT % 31 5* 35 2*   PLATELETS Thousands/uL 221 236   NEUTROS ABS Thousands/µL  --  3 42         Results from last 7 days   Lab Units 09/17/22  0630 09/16/22  1116   SODIUM mmol/L 142 140   POTASSIUM mmol/L 3 4* 2 9*   CHLORIDE mmol/L 107 106   CO2 mmol/L 27 28   ANION GAP mmol/L 8 6   BUN mg/dL 9 8   CREATININE mg/dL 0 70 0 75   EGFR ml/min/1 73sq m 105 102   CALCIUM mg/dL 9 1 9 2     Results from last 7 days   Lab Units 09/16/22  1116   AST U/L 17   ALT U/L 26   ALK PHOS U/L 104   TOTAL PROTEIN g/dL 7 8   ALBUMIN g/dL 3 8   TOTAL BILIRUBIN mg/dL 0 51         Results from last 7 days   Lab Units 09/17/22  0630 09/16/22  1116   GLUCOSE RANDOM mg/dL 99 86         Results from last 7 days   Lab Units 09/16/22  1116   HEMOGLOBIN A1C % 4 5   EAG mg/dl 82           Results from last 7 days   Lab Units 09/16/22  1638 09/16/22  1324 09/16/22  1116   HS TNI 0HR ng/L  --   --  5   HS TNI 2HR ng/L  --  5  --    HSTNI D2 ng/L  --  0  --    HS TNI 4HR ng/L 6  --   --    HSTNI D4 ng/L 1  --   --          Results from last 7 days   Lab Units 09/16/22  1116   PROTIME seconds 30 4*   INR  2 99*   PTT seconds 32               Results from last 7 days   Lab Units 09/16/22  1116   INFLUENZA A PCR  Negative   INFLUENZA B PCR  Negative   RSV PCR  Negative                 ED Treatment:   Medication Administration from 09/16/2022 1038 to 09/17/2022 0958       Date/Time Order Dose Route Action Comments     09/16/2022 1240 potassium chloride (K-DUR,KLOR-CON) CR tablet 40 mEq 40 mEq Oral Given      09/16/2022 1217 iohexol (OMNIPAQUE) 350 MG/ML injection (SINGLE-DOSE) 80 mL 80 mL Intravenous Given      09/16/2022 1222 iohexol (OMNIPAQUE) 350 MG/ML injection (SINGLE-DOSE) 80 mL 80 mL Intravenous Given      09/16/2022 2319 diphenhydrAMINE (BENADRYL) tablet 50 mg 50 mg Oral Given      09/17/2022 0630 magnesium oxide (MAG-OX) tablet 400 mg 400 mg Oral Given      09/16/2022 2320 magnesium oxide (MAG-OX) tablet 400 mg 400 mg Oral Given      09/16/2022 1608 magnesium oxide (MAG-OX) tablet 400 mg 400 mg Oral Given      09/16/2022 1748 warfarin (COUMADIN) tablet 7 5 mg 7 5 mg Oral Given      09/16/2022 1608 fluticasone-vilanterol (BREO ELLIPTA) 100-25 mcg/inh inhaler 1 puff 1 puff Inhalation Given      09/16/2022 1608 atorvastatin (LIPITOR) tablet 40 mg 40 mg Oral Given      09/17/2022 0958 ondansetron (ZOFRAN) 4 mg/2 mL injection **ADS Override Pull** 4 mg  Given         Present on Admission:   [facility]deep [facility] thrombosis) ([facility])   Hypertension   Asthma   HIV disease ([facility] )      Admitting Diagnosis: SOB (shortness of breath) [R06 02]  Age/Sex: 65 y o  male  Admission Orders:  Scheduled Medications:  atorvastatin, 40 mg, Oral, Daily With Dinner  diphenhydrAMINE, 50 mg, Oral, HS  fluticasone-vilanterol, 1 puff, Inhalation, Daily  magnesium oxide, 400 mg, Oral, Q8H  warfarin, 10 mg, Oral, Once per day on Sun Tue Thu Sat  warfarin, 7 5 mg, Oral, Once per day on Mon Wed Fri      Continuous IV Infusions:     PRN Meds:  acetaminophen, 650 mg, Oral, Q6H PRN  albuterol, 2 puff, Inhalation, Q4H PRN  cyclobenzaprine, 10 mg, Oral, BID PRN    24 hr tele    IP CONSULT TO NEUROLOGY    Network Utilization Review Department  ATTENTION: Please call with any questions or concerns to [phone] and carefully listen to the prompts so that you are directed to the right person   All voicemails are confidential   Katie Chavis all requests for admission clinical reviews, approved or denied determinations and any other requests to dedicated fax number below belonging to the campus where the patient is receiving treatment   List of dedicated fax numbers for the Facilities:  [address] [place] [address] DENIALS (Administrative/Medical Necessity) [phone]   [address] (Maternity/NICU/Pediatrics) [phone]   [address]  [address] [place] [address] [address] [place] [address] [address] Karishma [address] [facility] [place] [address] [phone]

## 2022-09-17 NOTE — ASSESSMENT & PLAN NOTE
· Patient with right-sided weakness that started about 3 days ago  · May be secondary to TIA versus stroke although less likely given INR within goal  · CTA PE study-no pulmonary embolism noted; mild background emphysematous changes and multifocal linear scarring/atelectasis changes; benign sclerosis of T12 vertebral body  · CTA head neck-no acute intracranial abnormality on non con CT head; dental disease with mixed sclerosis and lucency throughout axilla indicative of chronic maxillary infection  · Will check hemoglobin A1c and lipid panel  · Will start on atorvastatin 40 mg daily  · Continue with Coumadin    Echocardiogram pending

## 2022-09-17 NOTE — PROGRESS NOTES
Progress Note - Neurology   Belkys Anderson 65 y o  male MRN: [id number]  Unit/Bed#: ED 08 Encounter: [id number]      Assessment/Plan   * Right sided weakness  Assessment & Plan  65year old male with history of prior reported strokes with residual L sided deficits, recurrent DVT history on anticoagulation at baseline (Coumadin), HIV, HTN, MICHEL       Presented on 09/16 following few days duration of atypical right-sided weakness, accompanied by severe headache on 09/15 (which has since resolved) and ongoing shortness of breath      Similar episode of R sided weakness approximately one year (lasted half hour and then resolved; did not seek medical attention at that time)       Sub-optimal/inconsistent effort on initial neurologic exam yesterday; additionally therapeutic INR at 2 99 on admission   MRI brain overnight negative for acute infarct nor other intracranial pathology      Plan:  -stroke pathway ongoing, but can discontinue stroke pathway as MRI negative for new CVA:  -CTA head/neck negative for acute intracranial pathology; from vascular standpoint without any significant stenosis/flow restrictive disease, nor any LVO  -MRI brain overnight negative for acute infarct  -CTA chest negative for PE  -2D echocardiogram pending; no need to obtain from neurologic standpoint if otherwise stable for discharge  -on Coumadin prior to admission; INR on admission at 2 99, can continue on Coumadin  -no need for antiplatelet initiation during workup  -continue lipitor 40 mg daily  -hemoglobin A1C of 4 5, lipid panel with LDL of 84  -normotensive goals  -neuro checks  -telemetry monitoring  -provide stroke education  -therapy evaluations (PT/OT)    No further inpatient neurologic as MRI brain negative, ok from neurologic standpoint for discharge when deemed ready by therapies and primary team  Discussed plan of care with attending neurologist      Belkys Anderson will need follow up in in 3 months with neurovascular attending or advance practitioner  He will not require outpatient neurological testing  Subjective:   Patient resting in bed, doing ok today, feels slightly better overall  No new/inerim neurologic deficits  Vitals: Blood pressure 134/85, pulse 80, temperature 98 5 °F (36 9 °C), temperature source Oral, resp  rate 20, height 5' 9" (1 753 m), weight 78 9 kg (174 lb), SpO2 96 %  ,Body mass index is 25 7 kg/m²  Physical Exam:  Physical Exam  Constitutional:       Appearance: Normal appearance  HENT:      Head: Normocephalic and atraumatic  Eyes:      Extraocular Movements: Extraocular movements intact and EOM normal       Conjunctiva/sclera: Conjunctivae normal       Pupils: Pupils are equal, round, and reactive to light  Cardiovascular:      Rate and Rhythm: Normal rate  Pulmonary:      Effort: Pulmonary effort is normal    Abdominal:      General: There is no distension  Musculoskeletal:      Cervical back: Normal range of motion and neck supple  Skin:     General: Skin is warm and dry  Neurological:      Mental Status: He is alert  Coordination: Finger-Nose-Finger Test and Heel to [employer] normal    Psychiatric:         Speech: Speech normal         Neurologic Exam     Mental Status   Attention: normal  Concentration: normal    Speech: speech is normal   Normal comprehension  Cranial Nerves     CN II   Visual fields full to confrontation  CN III, IV, VI   Pupils are equal, round, and reactive to light  Extraocular motions are normal      CN V   Facial sensation intact  CN VII   Facial expression full, symmetric  CN VIII   CN VIII normal      CN IX, X   CN IX normal    CN X normal      CN XI   CN XI normal      CN XII   CN XII normal      Motor Exam Still with mild-moderate proximal L UE and LE from prior strokes; no obvious drift/focal deficits in the R UE and LE; again giveaway weakness with full testing        Sensory Exam   Light touch normal      Gait, Coordination, and Reflexes Coordination   Finger to nose coordination: normal  Heel to shin coordination: normal    Tremor   Resting tremor: absent  Intention tremor: absent      Lab, Imaging and other studies:   MRI brain wo contrast   Final Result by Starla Headley MD (09/17 1710)      1  No acute infarction, edema, or mass effect  2   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 injury  3   Mild mucosal thickening involving the maxillary sinuses, incompletely imaged  Workstation performed: [id number]         CTA ED chest PE Study   Final Result by Harsh Miller MD (09/16 1322)      No pulmonary embolus  Mild background emphysematous changes and multifocal linear  scarring/atelectatic change  No acute CT findings to account for the patient's symptoms  Nonspecific but probably benign sclerosis in the T12 vertebral body statistically most likely to represent fibrous dysplasia or sclerotic hemangioma  If there is back pain, further evaluation with bone scan may prove useful  Workstation performed: [id number]         CTA head and neck with and without contrast   Final Result by Harsh Miller MD (09/16 0137)      No acute intracranial abnormality on noncontrast head CT  No dissection or flow-limiting vascular stenosis in the neck  No intracranial cerebral arterial vascular stenosis or occlusion  Dental disease with mixed sclerosis and lucency throughout the maxilla could indicate chronic maxillary infection  Follow-up dental evaluation is recommended  This examination was marked "immediate notification" in Epic in order to begin the standard process by which the radiology reading room liaison alerts the referring practitioner  Workstation performed: [id number]         XR chest 2 views   Final Result by June Salazar MD (09/16 9502)      No acute cardiopulmonary disease                    Workstation performed: [id number]             CBC:   Results from last 7 days   Lab Units 09/17/22  0630 09/16/22  1116   WBC Thousand/uL 5 59 5 27   RBC Million/uL 3 43* 3 83*   HEMOGLOBIN g/dL 10 9* 12 2   HEMATOCRIT % 31 5* 35 2*   MCV fL 92 92   PLATELETS Thousands/uL 221 236   , BMP/CMP:   Results from last 7 days   Lab Units 09/17/22  0630 09/16/22  1116   SODIUM mmol/L 142 140   POTASSIUM mmol/L 3 4* 2 9*   CHLORIDE mmol/L 107 106   CO2 mmol/L 27 28   BUN mg/dL 9 8   CREATININE mg/dL 0 70 0 75   CALCIUM mg/dL 9 1 9 2   AST U/L  --  17   ALT U/L  --  26   ALK PHOS U/L  --  104   EGFR ml/min/1 73sq m 105 102   , Vitamin B12:   , HgBA1C:   Results from last 7 days   Lab Units 09/16/22  1116   HEMOGLOBIN A1C % 4 5   , TSH:   , Coagulation:   Results from last 7 days   Lab Units 09/16/22  1116   INR  2 99*   , Lipid Profile:   Results from last 7 days   Lab Units 09/16/22  1116   HDL mg/dL 49   LDL CALC mg/dL 84   TRIGLYCERIDES mg/dL 32     VTE Prophylaxis: Sequential compression device (Venodyne)  and Warfarin (Coumadin)    Total time spent today 20 minutes  Discussed plan of care with patient and primary team: reviewed MRI brain, negative for any new findings, continue Coumadin/statin, supportive care/therapies, no need for additional neurologic workup

## 2022-09-17 NOTE — PLAN OF CARE
Recommendations: regular diet and thin liquids     Recommended Form of Meds: as tolerated     Aspiration precautions and compensatory swallowing strategies: upright posture, slow rate of feeding and small bites/sips    Dysphagia Goals: pt will tolerate regular textures with thin liquids without s/s of aspiration x3 and pt will demonstrate accurate use of upright positioning with slow rate/small bites strategy with 80% accuracy given no cues

## 2022-09-17 NOTE — PLAN OF CARE
Problem: PHYSICAL THERAPY ADULT  Goal: Performs mobility at highest level of function for planned discharge setting  See evaluation for individualized goals  Description: Treatment/Interventions: LE strengthening/ROM, ADL retraining, Functional transfer training, Therapeutic exercise, Elevations, Endurance training, Patient/family training, Bed mobility, Gait training, Compensatory technique education, Spoke to nursing, OT          See flowsheet documentation for full assessment, interventions and recommendations  Outcome: Progressing  Note: Prognosis: Good  Problem List: Decreased strength, Decreased endurance, Impaired balance, Decreased mobility  Assessment: Pt is 65 y o  male seen for PT evaluation s/p admit to [facility] on 9/16/2022 w/ Right sided weakness  PT consulted to assess pt's functional mobility and d/c needs  Order placed for PT eval and tx, w/ up w/ A order  Comorbidities affecting pt's physical performance at time of assessment include: right sided weakness, SOB, HTN, HIV, DVT, asthma  PTA, pt was independent w/ all functional mobility w/ no device, ambulates household distances, has 8 BENSON, lives w/ daughter and KRISTI in two level house and on disability  Personal factors affecting pt at time of IE include: stairs to enter home, inability to navigate community distances, unable to perform dynamic tasks in community, positive fall history, inability to perform IADLs and inability to perform ADLs  Please find objective findings from PT assessment regarding body systems outlined above with impairments and limitations including weakness, impaired balance, decreased endurance, gait deviations, decreased activity tolerance, decreased functional mobility tolerance, fall risk and SOB upon exertion  The following objective measures performed on IE also reveal limitations: Barthel Index: 60/100, Modified [place]: 3 (moderate disability) and AM-PAC 6-Clicks: 71/47   Pt's clinical presentation is currently evolving seen in pt's presentation of continued need for medical management and monitoring, decreased strength and balance resulting in an increased risk for falls, SOB with activity and decreased activity tolerance limiting overall mobility  Pt to benefit from continued PT tx to address deficits as defined above and maximize level of functional independent mobility and consistency  From PT/mobility standpoint, recommendation at time of d/c would be home with home health rehabilitation pending progress in order to facilitate return to PLOF  Barriers to Discharge: Inaccessible home environment     PT Discharge Recommendation: Home with home health rehabilitation    See flowsheet documentation for full assessment

## 2022-09-17 NOTE — PLAN OF CARE
Problem: Potential for Falls  Goal: Patient will remain free of falls  Description: INTERVENTIONS:  - Educate patient/family on patient safety including physical limitations  - Instruct patient to call for assistance with activity   - Consult OT/PT to assist with strengthening/mobility   - Keep Call bell within reach  - Keep bed low and locked with side rails adjusted as appropriate  - Keep care items and personal belongings within reach  - Initiate and maintain comfort rounds  - Make Fall Risk Sign visible to staff  - Offer Toileting every 2Hours, in advance of need  - Initiate/Maintain alarm  - Obtain necessary fall risk management equipment:   - Apply yellow socks and bracelet for high fall risk patients  - Consider moving patient to room near nurses station  Outcome: Progressing     Problem: PAIN - ADULT  Goal: Verbalizes/displays adequate comfort level or baseline comfort level  Description: Interventions:  - Encourage patient to monitor pain and request assistance  - Assess pain using appropriate pain scale  - Administer analgesics based on type and severity of pain and evaluate response  - Implement non-pharmacological measures as appropriate and evaluate response  - Consider cultural and social influences on pain and pain management  - Notify physician/advanced practitioner if interventions unsuccessful or patient reports new pain  Outcome: Progressing     Problem: INFECTION - ADULT  Goal: Absence or prevention of progression during hospitalization  Description: INTERVENTIONS:  - Assess and monitor for signs and symptoms of infection  - Monitor lab/diagnostic results  - Monitor all insertion sites, i e  indwelling lines, tubes, and drains  - Monitor endotracheal if appropriate and nasal secretions for changes in amount and color  - Victor appropriate cooling/warming therapies per order  - Administer medications as ordered  - Instruct and encourage patient and family to use good hand hygiene technique  - Identify and instruct in appropriate isolation precautions for identified infection/condition  Outcome: Progressing  Goal: Absence of fever/infection during neutropenic period  Description: INTERVENTIONS:  - Monitor WBC    Outcome: Progressing     Problem: SAFETY ADULT  Goal: Patient will remain free of falls  Description: INTERVENTIONS:  - Educate patient/family on patient safety including physical limitations  - Instruct patient to call for assistance with activity   - Consult OT/PT to assist with strengthening/mobility   - Keep Call bell within reach  - Keep bed low and locked with side rails adjusted as appropriate  - Keep care items and personal belongings within reach  - Initiate and maintain comfort rounds  - Make Fall Risk Sign visible to staff  - Offer Toileting every 2 Hours, in advance of need  - Initiate/Maintain alarm  - Obtain necessary fall risk management equipment:   - Apply yellow socks and bracelet for high fall risk patients  - Consider moving patient to room near nurses station  Outcome: Progressing  Goal: Maintain or return to baseline ADL function  Description: INTERVENTIONS:  -  Assess patient's ability to carry out ADLs; assess patient's baseline for ADL function and identify physical deficits which impact ability to perform ADLs (bathing, care of mouth/teeth, toileting, grooming, dressing, etc )  - Assess/evaluate cause of self-care deficits   - Assess range of motion  - Assess patient's mobility; develop plan if impaired  - Assess patient's need for assistive devices and provide as appropriate  - Encourage maximum independence but intervene and supervise when necessary  - Involve family in performance of ADLs  - Assess for home care needs following discharge   - Consider OT consult to assist with ADL evaluation and planning for discharge  - Provide patient education as appropriate  Outcome: Progressing  Goal: Maintains/Returns to pre admission functional level  Description: INTERVENTIONS:  - Perform BMAT or MOVE assessment daily    - Set and communicate daily mobility goal to care team and patient/family/caregiver  - Collaborate with rehabilitation services on mobility goals if consulted  - Perform Range of Motion 3 times a day  - Reposition patient every 2 hours  - Dangle patient 3 times a day  - Stand patient 3 times a day  - Ambulate patient 3 times a day  - Out of bed to chair 3 times a day   - Out of bed for meals 3 times a day  - Out of bed for toileting  - Record patient progress and toleration of activity level   Outcome: Progressing     Problem: DISCHARGE PLANNING  Goal: Discharge to home or other facility with appropriate resources  Description: INTERVENTIONS:  - Identify barriers to discharge w/patient and caregiver  - Arrange for needed discharge resources and transportation as appropriate  - Identify discharge learning needs (meds, wound care, etc )  - Arrange for interpretive services to assist at discharge as needed  - Refer to Case Management Department for coordinating discharge planning if the patient needs post-hospital services based on physician/advanced practitioner order or complex needs related to functional status, cognitive ability, or social support system  Outcome: Progressing     Problem: Knowledge Deficit  Goal: Patient/family/caregiver demonstrates understanding of disease process, treatment plan, medications, and discharge instructions  Description: Complete learning assessment and assess knowledge base    Interventions:  - Provide teaching at level of understanding  - Provide teaching via preferred learning methods  Outcome: Progressing

## 2022-09-17 NOTE — PROGRESS NOTES
[facility]  Progress Note - Gregg Howe 1965, 65 y o  male MRN: [id number]  Unit/Bed#: ED 08 Encounter: [id number]  Primary Care Provider: No primary care provider on file  Date and time admitted to hospital: 9/16/2022 10:39 AM    Hypokalemia  Assessment & Plan  · Replete and monitor    SOB (shortness of breath)  Assessment & Plan  · Patient having shortness of breath which is worth with exertion but denies any associated chest pain, palpitations, lightheadedness or dizziness  · May be secondary to underlying emphysema versus poor medication compliance  · CTA PE study-no pulmonary embolism noted; mild background emphysematous changes and multifocal linear scarring/atelectasis changes; benign sclerosis of T12 vertebral body  · Recommend 6 minute walk test prior to discharge if patient were to need oxygen  · Breo started  · Nebulizers as needed  · If shortness of breath not improving possible pulmonology referral on discharge    Hypertension  Assessment & Plan  · Patient noted to have slightly elevated blood pressure on presentation  · Will allow for permissive hypertension given concern for TIA  · Continue to monitor    HIV disease ([facility] [address] )  Assessment & Plan  · Continue antiretrovirals    DVT (deep venous thrombosis) ([facility])  Assessment & Plan  · Continue home Coumadin    Therapeutic  · INR goal 2-3    Asthma  Assessment & Plan  · Lungs clear to auscultation on examination  · Continue to monitor    * Right sided weakness  Assessment & Plan  · Patient with right-sided weakness that started about 3 days ago  · May be secondary to TIA versus stroke although less likely given INR within goal  · CTA PE study-no pulmonary embolism noted; mild background emphysematous changes and multifocal linear scarring/atelectasis changes; benign sclerosis of T12 vertebral body  · CTA head neck-no acute intracranial abnormality on non con CT head; dental disease with mixed sclerosis and lucency throughout axilla indicative of chronic maxillary infection  · Will check hemoglobin A1c and lipid panel  · Will start on atorvastatin 40 mg daily  · Continue with Coumadin  Echocardiogram pending        VTE Pharmacologic Prophylaxis:   Pharmacologic: Warfarin (Coumadin)  Mechanical VTE Prophylaxis in Place: Yes    Patient Centered Rounds: I have performed bedside rounds with nursing staff today  Time Spent for Care: 20 minutes  More than 50% of total time spent on counseling and coordination of care as described above  Current Length of Stay: 0 day(s)    Current Patient Status: Inpatient   Certification Statement:  The patient was admitted under observation but will now need greater than 2 midnights secondary to still having some residual right-sided weakness and needing an echocardiogram for complete neurological workup    Discharge Plan:  Hopeful discharge tomorrow    Code Status: Level 1 - Full Code      Subjective:   Patient seen examined  He states he is doing okay  Still feels little short of breath    Objective:     Vitals:   Temp (24hrs), Av 6 °F (37 °C), Min:98 5 °F (36 9 °C), Max:98 6 °F (37 °C)    Temp:  [98 5 °F (36 9 °C)-98 6 °F (37 °C)] 98 5 °F (36 9 °C)  HR:  [] 83  Resp:  [15-22] 18  BP: (117-172)/() 127/80  SpO2:  [94 %-99 %] 96 %  Body mass index is 25 7 kg/m²       Input and Output Summary (last 24 hours):     No intake or output data in the 24 hours ending 22 1040    Physical Exam:     Physical Exam  (   General Appearance:    Alert, cooperative, no distress, appears stated age                               Lungs:     Clear to auscultation bilaterally, respirations unlabored       Heart:    Regular rate and rhythm, S1 and S2 normal, no murmur, rub    or gallop   Abdomen:     Soft, non-tender, bowel sounds active all four quadrants,     no masses, no organomegaly           Extremities:   Extremities normal, atraumatic, no cyanosis or edema               Neurologic:   Mild right-sided weakness         Additional Data:     Labs:    Results from last 7 days   Lab Units 09/17/22  0630 09/16/22  1116   WBC Thousand/uL 5 59 5 27   HEMOGLOBIN g/dL 10 9* 12 2   HEMATOCRIT % 31 5* 35 2*   PLATELETS Thousands/uL 221 236   NEUTROS PCT %  --  65   LYMPHS PCT %  --  27   MONOS PCT %  --  7   EOS PCT %  --  0     Results from last 7 days   Lab Units 09/17/22  0630 09/16/22  1116   SODIUM mmol/L 142 140   POTASSIUM mmol/L 3 4* 2 9*   CHLORIDE mmol/L 107 106   CO2 mmol/L 27 28   BUN mg/dL 9 8   CREATININE mg/dL 0 70 0 75   ANION GAP mmol/L 8 6   CALCIUM mg/dL 9 1 9 2   ALBUMIN g/dL  --  3 8   TOTAL BILIRUBIN mg/dL  --  0 51   ALK PHOS U/L  --  104   ALT U/L  --  26   AST U/L  --  17   GLUCOSE RANDOM mg/dL 99 86     Results from last 7 days   Lab Units 09/16/22  1116   INR  2 99*         Results from last 7 days   Lab Units 09/16/22  1116   HEMOGLOBIN A1C % 4 5               * I Have Reviewed All Lab Data Listed Above  * Additional Pertinent Lab Tests Reviewed:  [facility] Admission Reviewed        Recent Cultures (last 7 days):           Last 24 Hours Medication List:   Current Facility-Administered Medications   Medication Dose Route Frequency Provider Last Rate    acetaminophen  650 mg Oral Q6H PRN Derrell Patella, DO      albuterol  2 puff Inhalation Q4H PRN Derrell Patella, DO      atorvastatin  40 mg Oral Daily With Clinton Ohara, DO      cyclobenzaprine  10 mg Oral BID PRN Derrell Patella, DO      diphenhydrAMINE  50 mg Oral HS Derrell Patella, DO      fluticasone-vilanterol  1 puff Inhalation Daily Derrell Patella, DO      magnesium oxide  400 mg Oral Q8H Derrell Patella, DO      warfarin  10 mg Oral Once per day on Sun Tue Thu Sat Derrell Patella, DO      warfarin  7 5 mg Oral Once per day on Mon Wed Fri Derrell Patella, DO          Today, Patient Was Seen By: Lizzy Adrian MD    ** Please Note: Dictation voice to text software may have been used in the creation of this document   **

## 2022-09-17 NOTE — PHYSICAL THERAPY NOTE
Physical Therapy Evaluation     Patient's Name: Randal Juárez    Admitting Diagnosis  SOB (shortness of breath) [R06 02]    Problem List  Patient Active Problem List   Diagnosis    Anemia    Arthritis    Asthma    DVT (deep venous thrombosis) (HCC)    Gastritis    Hip pain, right    HIV disease (HCC)    Hypertension    Latent tuberculosis    Insomnia    Nicotine dependence    Night sweats    Protein-calorie malnutrition (HCC)    Pulmonary embolism (HCC)    Avascular necrosis of right femur (HCC)    Poor dentition    Chronic pain of both shoulders    Right sided weakness    SOB (shortness of breath)    Hypokalemia       Past Medical History  Past Medical History:   Diagnosis Date    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 ([address] )     Closed fracture of left hip, with malunion, subsequent encounter     Erectile dysfunction     Hypertension     Obstructive sleep apnea, adult     Stroke [facility])        Past Surgical History  Past Surgical History:   Procedure Laterality Date    HIP SURGERY      TOTAL HIP ARTHROPLASTY Left         09/17/22 0822   PT Last Visit   PT Visit Date 09/17/22   Note Type   Note type Evaluation   Pain Assessment   Pain Assessment Tool 0-10   Pain Score No Pain   Restrictions/Precautions   Weight Bearing Precautions Per Order No   Braces or Orthoses Other (Comment)  (R knee brace PRN)   Other Precautions Fall Risk;Telemetry   Home Living   Type of [address] [place] [address] One level;Stairs to enter with rails  (8 BENSON with HR, then 10 steps to basement with HR where bedroom is located   Needs to climb 10 steps to first floor for bathroom)   Bathroom Shower/Tub Tub/shower unit   Bathroom Toilet Standard   Bathroom Equipment Other (Comment)  (none per patient)   [address] Other (Comment)  (none per patient)   Additional Comments Ambulates without device at baseline   Prior Function   Level of [place] Independent with ADLs and functional mobility   Lives With Junior Wong From Family   ADL Assistance Independent   IADLs Needs assistance  (daughter does cooking, pt performed laundry)   Falls in the last 6 months 1 to 4  (2 times tripping up the stairs)   Vocational On disability   Comments (-) drives   General   Family/Caregiver Present No   Cognition   Overall Cognitive Status WFL   Arousal/Participation Alert   Orientation Level Oriented X4   Memory Within functional limits   Following Commands Follows all commands and directions without difficulty   Comments Pt agreeable to PT   RLE Assessment   RLE Assessment X   Strength RLE   RLE Overall Strength 4-/5   LLE Assessment   LLE Assessment X   Strength LLE   LLE Overall Strength 4/5   Light Touch   RLE Light Touch Grossly intact   LLE Light Touch Grossly intact   Bed Mobility   Supine to Sit 5  Supervision   Additional items Assist x 1;Bedrails;HOB elevated;Verbal cues   Sit to Supine 5  Supervision   Additional items Assist x 1;Bedrails;Verbal cues;HOB elevated   Transfers   Sit to Stand 5  Supervision   Additional items Assist x 1; Armrests; Verbal cues   Stand to Sit 5  Supervision   Additional items Assist x 1; Armrests; Verbal cues   Additional Comments without device   Ambulation/Elevation   Gait pattern Decreased foot clearance; Inconsistent blade; Short stride; Excessively slow;Decreased heel strike;Narrow CRYSTAL   Gait Assistance 5  Supervision   Additional items Assist x 1;Verbal cues   Assistive Device None   Distance 80'   Stair Management Assistance   (CGA)   Additional items Assist x 1;Verbal cues   Stair Management Technique One rail R;Foreward;Backward  (practice step)   Number of Stairs 10   Balance   Static Sitting Good   Dynamic Sitting Fair +   Static Standing Fair +   Dynamic Standing Fair   Ambulatory Fair   Endurance Deficit   Endurance Deficit Yes   Endurance Deficit Description decreased activity tolerance and endurance   Activity Tolerance   Activity Tolerance Patient limited by fatigue   Nurse Made Aware VARSHA Person Assessment   Prognosis Good   Problem List Decreased strength;Decreased endurance; Impaired balance;Decreased mobility   Assessment Pt is 65 y o  male seen for PT evaluation s/p admit to [facility] on 9/16/2022 w/ Right sided weakness  PT consulted to assess pt's functional mobility and d/c needs  Order placed for PT eval and tx, w/ up w/ A order  Comorbidities affecting pt's physical performance at time of assessment include: right sided weakness, SOB, HTN, HIV, DVT, asthma  PTA, pt was independent w/ all functional mobility w/ no device, ambulates household distances, has 8 BENSON, lives w/ daughter and KRISTI in two level house and on disability  Personal factors affecting pt at time of IE include: stairs to enter home, inability to navigate community distances, unable to perform dynamic tasks in community, positive fall history, inability to perform IADLs and inability to perform ADLs  Please find objective findings from PT assessment regarding body systems outlined above with impairments and limitations including weakness, impaired balance, decreased endurance, gait deviations, decreased activity tolerance, decreased functional mobility tolerance, fall risk and SOB upon exertion  The following objective measures performed on IE also reveal limitations: Barthel Index: 60/100, Modified Sadie: 3 (moderate disability) and AM-PAC 6-Clicks: 37/53  Pt's clinical presentation is currently evolving seen in pt's presentation of continued need for medical management and monitoring, decreased strength and balance resulting in an increased risk for falls, SOB with activity and decreased activity tolerance limiting overall mobility  Pt to benefit from continued PT tx to address deficits as defined above and maximize level of functional independent mobility and consistency   From PT/mobility standpoint, recommendation at time of d/c would be home with home health rehabilitation pending progress in order to facilitate return to PLOF  Barriers to Discharge Inaccessible home environment   Goals   STG Expiration Date 09/27/22   Short Term Goal #1 In 7-10 days: Perform all transfers modified independent to improve independence, Ambulate > 200 ft  with least restrictive assistive device modified independent w/o LOB and w/ normalized gait pattern 100% of the time, Navigate 12 stairs modified independent with unilateral handrail to facilitate return to previous living environment, Tolerate standing 10 minutes to facilitate functional task performance and PT provider will perform functional balance assessment to determine fall risk   Plan   Treatment/Interventions LE strengthening/ROM;ADL retraining;Functional transfer training; Therapeutic exercise;Elevations; Endurance training;Patient/family training;Bed mobility;Gait training; Compensatory technique education;Spoke to nursing;OT   PT Frequency 3-5x/wk   Recommendation   PT Discharge Recommendation Home with home health rehabilitation   AM-PAC Basic Mobility Inpatient   Turning in Bed Without Bedrails 3   Lying on Back to Sitting on Edge of Flat Bed 3   Moving Bed to Chair 3   Standing Up From Chair 3   Walk in Room 3   Climb 3-5 Stairs 3   Basic Mobility Inpatient Raw Score 18   Basic Mobility Standardized Score 41 05   Highest Level Of Mobility   -HLM Goal 6: Walk 10 steps or more   JH-HLM Achieved 7: Walk 25 feet or more   Modified Thoreau Scale   Modified Thoreau Scale 3   Barthel Index   Feeding 10   Bathing 0   Grooming Score 5   Dressing Score 5   Bladder Score 10   Bowels Score 10   Toilet Use Score 5   Transfers (Bed/Chair) Score 10   Mobility (Level Surface) Score 0   Stairs Score 5   Barthel Index Score 60       Melia aYng, PT

## 2022-09-18 ENCOUNTER — APPOINTMENT (INPATIENT)
Dept: NON INVASIVE DIAGNOSTICS | Facility: HOSPITAL | Age: 57
End: 2022-09-18
Payer: COMMERCIAL

## 2022-09-18 VITALS
BODY MASS INDEX: 25.77 KG/M2 | RESPIRATION RATE: 18 BRPM | WEIGHT: 174 LBS | OXYGEN SATURATION: 96 % | HEIGHT: 69 IN | HEART RATE: 73 BPM | DIASTOLIC BLOOD PRESSURE: 88 MMHG | SYSTOLIC BLOOD PRESSURE: 131 MMHG | TEMPERATURE: 98.2 F

## 2022-09-18 PROBLEM — E87.6 HYPOKALEMIA: Status: RESOLVED | Noted: 2022-09-16 | Resolved: 2022-09-18

## 2022-09-18 LAB
ANION GAP SERPL CALCULATED.3IONS-SCNC: 8 MMOL/L (ref 4–13)
AORTIC ROOT: 3 CM
APICAL FOUR CHAMBER EJECTION FRACTION: 52 %
AV LVOT PEAK GRADIENT: 4 MMHG
AV PEAK GRADIENT: 9 MMHG
BUN SERPL-MCNC: 12 MG/DL (ref 5–25)
CALCIUM SERPL-MCNC: 9.1 MG/DL (ref 8.3–10.1)
CHLORIDE SERPL-SCNC: 104 MMOL/L (ref 96–108)
CO2 SERPL-SCNC: 27 MMOL/L (ref 21–32)
CREAT SERPL-MCNC: 0.7 MG/DL (ref 0.6–1.3)
E WAVE DECELERATION TIME: 221 MS
FRACTIONAL SHORTENING: 24 % (ref 28–44)
GFR SERPL CREATININE-BSD FRML MDRD: 105 ML/MIN/1.73SQ M
GLUCOSE SERPL-MCNC: 93 MG/DL (ref 65–140)
INR PPP: 2.85 (ref 0.84–1.19)
INTERVENTRICULAR SEPTUM IN DIASTOLE (PARASTERNAL SHORT AXIS VIEW): 0.7 CM
INTERVENTRICULAR SEPTUM: 0.7 CM (ref 0.6–1.1)
LAAS-AP2: 18.1 CM2
LAAS-AP4: 19.8 CM2
LEFT ATRIUM AREA SYSTOLE SINGLE PLANE A4C: 19.7 CM2
LEFT ATRIUM SIZE: 3.4 CM
LEFT INTERNAL DIMENSION IN SYSTOLE: 4.2 CM (ref 2.1–4)
LEFT VENTRICULAR INTERNAL DIMENSION IN DIASTOLE: 5.5 CM (ref 3.5–6)
LEFT VENTRICULAR POSTERIOR WALL IN END DIASTOLE: 0.9 CM
LEFT VENTRICULAR STROKE VOLUME: 74 ML
LVSV (TEICH): 74 ML
MV E'TISSUE VEL-SEP: 9 CM/S
MV PEAK A VEL: 0.61 M/S
MV PEAK E VEL: 59 CM/S
MV STENOSIS PRESSURE HALF TIME: 64 MS
MV VALVE AREA P 1/2 METHOD: 3.44 CM2
POTASSIUM SERPL-SCNC: 3.8 MMOL/L (ref 3.5–5.3)
PROTHROMBIN TIME: 29.3 SECONDS (ref 11.6–14.5)
RIGHT ATRIUM AREA SYSTOLE A4C: 17.5 CM2
RIGHT VENTRICLE ID DIMENSION: 3.8 CM
SL CV LEFT ATRIUM LENGTH A2C: 4.9 CM
SL CV PED ECHO LEFT VENTRICLE DIASTOLIC VOLUME (MOD BIPLANE) 2D: 150 ML
SL CV PED ECHO LEFT VENTRICLE SYSTOLIC VOLUME (MOD BIPLANE) 2D: 77 ML
SODIUM SERPL-SCNC: 139 MMOL/L (ref 135–147)
TR MAX PG: 25 MMHG
TR PEAK VELOCITY: 2.5 M/S
TRICUSPID VALVE PEAK REGURGITATION VELOCITY: 2.51 M/S

## 2022-09-18 PROCEDURE — 94760 N-INVAS EAR/PLS OXIMETRY 1: CPT

## 2022-09-18 PROCEDURE — 99239 HOSP IP/OBS DSCHRG MGMT >30: CPT | Performed by: FAMILY MEDICINE

## 2022-09-18 PROCEDURE — 80048 BASIC METABOLIC PNL TOTAL CA: CPT | Performed by: FAMILY MEDICINE

## 2022-09-18 PROCEDURE — 93306 TTE W/DOPPLER COMPLETE: CPT | Performed by: INTERNAL MEDICINE

## 2022-09-18 PROCEDURE — 93306 TTE W/DOPPLER COMPLETE: CPT

## 2022-09-18 PROCEDURE — 94664 DEMO&/EVAL PT USE INHALER: CPT

## 2022-09-18 PROCEDURE — 85610 PROTHROMBIN TIME: CPT | Performed by: FAMILY MEDICINE

## 2022-09-18 RX ADMIN — EMTRICITABINE AND TENOFOVIR ALAFENAMIDE 1 TABLET: 200; 25 TABLET ORAL at 11:30

## 2022-09-18 RX ADMIN — MAGNESIUM OXIDE TAB 400 MG (241.3 MG ELEMENTAL MG) 400 MG: 400 (241.3 MG) TAB at 08:38

## 2022-09-18 RX ADMIN — ACETAMINOPHEN 650 MG: 325 TABLET ORAL at 08:41

## 2022-09-18 RX ADMIN — PANTOPRAZOLE SODIUM 40 MG: 40 TABLET, DELAYED RELEASE ORAL at 06:00

## 2022-09-18 RX ADMIN — FLUTICASONE FUROATE AND VILANTEROL TRIFENATATE 1 PUFF: 100; 25 POWDER RESPIRATORY (INHALATION) at 08:37

## 2022-09-18 NOTE — ASSESSMENT & PLAN NOTE
· Patient having shortness of breath which is worth with exertion but denies any associated chest pain, palpitations, lightheadedness or dizziness  · May be secondary to underlying emphysema versus poor medication compliance  · CTA PE study-no pulmonary embolism noted; mild background emphysematous changes and multifocal linear scarring/atelectasis changes; benign sclerosis of T12 vertebral body  · Recommend 6 minute walk test prior to discharge if patient were to need oxygen  · Breo started  ·

## 2022-09-18 NOTE — ASSESSMENT & PLAN NOTE
· Patient with right-sided weakness that started about 3 days ago  · May be secondary to TIA versus stroke although less likely given INR within goal  · CTA PE study-no pulmonary embolism noted; mild background emphysematous changes and multifocal linear scarring/atelectasis changes; benign sclerosis of T12 vertebral body  · CTA head neck-no acute intracranial abnormality on non con CT head; dental disease with mixed sclerosis and lucency throughout axilla indicative of chronic maxillary infection  · Will check hemoglobin A1c and lipid panel  · Will start on atorvastatin 40 mg daily  · Continue with Coumadin    Echocardiogram is unremarkable

## 2022-09-18 NOTE — UTILIZATION REVIEW
Inpatient Admission Authorization Request   NOTIFICATION OF INPATIENT ADMISSION/INPATIENT AUTHORIZATION REQUEST   SERVICING FACILITY:   [address] [place] [address]  Tax ID: [id number]  NPI: [id number]  Place of Service: Inpatient [facility]  [address]  Place of Service Code: 24     ATTENDING PROVIDER:  Attending Name and NPI#: Shayne Ishan, Meli Leigh [[id number]]  Address: [address] [place] [address]  Phone: [phone]     UTILIZATION REVIEW CONTACT:  Gentry Miles Utilization   Network Utilization Review Department  Phone: [phone]  Fax [phone]  Email: [email or web address]     PHYSICIAN ADVISORY SERVICES:  FOR [id number] REVIEW - MEDICAL NECESSITY DENIAL  Phone: [phone]  Fax: [phone]  Email: [email or web address]     TYPE OF REQUEST:  Inpatient Status     ADMISSION INFORMATION:  ADMISSION DATE/TIME: 9/17/22 10:36 AM  PATIENT DIAGNOSIS CODE/DESCRIPTION:  TIA (transient ischemic attack) [G45 9]  SOB (shortness of breath) [R06 02]  Right sided weakness [R53 1]  DISCHARGE DATE/TIME: No discharge date for patient encounter  IMPORTANT INFORMATION:  Please contact Gentry Miles directly with any questions or concerns regarding this request  Department voicemails are confidential     Send requests for admission clinical reviews, concurrent reviews, approvals, and administrative denials due to lack of clinical to fax [phone]

## 2022-09-18 NOTE — CASE MANAGEMENT
Case Management Assessment & Discharge Planning Note    Patient name Latonya Mir  Location /-01 MRN [id number]  : 1965 Date 2022       Current Admission Date: 2022  Current Admission Diagnosis:Right sided weakness   Patient Active Problem List    Diagnosis Date Noted    Right sided weakness 2022    SOB (shortness of breath) 2022    Chronic pain of both shoulders 2018    Avascular necrosis of right femur ([facility] ) 2018    Poor dentition 2018    Insomnia 2018    Night sweats 2017    Arthritis 2017    Gastritis 2017    Hip pain, right 2017    Nicotine dependence 2017    Anemia 2017    Asthma 2017    [facility] 2017    HIV disease ([facility] ) 2017    Hypertension 2017    Latent tuberculosis 2017    Protein-calorie malnutrition ([facility] ) 2017    Pulmonary embolism ([facility] ) 2017      LOS (days): 1  Geometric Mean LOS (GMLOS) (days):   Days to GMLOS:     OBJECTIVE:    Risk of Unplanned Readmission Score: 15 22         Current admission status: Inpatient       Preferred Pharmacy:   [facility], [address] [facility] [address] [address]  Phone: [phone] Fax: [phone]    [facility]pharmacy [id number] [id number]  Phone: [phone] Fax: [address] [place], [place] - [address] [place] [address]  Phone: [phone] Fax: [phone]    Primary Care Provider: No primary care provider on file  Primary Insurance: [facility]  Secondary Insurance: [address]    ASSESSMENT:  [address] Proxies    There are no active Health Care Proxies on file         Advance Directives  Does patient have a Health Care POA?: No  Was patient offered paperwork?: Yes (CM supplied info)  Does patient currently have a Health Care decision maker?: Yes, please see Health Care Proxy section  Does patient have Advance Directives?: No  Was patient offered paperwork?: Yes (CM supplied info)  Primary Contact: lena Kusum Joshi         Readmission Root Cause  30 Day Readmission: No    Patient Information  Admitted from[de-identified] Home  Mental Status: Alert  During Assessment patient was accompanied by: Not accompanied during assessment  Assessment information provided by[de-identified] Patient  Primary Caregiver: Self  Support Systems: Daughter  [place] of Residence: [address] [place] [address] do you live in?: [place] entry access options  Select all that apply : Stairs  Number of steps to enter home : 8  Do the steps have railings?: Yes  Type of Current Residence: [place]  In the last 12 months, was there a time when you were not able to pay the mortgage or rent on time?: No  In the last 12 months, how many places have you lived?: 1  In the last 12 months, was there a time when you did not have a steady place to sleep or slept in a shelter (including now)?: No  Homeless/housing insecurity resource given?: No  Living Arrangements: Lives w/ Daughter (lives with Seble Mayo)  Is patient a ?: Yes  Is patient active with [facility])?: No    Activities of Daily Living Prior to Admission  Functional Status: Independent  Completes ADLs independently?: Yes  Ambulates independently?: Yes  Does patient use assisted devices?: No  Does patient currently own DME?: No  Does patient have a history of Outpatient Therapy (PT/OT)?: Yes  Does the patient have a history of Short-Term Rehab?: Yes (x2-in [place]    The first time was following a CVA/2nd time was following hip replacement)  Does patient have a history of HHC?: No  Does patient currently have [facility]?: No         Patient Information Continued  Income Source: Unemployed  Does patient have prescription coverage?: Yes  Within the past 12 months, you worried that your food would run out before you got the money to buy more : Never true  Within the past 12 months, the food you bought just didn't last and you didn't have money to get more : Never true  Food insecurity resource given?: No  Does patient receive dialysis treatments?: No  Does patient have a history of substance abuse?: No  Does patient have a history of Mental Health Diagnosis?: No    PHQ 2/9 Screening   Reviewed PHQ 2/9 Depression Screening Score?: No    Means of Transportation  Means of Transport to Appts[de-identified] Family transport  In the past 12 months, has lack of transportation kept you from medical appointments or from getting medications?: No  In the past 12 months, has lack of transportation kept you from meetings, work, or from getting things needed for daily living?: No  Was application for public transport provided?: No        DISCHARGE DETAILS:    Discharge planning discussed with[de-identified] patient  Freedom of Choice: Yes  Comments - Freedom of Choice: VNA pended per pt request  CM contacted family/caregiver?: No- see comments (pt will inform family himself)  Were Treatment Team discharge recommendations reviewed with patient/caregiver?: Yes  Did patient/caregiver verbalize understanding of patient care needs?: Yes  Were patient/caregiver advised of the risks associated with not following Treatment Team discharge recommendations?: Yes    Contacts  Patient Contacts: Riley Freeman  Relationship to Patient[de-identified] [address]         Is the patient interested in [facility] at discharge?: Yes  [address] requested[de-identified] Physical Therapy, Occupational Therapy, Shira [address] Agency Name[de-identified] [address] [place] [address] Provider[de-identified] PCP  Home Health Services Needed[de-identified] Evaluate Functional Status and Safety, Gait/ADL Training, Strengthening/Theraputic Exercises to Improve Function  Homebound Criteria Met[de-identified] Requires the Assistance of Another Person for Safe Ambulation or to Leave the Home  Supporting Clincal Findings[de-identified] Fatigues Easliy in [employer], Limited Endurance    DME Referral Provided  Referral made for DME?: No    Other Referral/Resources/Interventions Provided:  Interventions: C  Referral Comments: VNA pended    Would you like to participate in our [address] service program?  : No - Declined    Treatment Team Recommendation: Home with [address]  Discharge Destination Plan[de-identified] Home with [address]  Transport at Discharge : Family

## 2022-09-18 NOTE — PLAN OF CARE
Problem: Potential for Falls  Goal: Patient will remain free of falls  Description: INTERVENTIONS:  - Educate patient/family on patient safety including physical limitations  - Instruct patient to call for assistance with activity   - Consult OT/PT to assist with strengthening/mobility   - Keep Call bell within reach  - Keep bed low and locked with side rails adjusted as appropriate  - Keep care items and personal belongings within reach  - Initiate and maintain comfort rounds  - Make Fall Risk Sign visible to staff  - Offer Toileting every 2 Hours, in advance of need  - Initiate/Maintain bed alarm  - Obtain necessary fall risk management equipment  - Apply yellow socks and bracelet for high fall risk patients  - Consider moving patient to room near nurses station  Outcome: Progressing     Problem: PAIN - ADULT  Goal: Verbalizes/displays adequate comfort level or baseline comfort level  Description: Interventions:  - Encourage patient to monitor pain and request assistance  - Assess pain using appropriate pain scale  - Administer analgesics based on type and severity of pain and evaluate response  - Implement non-pharmacological measures as appropriate and evaluate response  - Consider cultural and social influences on pain and pain management  - Notify physician/advanced practitioner if interventions unsuccessful or patient reports new pain  Outcome: Progressing     Problem: INFECTION - ADULT  Goal: Absence or prevention of progression during hospitalization  Description: INTERVENTIONS:  - Assess and monitor for signs and symptoms of infection  - Monitor lab/diagnostic results  - Monitor all insertion sites, i e  indwelling lines, tubes, and drains  - Monitor endotracheal if appropriate and nasal secretions for changes in amount and color  - [place] appropriate cooling/warming therapies per order  - Administer medications as ordered  - Instruct and encourage patient and family to use good hand hygiene technique  - Identify and instruct in appropriate isolation precautions for identified infection/condition  Outcome: Progressing  Goal: Absence of fever/infection during neutropenic period  Description: INTERVENTIONS:  - Monitor WBC    Outcome: Progressing     Problem: SAFETY ADULT  Goal: Patient will remain free of falls  Description: INTERVENTIONS:  - Educate patient/family on patient safety including physical limitations  - Instruct patient to call for assistance with activity   - Consult OT/PT to assist with strengthening/mobility   - Keep Call bell within reach  - Keep bed low and locked with side rails adjusted as appropriate  - Keep care items and personal belongings within reach  - Initiate and maintain comfort rounds  - Make Fall Risk Sign visible to staff  - Offer Toileting every 2 Hours, in advance of need  - Initiate/Maintain bed alarm  - Obtain necessary fall risk management equipment  - Apply yellow socks and bracelet for high fall risk patients  - Consider moving patient to room near nurses station  Outcome: Progressing  Goal: Maintain or return to baseline ADL function  Description: INTERVENTIONS:  -  Assess patient's ability to carry out ADLs; assess patient's baseline for ADL function and identify physical deficits which impact ability to perform ADLs (bathing, care of mouth/teeth, toileting, grooming, dressing, etc )  - Assess/evaluate cause of self-care deficits   - Assess range of motion  - Assess patient's mobility; develop plan if impaired  - Assess patient's need for assistive devices and provide as appropriate  - Encourage maximum independence but intervene and supervise when necessary  - Involve family in performance of ADLs  - Assess for home care needs following discharge   - Consider OT consult to assist with ADL evaluation and planning for discharge  - Provide patient education as appropriate  Outcome: Progressing  Goal: Maintains/Returns to pre admission functional level  Description: INTERVENTIONS:  - Perform BMAT or MOVE assessment daily    - Set and communicate daily mobility goal to care team and patient/family/caregiver  - Collaborate with rehabilitation services on mobility goals if consulted  - Perform Range of Motion 3 times a day  - Reposition patient every 2 hours  - Dangle patient 3 times a day  - Stand patient 3 times a day  - Ambulate patient 3 times a day  - Out of bed to chair 3 times a day   - Out of bed for meals 3 times a day  - Out of bed for toileting  - Record patient progress and toleration of activity level   Outcome: Progressing     Problem: DISCHARGE PLANNING  Goal: Discharge to home or other facility with appropriate resources  Description: INTERVENTIONS:  - Identify barriers to discharge w/patient and caregiver  - Arrange for needed discharge resources and transportation as appropriate  - Identify discharge learning needs (meds, wound care, etc )  - Arrange for interpretive services to assist at discharge as needed  - Refer to Case Management Department for coordinating discharge planning if the patient needs post-hospital services based on physician/advanced practitioner order or complex needs related to functional status, cognitive ability, or social support system  Outcome: Progressing     Problem: Knowledge Deficit  Goal: Patient/family/caregiver demonstrates understanding of disease process, treatment plan, medications, and discharge instructions  Description: Complete learning assessment and assess knowledge base    Interventions:  - Provide teaching at level of understanding  - Provide teaching via preferred learning methods  Outcome: Progressing

## 2022-09-18 NOTE — DISCHARGE SUMMARY
[facility]  Discharge- Vernadine Constantineumet 1965, 65 y o  male MRN: [id number]  Unit/Bed#: -01 Encounter: [id number]  Primary Care Provider: No primary care provider on file  Date and time admitted to hospital: 9/16/2022 10:39 AM    SOB (shortness of breath)  Assessment & Plan  · Patient having shortness of breath which is worth with exertion but denies any associated chest pain, palpitations, lightheadedness or dizziness  · May be secondary to underlying emphysema versus poor medication compliance  · CTA PE study-no pulmonary embolism noted; mild background emphysematous changes and multifocal linear scarring/atelectasis changes; benign sclerosis of T12 vertebral body  · Recommend 6 minute walk test prior to discharge if patient were to need oxygen  · Breo started  ·     Hypertension  Assessment & Plan  · Patient noted to have slightly elevated blood pressure on presentation  · Will allow for permissive hypertension given concern for TIA  · Continue to monitor    [facility] disease ([facility] )  Assessment & Plan  · Continue home medications on discharge    DVT (deep venous thrombosis) ([facility])  Assessment & Plan  · Continue home Coumadin  Therapeutic  · INR goal 2-3    Asthma  Assessment & Plan  · Lungs clear to auscultation on examination  · Continue to monitor    Follow-up as an outpatient    * Right sided weakness  Assessment & Plan  · Patient with right-sided weakness that started about 3 days ago  · May be secondary to TIA versus stroke although less likely given INR within goal  · CTA PE study-no pulmonary embolism noted; mild background emphysematous changes and multifocal linear scarring/atelectasis changes; benign sclerosis of T12 vertebral body  · CTA head neck-no acute intracranial abnormality on non con CT head; dental disease with mixed sclerosis and lucency throughout axilla indicative of chronic maxillary infection  · Will check hemoglobin A1c and lipid panel  · Will start on atorvastatin 40 mg daily  · Continue with Coumadin  Echocardiogram is unremarkable        Discharging Physician / Practitioner: Karlos Chaidez MD  PCP: No primary care provider on file  Admission Date:   Admission Orders (From admission, onward)     Ordered        09/17/22 1036  Inpatient Admission  Once            09/16/22 1426  Place in Observation  Once                      Discharge Date: 09/18/22    Medical Problems             Resolved Problems  Date Reviewed: 9/18/2022          Resolved    Hypokalemia 9/18/2022     Resolved by  Karlos Chaidez MD                 Consultations During Hospital Stay:  · Neurology    Procedures Performed:   CT scan showing dental disease with mixed sclerosis and lucency throughout the maxilla which could indicate chronic maxillary infection  Follow-up with dentistry is advised  No other abnormalities on the CT scan  MRI showed no stroke  Echocardiogram was otherwise unremarkable  Ejection fraction was 50% with the normal diastolic dysfunction  Significant Findings / Test Results:   · None    Incidental Findings:   · See above     Test Results Pending at Discharge (will require follow up): · None     Outpatient Tests Requested:  · Outpatient pulmonary function test    Complications:  None    Reason for Admission:  Right-sided weakness and shortness of breath    Hospital Course:     Belkys Anderson is a 65 y o  male patient who originally presented to the hospital on 9/16/2022 due to right-sided weakness and shortness of breath    History presenting illness:Barak Watts is a 65 y o  male with a PMH of hypertension, history of DVT, previous CVA, and HIV who presents with shortness of breath and right-sided weakness  Patient went to his primary care provider today secondary to right-sided weakness and was sent to the emergency department  Patient states that this right-sided weakness started about 2 days ago and was not associated with any headache    Patient does have residual left-sided lower extremity weakness from prior strokes  Patient has been compliant with his Coumadin as an outpatient  Patient is poor historian and appears to have some component of medication noncompliance  Patient also complaining of shortness of breath on exertion and states he has been using his Advair more recently although this is a daily medication  Patient states he does not uses albuterol because it increases his anxiety  Again believe there is issue with medical understanding of diagnoses  Patient denied any other complaints on exam      Hospital course:  Patient was admitted for the reasons  The patient did have some right-sided weakness and some shortness of breath  The right-sided weakness is kind of residual from previous stroke  Nothing new  MRI was negative  Patient's INR was therapeutic so less chance of a stroke  The patient also had an echocardiogram which was unremarkable  In regards to the shortness of breath patient does have a history of smoking  I do recommend outpatient pulmonary follow-up  Patient is otherwise doing okay and stable for discharge        Please see above list of diagnoses and related plan for additional information  Condition at Discharge: good     Discharge Day Visit / Exam:     Subjective:  Patient seen examined    No acute events reported the patient is otherwise doing okay and states he feels little bit better today  Vitals: Blood Pressure: 131/88 (09/18/22 0713)  Pulse: 73 (09/18/22 0713)  Temperature: 98 2 °F (36 8 °C) (09/18/22 0713)  Temp Source: Oral (09/16/22 2200)  Respirations: 18 (09/18/22 0713)  Height: 5' 9" (175 3 cm) (09/18/22 0713)  Weight - Scale: 78 9 kg (174 lb) (09/18/22 0713)  SpO2: 97 % (09/18/22 0713)  Exam:   Physical Exam  (   General Appearance:    Alert, cooperative, no distress, appears stated age                               Lungs:     Clear to auscultation bilaterally, respirations unlabored       Heart:    Regular rate and rhythm, S1 and S2 normal, no murmur, rub    or gallop   Abdomen:     Soft, non-tender, bowel sounds active all four quadrants,     no masses, no organomegaly           Extremities:   Extremities normal, atraumatic, no cyanosis or edema     Discharge instructions/Information to patient and family:   See after visit summary for information provided to patient and family  Provisions for Follow-Up Care:  See after visit summary for information related to follow-up care and any pertinent home health orders  Disposition:     Home    For Discharges to [facility] SNF:   · Not Applicable to this Patient - Not Applicable to this Patient    Planned Readmission:  None anticipated     Discharge Statement:  I spent 35 minutes discharging the patient  This time was spent on the day of discharge  I had direct contact with the patient on the day of discharge  Greater than 50% of the total time was spent examining patient, answering all patient questions, arranging and discussing plan of care with patient as well as directly providing post-discharge instructions  Additional time then spent on discharge activities  Discharge Medications:  See after visit summary for reconciled discharge medications provided to patient and family        ** Please Note: This note has been constructed using a voice recognition system **

## 2022-09-18 NOTE — PLAN OF CARE
Problem: Potential for Falls  Goal: Patient will remain free of falls  Description: INTERVENTIONS:  - Educate patient/family on patient safety including physical limitations  - Instruct patient to call for assistance with activity   - Consult OT/PT to assist with strengthening/mobility   - Keep Call bell within reach  - Keep bed low and locked with side rails adjusted as appropriate  - Keep care items and personal belongings within reach  - Initiate and maintain comfort rounds  - Make Fall Risk Sign visible to staff  - Offer Toileting every 2Hours, in advance of need  - Initiate/Maintain alarm  - Obtain necessary fall risk management equipment:   - Apply yellow socks and bracelet for high fall risk patients  - Consider moving patient to room near nurses station  Outcome: Progressing     Problem: PAIN - ADULT  Goal: Verbalizes/displays adequate comfort level or baseline comfort level  Description: Interventions:  - Encourage patient to monitor pain and request assistance  - Assess pain using appropriate pain scale  - Administer analgesics based on type and severity of pain and evaluate response  - Implement non-pharmacological measures as appropriate and evaluate response  - Consider cultural and social influences on pain and pain management  - Notify physician/advanced practitioner if interventions unsuccessful or patient reports new pain  Outcome: Progressing     Problem: INFECTION - ADULT  Goal: Absence or prevention of progression during hospitalization  Description: INTERVENTIONS:  - Assess and monitor for signs and symptoms of infection  - Monitor lab/diagnostic results  - Monitor all insertion sites, i e  indwelling lines, tubes, and drains  - Monitor endotracheal if appropriate and nasal secretions for changes in amount and color  - [employer] appropriate cooling/warming therapies per order  - Administer medications as ordered  - Instruct and encourage patient and family to use good hand hygiene technique  - Identify and instruct in appropriate isolation precautions for identified infection/condition  Outcome: Progressing  Goal: Absence of fever/infection during neutropenic period  Description: INTERVENTIONS:  - Monitor WBC    Outcome: Progressing     Problem: SAFETY ADULT  Goal: Patient will remain free of falls  Description: INTERVENTIONS:  - Educate patient/family on patient safety including physical limitations  - Instruct patient to call for assistance with activity   - Consult OT/PT to assist with strengthening/mobility   - Keep Call bell within reach  - Keep bed low and locked with side rails adjusted as appropriate  - Keep care items and personal belongings within reach  - Initiate and maintain comfort rounds  - Make Fall Risk Sign visible to staff  - Offer Toileting every 2 Hours, in advance of need  - Initiate/Maintain alarm  - Obtain necessary fall risk management equipment:   - Apply yellow socks and bracelet for high fall risk patients  - Consider moving patient to room near nurses station  Outcome: Progressing  Goal: Maintain or return to baseline ADL function  Description: INTERVENTIONS:  -  Assess patient's ability to carry out ADLs; assess patient's baseline for ADL function and identify physical deficits which impact ability to perform ADLs (bathing, care of mouth/teeth, toileting, grooming, dressing, etc )  - Assess/evaluate cause of self-care deficits   - Assess range of motion  - Assess patient's mobility; develop plan if impaired  - Assess patient's need for assistive devices and provide as appropriate  - Encourage maximum independence but intervene and supervise when necessary  - Involve family in performance of ADLs  - Assess for home care needs following discharge   - Consider OT consult to assist with ADL evaluation and planning for discharge  - Provide patient education as appropriate  Outcome: Progressing  Goal: Maintains/Returns to pre admission functional level  Description: INTERVENTIONS:  - Perform BMAT or MOVE assessment daily    - Set and communicate daily mobility goal to care team and patient/family/caregiver  - Collaborate with rehabilitation services on mobility goals if consulted  - Perform Range of Motion 3 times a day  - Reposition patient every 3 hours  - Dangle patient 3 times a day  - Stand patient 3 times a day  - Ambulate patient 3 times a day  - Out of bed to chair 3 times a day   - Out of bed for meals 3 times a day  - Out of bed for toileting  - Record patient progress and toleration of activity level   Outcome: Progressing     Problem: DISCHARGE PLANNING  Goal: Discharge to home or other facility with appropriate resources  Description: INTERVENTIONS:  - Identify barriers to discharge w/patient and caregiver  - Arrange for needed discharge resources and transportation as appropriate  - Identify discharge learning needs (meds, wound care, etc )  - Arrange for interpretive services to assist at discharge as needed  - Refer to Case Management Department for coordinating discharge planning if the patient needs post-hospital services based on physician/advanced practitioner order or complex needs related to functional status, cognitive ability, or social support system  Outcome: Progressing     Problem: Knowledge Deficit  Goal: Patient/family/caregiver demonstrates understanding of disease process, treatment plan, medications, and discharge instructions  Description: Complete learning assessment and assess knowledge base    Interventions:  - Provide teaching at level of understanding  - Provide teaching via preferred learning methods  Outcome: Progressing

## 2022-09-19 NOTE — UTILIZATION REVIEW
Notification of Discharge   This is a Notification of Discharge from our facility [address]  Please be advised that this patient has been discharge from our facility  Below you will find the admission and discharge date and time including the patients disposition  UTILIZATION REVIEW CONTACT:  Claudell Ice Brendlinger  Utilization   Network Utilization Review Department  Phone: [phone] x carefully listen to the prompts  All voicemails are confidential   Email: [email or web address]     PHYSICIAN ADVISORY SERVICES:  FOR [id number] REVIEW - MEDICAL NECESSITY DENIAL  Phone: [phone]  Fax: [phone]  Email: [email or web address]     PRESENTATION DATE: 9/16/2022 10:39 AM  OBERVATION ADMISSION DATE: 09/16/2022  INPATIENT ADMISSION DATE: 9/17/22 10:36 AM   DISCHARGE DATE: 9/18/2022  4:15 PM  DISPOSITION: Home with [place] with Home Health Care      IMPORTANT INFORMATION:  Send all requests for admission clinical reviews, approved or denied determinations and any other requests to dedicated fax number below belonging to the campus where the patient is receiving treatment   List of dedicated fax numbers:  [address] DENIALS (Administrative/Medical Necessity) [phone]   [address] (Maternity/NICU/Pediatrics) [phone]   [facility] [phone]   [address] [place] [address] [phone]   [address] [place] [address] [phone]   [address] [place] [address],[address] Floor [address] [phone]   [facility]  [phone]   [address] [place] [address], [facility]  [address] [facility] [address] [facility] [phone]

## 2022-09-27 ENCOUNTER — TELEPHONE (OUTPATIENT)
Dept: NEUROLOGY | Facility: CLINIC | Age: 57
End: 2022-09-27

## 2022-09-27 NOTE — TELEPHONE ENCOUNTER
SLMO/RIGHT SIDED WEAKNESS          NOTES: Kj Francois will need follow up in in 3 months with neurovascular attending or advance practitioner  He will not require outpatient neurological testing  SCHEDULED: 1/19/23 @ 1PM W/DR Ramya Self  ADDED TO THE WAIT LIST  WILL SEND PATIENT A LETTER, AS HIS NUMBER IS NO LONGER IN SERVICE  THERE IS NOT A COMMUNICATION CONSENT FORM UPDATED ON FILE

## 2022-09-30 NOTE — TELEPHONE ENCOUNTER
Patient called in to schedule appt  Notified him that we already scheduled HFU and added appt to waitlist  Pt asked to update his contact information as he no longer lives in [place]  Pt lives in [place], [place]  Updated address and phone numbers to contact him on file       Sending appt information to new address per pt request

## 2023-01-17 ENCOUNTER — TELEPHONE (OUTPATIENT)
Dept: NEUROLOGY | Facility: CLINIC | Age: 58
End: 2023-01-17

## 2023-01-17 NOTE — TELEPHONE ENCOUNTER
[facility] to confirm patient's 1/19/2023 @ 1 pm appointment with Dr Linda Mueller at the [facility]  Call back number given [phone]

## 2023-02-22 ENCOUNTER — TELEPHONE (OUTPATIENT)
Dept: UROLOGY | Facility: AMBULATORY SURGERY CENTER | Age: 58
End: 2023-02-22

## 2023-02-22 NOTE — TELEPHONE ENCOUNTER
Called and spoke to patient  Patient currently in rehab  Patient tentatively scheduled for d/c Friday but trying to get it extended due to therapy  Patient has had catheter in for 2 weeks  Informed patient we cannot replace a referral to have catheter removed in rehab as he is a new patient  Patient stated someone told him to reach out to his PCP  Informed patient he can certainly reach out to PCP to see if they can provide orders for rehab to obtain a void trial  Informed patient to let us know the plan so he can then be scheduled with us  Patient verbalized understanding

## 2023-02-22 NOTE — TELEPHONE ENCOUNTER
Please Triage  New Patient    What is the reason for the patient’s appointment? Surgery was done about 02/08/23 for a hip replacement and there was placed and there was a catheter placed  He is still in rehab, [facility]  They stated that they need to have a referral to have it sent for it to be removed at the rehab  What office location does the patient prefer? [place]     Imaging/Lab Results:    Do we accept the patient's insurance or is the patient Self-Pay? Insurance Provider: [employer]   Plan Type/Number:  Member ID#: Has the patient had any previous Urologist(s)? No    Have patient records been requested? If not are records showing in Epic: no    Has the patient had any outside testing done? Does the patient have a personal history of cancer?  No    Pt can be reached at [phone]

## 2023-02-24 NOTE — TELEPHONE ENCOUNTER
Patient called stating his catheter was removed yesterday and he wants to know how to proceed now       Patient can be reached at [phone]

## 2023-02-24 NOTE — TELEPHONE ENCOUNTER
Called due to the upcoming weekend  Left VM for patient      When patient calls back please ask if his PCP was able to provide orders to facility for avalos removal/void trial

## 2023-02-27 ENCOUNTER — EVALUATION (OUTPATIENT)
Dept: PHYSICAL THERAPY | Facility: CLINIC | Age: 58
End: 2023-02-27

## 2023-02-27 DIAGNOSIS — M16.11 PRIMARY OSTEOARTHRITIS OF RIGHT HIP: Primary | ICD-10-CM

## 2023-02-27 DIAGNOSIS — Z96.641 STATUS POST RIGHT HIP REPLACEMENT: ICD-10-CM

## 2023-02-27 NOTE — PROGRESS NOTES
PT Evaluation     Today's date: 2023  Patient name: Ann Marie Condon  : 1965  MRN: [id number]  Referring provider: AGATHA Trejo*  Dx:   Encounter Diagnosis     ICD-10-CM    1  Primary osteoarthritis of right hip  M16 11       2  Status post right hip replacement  Z96 [id number]           Start Time: 1415  Stop Time: 1500  Total time in clinic (min): 45 minutes    Assessment  Assessment details: Pt is a 65 y o  male presenting to physical therapy with decreased ROM, decreased strength, and increased pain in his R hip, secondary to being s/p R anterior ORTIZ on 23  Pt's impairments are resulting in limitations with ambulation, prolonged standing, bending forward, household activities, and family care  Pt demonstrates L-sided weakness secondary to hx of CVAs but was ambulating without device, now using quad cane secondary to R hip ORTIZ  PT POC will focus on addressing impairments in order to improve ambulation and function  Pt is a good candidate for skilled PT to address impairments and facilitate return to prior level of function  Impairments: abnormal gait, abnormal or restricted ROM, activity intolerance, impaired balance, impaired physical strength, lacks appropriate home exercise program and pain with function  Functional limitations: ambulation, prolonged standing, bending forward, household activities, family care  Symptom irritability: lowUnderstanding of Dx/Px/POC: good   Prognosis: good    Goals  STG 4 - weeks  1  Patient will demonstrate increased R hip strength to 4+/5 in all planes to facilitate functional ability  2  Patient will demonstrate ability to ambulate 500' with good stability and least restrictive device to facilitate functional ability  LTG 8 - weeks  1  Patient will demonstrate decreased pain at worst with ambulation/stair negotiation to 2/10 or better to facilitate functional ability    2  Patient will demonstrate increased FOTO score from 62 at baseline to predicted score of 80 or higher to facilitate functional ability  Plan  Patient would benefit from: PT eval and skilled physical therapy  Planned modality interventions: cryotherapy, thermotherapy: hydrocollator packs and unattended electrical stimulation  Planned therapy interventions: balance, functional ROM exercises, flexibility, gait training, home exercise program, joint mobilization, manual therapy, massage, neuromuscular re-education, patient education, strengthening, stretching, therapeutic activities and therapeutic exercise  Frequency: 2x week  Duration in weeks: 8  Plan of Care beginning date: 2023  Plan of Care expiration date: 2023  Treatment plan discussed with: patient        Subjective Evaluation    History of Present Illness  Mechanism of injury: surgery  Mechanism of injury: Pt is s/p R anterior approach ORTIZ on 23  Pt reports prior hx of L ORTIZ which made his hips uneven and placed stress on his R hip  Pt now reports his hips feel even  Pt attended inpatient rehab and progressed well, now is at home and referred for outpatient PT  Pt reports chronic hx of CVAs affecting his L side, notes most recent one was in 2016 and continues to have weakness on L side of body  Pt using SBQC now in R hand since his R hip surgery  Pt reports his hip precautions are to not swing his leg behind him too fast, watching how he bends over, and not crossing his legs  Pt reports constant soreness in his R hip  Pt reports some difficulty with going up and down stairs  Pt reports he is able to walk about 1/4 mile before he needs to sit down  Pt reports some pain and difficulty with playing with his grandkids, hasn't been able to be as active since the surgery     Pain  Current pain ratin  At best pain ratin  At worst pain rating: 10  Location: Anterior and lateral R hip  Quality: dull ache  Relieving factors: relaxation  Aggravating factors: stair climbing, walking and sitting  Progression: improved    Patient Goals  Patient goals for therapy: decreased pain and increased strength  Patient goal: To walk without the cane and be able to do stairs normally         Objective     Active Range of Motion     Right Hip   Flexion: 85 degrees   Abduction: 10 degrees     Strength/Myotome Testing     Left Hip   Planes of Motion   Flexion: 3+    Right Hip   Planes of Motion   Flexion: 4-  Extension: 3-  Abduction: 3+  External rotation: 3+  Internal rotation: 3+    Left Knee   Flexion: 3+  Extension: 3+    Right Knee   Flexion: 4-  Extension: 4    Left Ankle/Foot   Dorsiflexion: 3  Plantar flexion: 3+    Right Ankle/Foot   Dorsiflexion: 4  Plantar flexion: 4    Ambulation     Observational Gait   Decreased walking speed and stride length  Additional Observational Gait Details  Ambulates with SBQC in RUE, swings RUE concurrently with RLE             Precautions: R hip anterior ORTIZ, prior hx L CVA      Manuals 2/27                                                                Neuro Re-Ed             Pt education about HEP, POC, precautions 8'            Bridges c bolster squeeze 1x10            Standing march to 90 2x10            Standing hip ext/abd 2x10 ea  SLRs NV                                                   Ther Ex             Supine clam 1x10 blue            Minisquats at bar 2x10            HR/TR 2x10 ea                                                                  Ther Activity                                       Gait Training                                       Modalities

## 2023-02-27 NOTE — LETTER
2023    Karina Hogan PA-C  [facility]    Patient: Rober January   YOB: 1965   Date of Visit: 2023     Encounter Diagnosis     ICD-10-CM    1  Primary osteoarthritis of right hip  M16 11       2  Status post right hip replacement  Z96 [id number]           Dear Dr Shayy Jeffrey: Thank you for your recent referral of Rober January  Please review the attached evaluation summary from [address], [facility] recent visit  Please verify that you agree with the plan of care by signing the attached order  If you have any questions or concerns, please do not hesitate to call  I sincerely appreciate the opportunity to share in the care of one of your patients and hope to have another opportunity to work with you in the near future  Sincerely,    Beverly Peters, PT      Referring Provider:      I certify that I have read the below Plan of Care and certify the need for these services furnished under this plan of treatment while under my care  Karina Hogan PA-C  [address] [place] [address]  Via Fax: [phone]          PT Evaluation     Today's date: 2023  Patient name: Rober January  : 1965  MRN: [id number]  Referring provider: AGATHA Minaya*  Dx:   Encounter Diagnosis     ICD-10-CM    1  Primary osteoarthritis of right hip  M16 11       2  Status post right hip replacement  Z96 [id number]           Start Time: 1415  Stop Time: 1500  Total time in clinic (min): 45 minutes    Assessment  Assessment details: Pt is a 65 y o  male presenting to physical therapy with decreased ROM, decreased strength, and increased pain in his R hip, secondary to being s/p R anterior ORTIZ on 23  Pt's impairments are resulting in limitations with ambulation, prolonged standing, bending forward, household activities, and family care   Pt demonstrates L-sided weakness secondary to hx of CVAs but was ambulating without device, now using quad cane secondary to R hip ORTIZ  PT POC will focus on addressing impairments in order to improve ambulation and function  Pt is a good candidate for skilled PT to address impairments and facilitate return to prior level of function  Impairments: abnormal gait, abnormal or restricted ROM, activity intolerance, impaired balance, impaired physical strength, lacks appropriate home exercise program and pain with function  Functional limitations: ambulation, prolonged standing, bending forward, household activities, family care  Symptom irritability: lowUnderstanding of Dx/Px/POC: good   Prognosis: good    Goals  STG 4 - weeks  1  Patient will demonstrate increased R hip strength to 4+/5 in all planes to facilitate functional ability  2  Patient will demonstrate ability to ambulate 500' with good stability and least restrictive device to facilitate functional ability  LTG 8 - weeks  1  Patient will demonstrate decreased pain at worst with ambulation/stair negotiation to 2/10 or better to facilitate functional ability  2  Patient will demonstrate increased FOTO score from 62 at baseline to predicted score of 80 or higher to facilitate functional ability      Plan  Patient would benefit from: PT eval and skilled physical therapy  Planned modality interventions: cryotherapy, thermotherapy: hydrocollator packs and unattended electrical stimulation  Planned therapy interventions: balance, functional ROM exercises, flexibility, gait training, home exercise program, joint mobilization, manual therapy, massage, neuromuscular re-education, patient education, strengthening, stretching, therapeutic activities and therapeutic exercise  Frequency: 2x week  Duration in weeks: 8  Plan of Care beginning date: 2/27/2023  Plan of Care expiration date: 4/24/2023  Treatment plan discussed with: patient        Subjective Evaluation    History of Present Illness  Mechanism of injury: surgery  Mechanism of injury: Pt is s/p R anterior approach ORTIZ on 23  Pt reports prior hx of L ORTIZ which made his hips uneven and placed stress on his R hip  Pt now reports his hips feel even  Pt attended inpatient rehab and progressed well, now is at home and referred for outpatient PT  Pt reports chronic hx of CVAs affecting his L side, notes most recent one was in 2016 and continues to have weakness on L side of body  Pt using SBQC now in R hand since his R hip surgery  Pt reports his hip precautions are to not swing his leg behind him too fast, watching how he bends over, and not crossing his legs  Pt reports constant soreness in his R hip  Pt reports some difficulty with going up and down stairs  Pt reports he is able to walk about 1/4 mile before he needs to sit down  Pt reports some pain and difficulty with playing with his grandkids, hasn't been able to be as active since the surgery  Pain  Current pain ratin  At best pain ratin  At worst pain rating: 10  Location: Anterior and lateral R hip  Quality: dull ache  Relieving factors: relaxation  Aggravating factors: stair climbing, walking and sitting  Progression: improved    Patient Goals  Patient goals for therapy: decreased pain and increased strength  Patient goal: To walk without the cane and be able to do stairs normally         Objective     Active Range of Motion     Right Hip   Flexion: 85 degrees   Abduction: 10 degrees     Strength/Myotome Testing     Left Hip   Planes of Motion   Flexion: 3+    Right Hip   Planes of Motion   Flexion: 4-  Extension: 3-  Abduction: 3+  External rotation: 3+  Internal rotation: 3+    Left Knee   Flexion: 3+  Extension: 3+    Right Knee   Flexion: 4-  Extension: 4    Left Ankle/Foot   Dorsiflexion: 3  Plantar flexion: 3+    Right Ankle/Foot   Dorsiflexion: 4  Plantar flexion: 4    Ambulation     Observational Gait   Decreased walking speed and stride length       Additional Observational Gait Details  Ambulates with SBQC in RUE, swings RUE concurrently with RLE            Precautions: R hip anterior ORTIZ, prior hx L CVA      Manuals 2/27                                                                Neuro Re-Ed             Pt education about HEP, POC, precautions 8'            Bridges c bolster squeeze 1x10            Standing march to 90 2x10            Standing hip ext/abd 2x10 ea  SLRs NV                                                   Ther Ex             Supine clam 1x10 blue            Minisquats at bar 2x10            HR/TR 2x10 ea                                                                  Ther Activity                                       Gait Training                                       Modalities

## 2023-02-28 NOTE — PROGRESS NOTES
3/2/2023      Chief Complaint   Patient presents with   • Follow-up     Assessment and Plan    65 y o  male new patient    1  Post-operative urinary retention  - Following hip replacement on 2/8/23  - Avalos catheter recently removed in rehab  - PVR today=31 mL     2  Prostate cancer screening/rising PSA  - Family history of prostate cancer in father  - KELSIE benign  - PSA from 12/8/22 was 3 06  Previously 2 10 in July 2022 and 1 22 in 2021  - Repeat PSA in 2 weeks  - If any progressive elevation, recommend mpMRI for further evaluation  Should he need MRI and this be positive would utilize this for MRI targeted prostate biopsy  - Will monitor and call with PSA results  History of Present Illness  Cydney Lundberg is a 65 y o  male here for new patient evaluation of urinary retention  He developed urinary retention requiring avalos catheter placement following total hip replacement on 2/8/23  Avalos catheter was recently removed in rehab a week or 2 ago  He denies any issues with voiding since Avalos was removed  No urinary complaints  Denies any prior history of urinary retention  No prior  surgical manipulation  Per chart review, patient's PSA has been seen to increase  Most recently was 3 06 in December 2022, prior PSAs as above  Does report family history of prostate cancer in his father  Review of Systems   Constitutional: Negative for chills and fever  Respiratory: Negative for shortness of breath  Cardiovascular: Negative for chest pain  Gastrointestinal: Negative for abdominal pain  Genitourinary: Negative for difficulty urinating, dysuria, flank pain, frequency, hematuria and urgency  Neurological: Negative for dizziness         Past Medical History  Past Medical History:   Diagnosis Date   • 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 [facility])    • Closed fracture of left hip, with malunion, subsequent encounter    • Erectile dysfunction    • Hypertension    • Obstructive sleep apnea, adult    • Stroke ([facility] [address] ) Past Social History  Past Surgical History:   Procedure Laterality Date   • HIP SURGERY     • TOTAL HIP ARTHROPLASTY Left      Social History     Tobacco Use   Smoking Status Some Days   • Types: Cigarettes   Smokeless Tobacco Never   Tobacco Comments    CURRENT EVERY DAY SMOKER: 2 CIGARETTES PER DAY (AS PER ALL SCRIPTS)       Past Family History  Family History   Problem Relation Age of Onset   • Diabetes Mother    • Hypertension Mother    • Cirrhosis Father         HEPATIC   • Prostate cancer Father    • Tuberculosis Other        Past Social history  Social History     Socioeconomic History   • Marital status: Single     Spouse name: Not on file   • Number of children: Not on file   • Years of education: Not on file   • Highest education level: Not on file   Occupational History   • Not on file   Tobacco Use   • Smoking status: Some Days     Types: Cigarettes   • Smokeless tobacco: Never   • Tobacco comments:     CURRENT EVERY DAY SMOKER: 2 CIGARETTES PER DAY (AS PER ALL SCRIPTS)   Substance and Sexual Activity   • Alcohol use: Not Currently   • Drug use: No     Comment: COCAINE USE (AS PER ALL SCRIPTS)   • Sexual activity: Not on file   Other Topics Concern   • Not on file   Social History Narrative   • Not on file     Social Determinants of Health     Financial Resource Strain: Not on file   Food Insecurity: No Food Insecurity   • Worried About Running Out of Food in the Last Year: Never true   • Ran Out of Food in the Last Year: Never true   Transportation Needs: No Transportation Needs   • Lack of Transportation (Medical): No   • Lack of Transportation (Non-Medical):  No   Physical Activity: Not on file   Stress: Not on file   Social Connections: Not on file   Intimate Partner Violence: Not on file   Housing Stability: Low Risk    • Unable to Pay for Housing in the Last Year: No   • Number of Places Lived in the Last Year: 1   • Unstable Housing in the Last Year: No       Current Medications  Current Outpatient Medications   Medication Sig Dispense Refill   • acetaminophen (TYLENOL) 325 mg tablet Take by mouth every 6 (six) hours     • albuterol (PROVENTIL HFA,VENTOLIN HFA) 90 mcg/act inhaler Inhale     • aspirin (ECOTRIN LOW STRENGTH) 81 mg EC tablet Take 81 mg by mouth daily     • atorvastatin (LIPITOR) 40 mg tablet Take 40 mg by mouth daily     • cyclobenzaprine (FLEXERIL) 10 mg tablet Take 1 tablet (10 mg total) by mouth 2 (two) times a day as needed (hip pain) 30 tablet 0   • Darunavir-Cobicistat 800-150 MG TABS Take 1 tablet by mouth daily     • diphenhydrAMINE (BENADRYL) 25 mg tablet Take 2 tablets (50 mg total) by mouth daily at bedtime 30 tablet 0   • emtricitabine-tenofovir AF (DESCOVY) 200-25 MG tablet Take 1 tablet by mouth daily     • ferrous sulfate 325 (65 Fe) mg tablet Take 325 mg by mouth daily with breakfast     • Fluticasone-Salmeterol (ADVAIR HFA IN) Inhale     • fluticasone-vilanterol (BREO ELLIPTA) 100-25 mcg/inh inhaler Inhale 1 puff daily Rinse mouth after use  60 blister 0   • gabapentin (NEURONTIN) 600 MG tablet Take 600 mg by mouth 3 (three) times a day     • hydrOXYzine HCL (ATARAX) 10 mg tablet Take 20 mg by mouth every 6 (six) hours as needed for itching     • montelukast (SINGULAIR) 10 mg tablet Take 10 mg by mouth daily at bedtime     • pantoprazole (PROTONIX) 40 mg tablet Take 40 mg by mouth daily     • tadalafil (CIALIS) 20 MG tablet Take 20 mg by mouth daily as needed for erectile dysfunction     • traZODone (DESYREL) 100 mg tablet Take 100 mg by mouth daily at bedtime     • warfarin (COUMADIN) 10 mg tablet Take 10 mg by mouth daily Mon, wed, sat     • warfarin (COUMADIN) 7 5 mg tablet Take 7 5 mg by mouth daily Tues, Thurs       No current facility-administered medications for this visit         Allergies  Allergies   Allergen Reactions   • Penicillins    • Food Rash     Annotation - [id number]: pineapples   • Tomato - Food Allergy Rash         The following portions of the patient's history were reviewed and updated as appropriate: allergies, current medications, past medical history, past social history, past surgical history and problem list       Vitals  Vitals:    03/02/23 1148   BP: 110/74   BP Location: Left arm   Patient Position: Sitting   Cuff Size: Adult   Weight: 78 9 kg (174 lb)   Height: 5' 9" (1 753 m)           Physical Exam  Physical Exam  Constitutional:       Appearance: Normal appearance  HENT:      Head: Normocephalic and atraumatic  Right Ear: External ear normal       Left Ear: External ear normal       Nose: Nose normal    Eyes:      General: No scleral icterus  Conjunctiva/sclera: Conjunctivae normal    Cardiovascular:      Pulses: Normal pulses  Pulmonary:      Effort: Pulmonary effort is normal    Genitourinary:     Comments: Slightly limited prostate exam due to tight rectal tone  No prostatic nodules or tenderness palpated  Musculoskeletal:         General: Normal range of motion  Cervical back: Normal range of motion  Skin:     General: Skin is warm and dry  Neurological:      General: No focal deficit present  Mental Status: He is alert and oriented to person, place, and time  Psychiatric:         Mood and Affect: Mood normal          Behavior: Behavior normal          Thought Content:  Thought content normal          Judgment: Judgment normal            Results  Recent Results (from the past 1 hour(s))   POCT Measure PVR    Collection Time: 03/02/23 11:54 AM   Result Value Ref Range    POST-VOID RESIDUAL VOLUME, ML POC 31 mL   ]  No results found for: PSA  Lab Results   Component Value Date    CALCIUM 9 1 09/18/2022    K 3 8 09/18/2022    CO2 27 09/18/2022     09/18/2022    BUN 12 09/18/2022    CREATININE 0 70 09/18/2022     Lab Results   Component Value Date    WBC 5 59 09/17/2022    HGB 10 9 (L) 09/17/2022    HCT 31 5 (L) 09/17/2022    MCV 92 09/17/2022     09/17/2022           Orders  Orders Placed This Encounter   Procedures   • POCT Measure PVR       Urrutia Stain

## 2023-03-02 ENCOUNTER — OFFICE VISIT (OUTPATIENT)
Dept: UROLOGY | Facility: CLINIC | Age: 58
End: 2023-03-02

## 2023-03-02 VITALS
DIASTOLIC BLOOD PRESSURE: 74 MMHG | HEIGHT: 69 IN | BODY MASS INDEX: 25.77 KG/M2 | WEIGHT: 174 LBS | SYSTOLIC BLOOD PRESSURE: 110 MMHG

## 2023-03-02 DIAGNOSIS — R33.8 POSTOPERATIVE URINARY RETENTION: Primary | ICD-10-CM

## 2023-03-02 DIAGNOSIS — R97.20 RISING PSA LEVEL: ICD-10-CM

## 2023-03-02 DIAGNOSIS — N99.89 POSTOPERATIVE URINARY RETENTION: Primary | ICD-10-CM

## 2023-03-02 LAB — POST-VOID RESIDUAL VOLUME, ML POC: 31 ML

## 2023-03-07 ENCOUNTER — APPOINTMENT (OUTPATIENT)
Dept: PHYSICAL THERAPY | Facility: CLINIC | Age: 58
End: 2023-03-07

## 2023-03-08 NOTE — PROGRESS NOTES
Daily Note     Today's date: 3/9/2023  Patient name: Porter Amaya  : 1965  MRN: [id number]  Referring provider: AGATHA Hernandez*  Dx:   Encounter Diagnosis     ICD-10-CM    1  Primary osteoarthritis of right hip  M16 11       2  Status post right hip replacement  Z96 641           Start Time: 930  Stop Time: 1011  Total time in clinic (min): 41 minutes    Subjective: Pt reports his R hip is a little sore today  Objective: See treatment diary below      Assessment: Tolerated treatment well  Patient demonstrated fatigue post treatment, exhibited good technique with therapeutic exercises and would benefit from continued PT  Pt program progressed today based on improvements since initial evaluation  Initiated Nustep for strengthening and endurance training of BLEs  Pt tolerated progressions well without any discomfort in his R hip, though pt did note some discomfort in his L knee  Pt notes that he damaged his L knee a while ago and was told he may require surgery on it after he heals from his R hip surgery  Will grade exercises appropriately to avoid excessive discomfort in L knee  Plan: Continue per plan of care  Progress treatment as tolerated  Precautions: R hip anterior ORTIZ , prior hx L CVA      Manuals 2/27 3/9           PROM R hip  6' within hip precautions                                                  Neuro Re-Ed             Pt education about HEP 8' 4'           Bridges c bolster squeeze 1x10 2x10           Standing march to  2x10 2x10           Standing hip ext/abd 2x10 ea  2x10 ea  SLRs NV 2x10 slight assistance                                                  Ther Ex             Supine clam 1x10 blue 2x10 blue           Minisquats at bar 2x10 2x10           HR/TR 2x10 ea  2x10 ea             S/L hip abd  2x10           Nustep  7'                                                  Ther Activity             Sit to stands c airex  2x10           Stair ascent/descent  Step over step x3 c B railing           Gait Training                                       Modalities

## 2023-03-09 ENCOUNTER — OFFICE VISIT (OUTPATIENT)
Dept: PHYSICAL THERAPY | Facility: CLINIC | Age: 58
End: 2023-03-09

## 2023-03-09 DIAGNOSIS — M16.11 PRIMARY OSTEOARTHRITIS OF RIGHT HIP: Primary | ICD-10-CM

## 2023-03-09 DIAGNOSIS — Z96.641 STATUS POST RIGHT HIP REPLACEMENT: ICD-10-CM

## 2023-03-12 NOTE — PROGRESS NOTES
Daily Note     Today's date: 3/12/2023  Patient name: Rosie Mchugh  : 1965  MRN: [id number]  Referring provider: AGATHA Pollard*  Dx:   Encounter Diagnosis     ICD-10-CM    1  Primary osteoarthritis of right hip  M16 11       2  Status post right hip replacement  Z96 641                      Subjective: ***      Objective: See treatment diary below      Assessment: Tolerated treatment {Tolerated treatment :6043997918}  Patient {assessment:}      Plan: {PLAN:0461242656}     Precautions: R hip anterior ORTIZ , prior hx L CVA      Manuals 2/27 3/9 3/13          PROM R hip  6' within hip precautions                                                  Neuro Re-Ed             Pt education about HEP 8' 4'           Bridges c bolster squeeze 1x10 2x10           Standing march to  2x10 2x10           Standing hip ext/abd 2x10 ea  2x10 ea  SLRs NV 2x10 slight assistance                                                  Ther Ex             Supine clam 1x10 blue 2x10 blue           Minisquats at bar 2x10 2x10           HR/TR 2x10 ea  2x10 ea             S/L hip abd  2x10           Nustep  7'                                                  Ther Activity             Sit to stands c airex  2x10           Stair ascent/descent  Step over step x3 c B railing           Gait Training                                       Modalities

## 2023-03-13 ENCOUNTER — APPOINTMENT (OUTPATIENT)
Dept: PHYSICAL THERAPY | Facility: CLINIC | Age: 58
End: 2023-03-13

## 2023-03-13 DIAGNOSIS — Z96.641 STATUS POST RIGHT HIP REPLACEMENT: ICD-10-CM

## 2023-03-13 DIAGNOSIS — M16.11 PRIMARY OSTEOARTHRITIS OF RIGHT HIP: Primary | ICD-10-CM

## 2023-03-16 ENCOUNTER — OFFICE VISIT (OUTPATIENT)
Dept: PHYSICAL THERAPY | Facility: CLINIC | Age: 58
End: 2023-03-16

## 2023-03-16 DIAGNOSIS — M16.11 PRIMARY OSTEOARTHRITIS OF RIGHT HIP: Primary | ICD-10-CM

## 2023-03-16 DIAGNOSIS — Z96.641 STATUS POST RIGHT HIP REPLACEMENT: ICD-10-CM

## 2023-03-16 NOTE — PROGRESS NOTES
Daily Note     Today's date: 3/16/2023  Patient name: Cydney Lundberg  : 1965  MRN: [id number]  Referring provider: AGATHA Carvalho*  Dx:   Encounter Diagnosis     ICD-10-CM    1  Primary osteoarthritis of right hip  M16 11       2  Status post right hip replacement  Z96 641           Start Time: 930  Stop Time: 1015  Total time in clinic (min): 45 minutes    Subjective: Pt reports his R hip is feeling better this week, notes the soreness in his R hip is mostly gone and he has been able to sleep on his R side this week  Objective: See treatment diary below      Assessment: Tolerated treatment well  Patient demonstrated fatigue post treatment, exhibited good technique with therapeutic exercises and would benefit from continued PT  Pt demonstrates improved R hip ROM and improved tolerance with exercises this session  Pt program progressed with additional strengthening and stabilization exercises of RLE, as well as increased repetitions for previously completed exercises  Pt completed flexion straight leg raises this session without any manual assistance, demonstrated slight difficulty but able to complete sets of 5  Pt demonstrated no R hip pain with performance of exercises  Plan: Continue per plan of care  Progress treatment as tolerated  Precautions: R hip anterior ORTIZ , prior hx L CVA      Manuals 2/27 3/9 3/16           PROM R hip  6' within hip precautions 6' within hip precautions                                                 Neuro Re-Ed             Pt education about HEP 8' 4' 2'          Bridges c bolster squeeze 1x10 2x10 3x10          Standing march to  2x10 2x10 3x10          Standing hip ext/abd 2x10 ea  2x10 ea  3x10 ea  SLRs NV 2x10 slight assistance 4x5 no assistance                                                 Ther Ex             Supine clam 1x10 blue 2x10 blue 2x10 ea  blue S/L          Minisquats at bar 2x10 2x10           HR/TR 2x10 ea  2x10 ea  3x10 ea            S/L hip abd  2x10 2x10          Nustep  7' 7'                                                 Ther Activity             Sit to stands c airex  2x10 2x10          Stair ascent/descent  Step over step x3 c B railing Step over step x5 c B railing          SL leg press   3x10 55# increase         Gait Training                                       Modalities

## 2023-03-16 NOTE — PROGRESS NOTES
Daily Note     Today's date: 3/16/2023  Patient name: Barron Bell  : 1965  MRN: [id number]  Referring provider: AGATHA Lee*  Dx:   Encounter Diagnosis     ICD-10-CM    1  Primary osteoarthritis of right hip  M16 11       2  Status post right hip replacement  Z96 641                      Subjective: ***      Objective: See treatment diary below      Assessment: Tolerated treatment {Tolerated treatment :6893016318}  Patient {assessment:}      Plan: {PLAN:8672221408}     Precautions: R hip anterior ORTIZ , prior hx L CVA      Manuals 2/27 3/9 3/13          PROM R hip  6' within hip precautions                                                  Neuro Re-Ed             Pt education about HEP 8' 4'           Bridges c bolster squeeze 1x10 2x10           Standing march to  2x10 2x10           Standing hip ext/abd 2x10 ea  2x10 ea  SLRs NV 2x10 slight assistance                                                  Ther Ex             Supine clam 1x10 blue 2x10 blue           Minisquats at bar 2x10 2x10           HR/TR 2x10 ea  2x10 ea             S/L hip abd  2x10           Nustep  7'                                                  Ther Activity             Sit to stands c airex  2x10           Stair ascent/descent  Step over step x3 c B railing           Gait Training                                       Modalities

## 2023-03-20 ENCOUNTER — OFFICE VISIT (OUTPATIENT)
Dept: PHYSICAL THERAPY | Facility: CLINIC | Age: 58
End: 2023-03-20

## 2023-03-20 DIAGNOSIS — Z96.641 STATUS POST RIGHT HIP REPLACEMENT: ICD-10-CM

## 2023-03-20 DIAGNOSIS — M16.11 PRIMARY OSTEOARTHRITIS OF RIGHT HIP: Primary | ICD-10-CM

## 2023-03-20 NOTE — PROGRESS NOTES
Daily Note     Today's date: 3/20/2023  Patient name: Evelyn Hernandez  : 1965  MRN: [id number]  Referring provider: AGATHA Arizmendi*  Dx:   Encounter Diagnosis     ICD-10-CM    1  Primary osteoarthritis of right hip  M16 11       2  Status post right hip replacement  Z96 641           Start Time: 930  Stop Time: 1015  Total time in clinic (min): 45 minutes    Subjective: Pt reports his R hip feels good, no issues over the weekend, however, his L knee is sore  Objective: See treatment diary below      Assessment: Tolerated treatment well  Patient demonstrated fatigue post treatment, exhibited good technique with therapeutic exercises and would benefit from continued PT  Initiated standing SLRs with light resistance and increased repetitions of supine of SLRs to continue to target hip flexors  Pt demonstrating good progression with strength in all other planes of hip, still has some difficulty with supine straight leg flexion  Pt cued for technique to keep R knee extended during exercise  Pt tolerated progressions well with no soreness in his R hip at end of session  Plan: Continue per plan of care  Progress treatment as tolerated  Precautions: R hip anterior ORTIZ , prior hx L CVA      Manuals 2/27 3/9 3/16 3/20          PROM R hip  6' within hip precautions 6' within hip precautions 8' within hip precautions                                                Neuro Re-Ed             Pt education about HEP 8' 4' 2'          Bridges c bolster squeeze 1x10 2x10 3x10 3x10         Standing march to  2x10 2x10 3x10 3x10         Standing SLR    3x10 orange         Standing hip ext/abd 2x10 ea  2x10 ea  3x10 ea  3x10 ea  Add TB        SLRs NV 2x10 slight assistance 4x5 no assistance 6x5                                                Ther Ex             S/L clamshell 1x10 blue 2x10 blue 2x10 ea  blue S/L 3x10 blue         Minisquats at bar 2x10 2x10           HR/TR 2x10 ea  2x10 ea  3x10 ea  3x10 ea           S/L hip abd  2x10 2x10 3x10         Nustep  7' 7' 7'                                                Ther Activity             Sit to stands c airex  2x10 2x10 2x10         Stair ascent/descent  Step over step x3 c B railing Step over step x5 c B railing NP         SL leg press   3x10 55# 3x10 75#         Gait Training                                       Modalities

## 2023-03-23 ENCOUNTER — APPOINTMENT (OUTPATIENT)
Dept: LAB | Facility: CLINIC | Age: 58
End: 2023-03-23

## 2023-03-23 ENCOUNTER — OFFICE VISIT (OUTPATIENT)
Dept: PHYSICAL THERAPY | Facility: CLINIC | Age: 58
End: 2023-03-23

## 2023-03-23 DIAGNOSIS — M16.11 PRIMARY OSTEOARTHRITIS OF RIGHT HIP: Primary | ICD-10-CM

## 2023-03-23 DIAGNOSIS — Z96.641 STATUS POST RIGHT HIP REPLACEMENT: ICD-10-CM

## 2023-03-23 DIAGNOSIS — R97.20 RISING PSA LEVEL: ICD-10-CM

## 2023-03-23 LAB — PSA SERPL-MCNC: 2.6 NG/ML (ref 0–4)

## 2023-03-23 NOTE — PROGRESS NOTES
Daily Note     Today's date: 3/23/2023  Patient name: Kat Phillips  : 1965  MRN: [id number]  Referring provider: AGATHA Awan*  Dx:   Encounter Diagnosis     ICD-10-CM    1  Primary osteoarthritis of right hip  M16 11       2  Status post right hip replacement  Z96 641                      Subjective: Patient offers no new complaints, states he has been getting around stairs ok at home  Objective: See treatment diary below      Assessment: Tolerated treatment well  Difficulty maintaining full knee ext with SLR requiring verbal and tactile cues with little carryover  Hip flexor compensation noted with SL abd due to weakness  Patient does well on stairs, able to perform reciprocally with cues but relies heavily on dual hand rails for support  He is challenged with outlined resistance with TB exercises both on table and standing, utilizing some momentum for better contraction  Patient demonstrated fatigue post treatment and would benefit from continued PT      Plan: Progress treatment as tolerated  Precautions: R hip anterior ORTIZ , prior hx L CVA      Manuals 2/27 3/9 3/16 3/20  3/23        PROM R hip  6' within hip precautions 6' within hip precautions 8' within hip precautions 8' within hip precautions                                               Neuro Re-Ed             Pt education about HEP 8' 4' 2'          Bridges c bolster squeeze 1x10 2x10 3x10 3x10 3x10         Standing march to  2x10 2x10 3x10 3x10 3x10        Standing SLR    3x10 orange 3x10 OTB        Standing hip ext/abd 2x10 ea  2x10 ea  3x10 ea  3x10 ea  3x10 ea OTB        SLRs NV 2x10 slight assistance 4x5 no assistance 6x5 4x6                                               Ther Ex             S/L clamshell 1x10 blue 2x10 blue 2x10 ea  blue S/L 3x10 blue 3x10 ea OTB        Minisquats at bar 2x10 2x10           HR/TR 2x10 ea  2x10 ea  3x10 ea  3x10 ea   3x10 ea        S/L hip abd  2x10 2x10 3x10 3x10        Nustep  7' 7' 7' 7'                                               Ther Activity             Sit to stands c airex  2x10 2x10 2x10 2x10         Stair ascent/descent  Step over step x3 c B railing Step over step x5 c B railing NP Step over step x5 c B railing        SL leg press   3x10 55# 3x10 75# 3x10 75#        Gait Training                                       Modalities

## 2023-03-24 ENCOUNTER — TELEPHONE (OUTPATIENT)
Dept: OBGYN CLINIC | Facility: CLINIC | Age: 58
End: 2023-03-24

## 2023-03-24 ENCOUNTER — TELEPHONE (OUTPATIENT)
Dept: UROLOGY | Facility: CLINIC | Age: 58
End: 2023-03-24

## 2023-03-24 NOTE — TELEPHONE ENCOUNTER
Pt returning missed call  Informed pt of result and recommendations  No further questions at this time

## 2023-03-24 NOTE — TELEPHONE ENCOUNTER
----- Message from Dax Reyes PA-C sent at 3/24/2023  7:40 AM EDT -----  PSA back down to 2 6, recommend continued yearly prostate cancer screening

## 2023-03-24 NOTE — TELEPHONE ENCOUNTER
Call to this patient no answer unable to leave a message on either number as [phone] just keeps ringing and no one answers, and does not go to voice mail  The [phone] went to voice mail however the mail box is full and can not accept any further voice mails    If this patient call back we need for him to bring a disc with his xrays and MRI that were done at [facility] to his appointment with Dr Sonia Ortiz on 03/27/2023

## 2023-03-27 ENCOUNTER — OFFICE VISIT (OUTPATIENT)
Dept: PHYSICAL THERAPY | Facility: CLINIC | Age: 58
End: 2023-03-27

## 2023-03-27 DIAGNOSIS — Z96.641 STATUS POST RIGHT HIP REPLACEMENT: ICD-10-CM

## 2023-03-27 DIAGNOSIS — M16.11 PRIMARY OSTEOARTHRITIS OF RIGHT HIP: Primary | ICD-10-CM

## 2023-03-27 NOTE — PROGRESS NOTES
m Daily Note     Today's date: 3/27/2023  Patient name: Christopher Nick  : 1965  MRN: [id number]  Referring provider: AGATHA Bradford*  Dx:   Encounter Diagnosis     ICD-10-CM    1  Primary osteoarthritis of right hip  M16 11       2  Status post right hip replacement  Z96 641                      Subjective: Patient offers no new complaints about his hip, states his L knee hurts  Objective: See treatment diary below      Assessment: Tolerated treatment well  ROM progressing well  Added foam roll as positioning cue to decrease hip flexor compensation with SL abd  Extensive cues with little carryover to maintain knee ext during supine SLR  Patient demonstrated fatigue post treatment and would benefit from continued PT      Plan: Progress treatment as tolerated  Precautions: R hip anterior ORTIZ , prior hx L CVA      Manuals 2/27 3/9 3/16 3/20  3/23 3/27       PROM R hip  6' within hip precautions 6' within hip precautions 8' within hip precautions 8' within hip precautions 8' within hip precautions                                              Neuro Re-Ed             Pt education about HEP 8' 4' 2'          Bridges c bolster squeeze 1x10 2x10 3x10 3x10 3x10  3x10        Standing march to  2x10 2x10 3x10 3x10 3x10 3x10        Standing SLR    3x10 orange 3x10 OTB 3x10 OTB       Standing hip ext/abd 2x10 ea  2x10 ea  3x10 ea  3x10 ea  3x10 ea OTB 3x10 ea OTB       SLRs NV 2x10 slight assistance 4x5 no assistance 6x5 4x6 4x6        LAQ      NV                                 Ther Ex             S/L clamshell 1x10 blue 2x10 blue 2x10 ea  blue S/L 3x10 blue 3x10 ea OTB 3x10 ea OTB       Minisquats at bar 2x10 2x10           HR/TR 2x10 ea  2x10 ea  3x10 ea  3x10 ea   3x10 ea 3x10 ea        S/L hip abd  2x10 2x10 3x10 3x10 3x10  W/ foam roll cue       Nustep  7' 7' 7' 7' 7'                                              Ther Activity             Sit to stands c airex  2x10 2x10 2x10 2x10  2x10 Stair ascent/descent  Step over step x3 c B railing Step over step x5 c B railing NP Step over step x5 c B railing Step over step c B railing x5       SL leg press   3x10 55# 3x10 75# 3x10 75# 3x10 75#       Gait Training                                       Modalities

## 2023-03-30 ENCOUNTER — OFFICE VISIT (OUTPATIENT)
Dept: PHYSICAL THERAPY | Facility: CLINIC | Age: 58
End: 2023-03-30

## 2023-03-30 DIAGNOSIS — Z96.641 STATUS POST RIGHT HIP REPLACEMENT: Primary | ICD-10-CM

## 2023-03-30 DIAGNOSIS — M16.11 PRIMARY OSTEOARTHRITIS OF RIGHT HIP: ICD-10-CM

## 2023-03-30 NOTE — PROGRESS NOTES
Daily Note     Today's date: 3/30/2023  Patient name: Yessi Tirado  : 1965  MRN: [id number]  Referring provider: AGATHA Gauthier*  Dx:   Encounter Diagnosis     ICD-10-CM    1  Status post right hip replacement  Z96 641       2  Primary osteoarthritis of right hip  M16 11                      Subjective: Pt reports that he is stiff today  Objective: See treatment diary below      Assessment: Pt continued to have difficulty with hip flexor and abductor strengthening and continued to require use of the bolster to inhibit hip flexion during S/L hip abduction  He additionally demonstrated difficulty with eccentric control during descending of the steps as shown by rotation at the hips do decrease anterior tibial translation upon descent  Continue to progress functional strengthening as able  Plan: Continue per plan of care  Progress treatment as tolerated  Precautions: R hip anterior ORTIZ , prior hx L CVA      Manuals 2/27 3/9 3/16 3/20  3/23 3/27 3/30      PROM R hip  6' within hip precautions 6' within hip precautions 8' within hip precautions 8' within hip precautions 8' within hip precautions 8' within hip precautions                                             Neuro Re-Ed             Pt education about HEP 8' 4' 2'          Bridges c bolster squeeze 1x10 2x10 3x10 3x10 3x10  3x10  3x10      Standing march to  2x10 2x10 3x10 3x10 3x10 3x10  3x10 on foam       Standing SLR    3x10 orange 3x10 OTB 3x10 OTB 3x10 OTB      Standing hip ext/abd 2x10 ea  2x10 ea  3x10 ea  3x10 ea  3x10 ea OTB 3x10 ea OTB 3x10 ea OTB      SLRs NV 2x10 slight assistance 4x5 no assistance 6x5 4x6 4x6  4x6       LAQ      NV                                 Ther Ex             S/L clamshell 1x10 blue 2x10 blue 2x10 ea  blue S/L 3x10 blue 3x10 ea OTB 3x10 ea OTB 3x10 ea OTB      Minisquats at bar 2x10 2x10           HR/TR 2x10 ea  2x10 ea  3x10 ea  3x10 ea   3x10 ea 3x10 ea  3x10 ea       S/L hip abd  2x10 2x10 3x10 3x10 3x10  W/ foam roll cue 3x10  W/ foam roll cue      Nustep  7' 7' 7' 7' 7' 7'                                             Ther Activity             Sit to stands c airex  2x10 2x10 2x10 2x10  2x10  2x10       Stair ascent/descent  Step over step x3 c B railing Step over step x5 c B railing NP Step over step x5 c B railing Step over step c B railing x5 Step over step c B railing x5      SL leg press   3x10 55# 3x10 75# 3x10 75# 3x10 75# 3x10 75#      Gait Training                                       Modalities

## 2023-04-03 ENCOUNTER — OFFICE VISIT (OUTPATIENT)
Dept: PHYSICAL THERAPY | Facility: CLINIC | Age: 58
End: 2023-04-03

## 2023-04-03 DIAGNOSIS — M16.11 PRIMARY OSTEOARTHRITIS OF RIGHT HIP: ICD-10-CM

## 2023-04-03 DIAGNOSIS — Z96.641 STATUS POST RIGHT HIP REPLACEMENT: Primary | ICD-10-CM

## 2023-04-03 NOTE — PROGRESS NOTES
Daily Note     Today's date: 4/3/2023  Patient name: Tylor Mccarthy  : 1965  MRN: [id number]  Referring provider: AGATHA Saini*  Dx:   Encounter Diagnosis     ICD-10-CM    1  Status post right hip replacement  Z96 641       2  Primary osteoarthritis of right hip  M16 11           Start Time: 848  Stop Time: 933  Total time in clinic (min): 45 minutes    Subjective: Pt reports just a little soreness in his L knee today  Objective: See treatment diary below      Assessment: Tolerated treatment well  Patient demonstrated fatigue post treatment, exhibited good technique with therapeutic exercises and would benefit from continued PT  Pt demonstrated improved technique with SLRs, able to keep knee straight without cueing until fatigue set in towards end of each set  Cueing proved for posterior rotation during S/L hip abduction to prevent compensation  Initiated resisted sidestepping at bar to further improve R hip stability and strength, pt tolerated well without discomfort and demonstrated some muscle fatigue at end of session  Plan: Continue per plan of care  Progress treatment as tolerated  Precautions: R hip anterior ORTIZ , prior hx L CVA      Manuals 2/27 3/9 3/16 3/20  3/23 3/27 3/30 4/3     PROM R hip  6' within hip precautions 6' within hip precautions 8' within hip precautions 8' within hip precautions 8' within hip precautions 8' within hip precautions 8' within hip precautions                                            Neuro Re-Ed             Pt education about HEP 8' 4' 2'          Bridges c bolster squeeze 1x10 2x10 3x10 3x10 3x10  3x10  3x10 3x10     Standing march to  2x10 2x10 3x10 3x10 3x10 3x10  3x10 on foam  3x10 on foam      Standing SLR    3x10 orange 3x10 OTB 3x10 OTB 3x10 OTB 3x10 OTB     Standing hip ext/abd 2x10 ea  2x10 ea  3x10 ea  3x10 ea   3x10 ea OTB 3x10 ea OTB 3x10 ea OTB 3x10 ea OTB     SLRs NV 2x10 slight assistance 4x5 no assistance 6x5 4x6 4x6 4x6  3x10     LAQ      NV       Sidestepping at bar        2 laps at mirror OTB                  Ther Ex             S/L clamshell 1x10 blue 2x10 blue 2x10 ea  blue S/L 3x10 blue 3x10 ea OTB 3x10 ea OTB 3x10 ea OTB 3x10 blue     Minisquats at bar 2x10 2x10           HR/TR 2x10 ea  2x10 ea  3x10 ea  3x10 ea   3x10 ea 3x10 ea  3x10 ea  3x10 ea      S/L hip abd  2x10 2x10 3x10 3x10 3x10  W/ foam roll cue 3x10  W/ foam roll cue 3x10     Nustep  7' 7' 7' 7' 7' 7' 7'                                            Ther Activity             Sit to stands c airex  2x10 2x10 2x10 2x10  2x10  2x10  2x10     Stair ascent/descent  Step over step x3 c B railing Step over step x5 c B railing NP Step over step x5 c B railing Step over step c B railing x5 Step over step c B railing x5 Step over step c B railing x5     SL leg press   3x10 55# 3x10 75# 3x10 75# 3x10 75# 3x10 75# 3x10 85#     Gait Training                                       Modalities

## 2023-04-06 ENCOUNTER — OFFICE VISIT (OUTPATIENT)
Dept: PHYSICAL THERAPY | Facility: CLINIC | Age: 58
End: 2023-04-06

## 2023-04-06 DIAGNOSIS — Z96.641 STATUS POST RIGHT HIP REPLACEMENT: Primary | ICD-10-CM

## 2023-04-06 DIAGNOSIS — M16.11 PRIMARY OSTEOARTHRITIS OF RIGHT HIP: ICD-10-CM

## 2023-04-06 NOTE — PROGRESS NOTES
Daily Note     Today's date: 2023  Patient name: Stephani Campos  : 1965  MRN: [id number]  Referring provider: AGATHA Cervantes*  Dx:   Encounter Diagnosis     ICD-10-CM    1  Status post right hip replacement  Z96 641       2  Primary osteoarthritis of right hip  M16 11           Start Time: 930  Stop Time: 1013  Total time in clinic (min): 43 minutes    Subjective: Pt reports slight R hip soreness after previous session, but feels better today  Objective: See treatment diary below      Assessment: Tolerated treatment well  Patient demonstrated fatigue post treatment, exhibited good technique with therapeutic exercises and would benefit from continued PT  Pt able to progress with inclusion of reverse clamshells for additional targeting of glute medius/minimus  Pt also able to increase repetitions for sit to stands with airex, pt noted slight discomfort in his L knee but tolerated well without pain in his R hip  Manual and verbal cueing provided for technique for supine and sidelying straight leg raises and pt demonstrated increased fatigue with proper technique  Plan: Continue per plan of care  Progress treatment as tolerated  Precautions: R hip anterior ORTIZ , prior hx L CVA      Manuals 2/27 3/9 3/16 3/20  3/23 3/27 3/30 4/3 4/6    PROM R hip  6' within hip precautions 6' within hip precautions 8' within hip precautions 8' within hip precautions 8' within hip precautions 8' within hip precautions 8' within hip precautions 8' within hip precautions                                           Neuro Re-Ed             Pt education about HEP 8' 4' 2'          Bridges c bolster squeeze 1x10 2x10 3x10 3x10 3x10  3x10  3x10 3x10 3x10    Standing  2x10 2x10 3x10 3x10 3x10 3x10  3x10 on foam  3x10 on foam  NV    Standing SLR    3x10 orange 3x10 OTB 3x10 OTB 3x10 OTB 3x10 OTB 3x10 OTB    Standing hip ext/abd 2x10 ea  2x10 ea  3x10 ea  3x10 ea   3x10 ea OTB 3x10 ea OTB 3x10 ea OTB 3x10 ea OTB 3x10 ea OTB    SLRs NV 2x10 slight assistance 4x5 no assistance 6x5 4x6 4x6  4x6  3x10 3x10    Sidestepping at bar        2 laps at mirror OTB 2 laps at mirror OTB                 Ther Ex             Clamshell 1x10 blue 2x10 blue 2x10 ea  blue S/L 3x10 blue 3x10 ea OTB 3x10 ea OTB 3x10 ea OTB 3x10 blue 3x10 blue    Reverse clamshell         3x10 peach    HR/TR 2x10 ea  2x10 ea  3x10 ea  3x10 ea   3x10 ea 3x10 ea  3x10 ea  3x10 ea  3x10 ea     S/L hip abd  2x10 2x10 3x10 3x10 3x10  W/ foam roll cue 3x10  W/ foam roll cue 3x10 3x10    Nustep  7' 7' 7' 7' 7' 7' 7' 8'                                           Ther Activity             Sit to stands c airex  2x10 2x10 2x10 2x10  2x10  2x10  2x10 3x10    Stair ascent/descent  Step over step x3 c B railing Step over step x5 c B railing NP Step over step x5 c B railing Step over step c B railing x5 Step over step c B railing x5 Step over step c B railing x5 Step over step c B railing x5    SL leg press   3x10 55# 3x10 75# 3x10 75# 3x10 75# 3x10 75# 3x10 85# 3x10 85#    Gait Training                                       Modalities

## 2023-04-13 ENCOUNTER — APPOINTMENT (OUTPATIENT)
Dept: PHYSICAL THERAPY | Facility: CLINIC | Age: 58
End: 2023-04-13

## 2023-04-24 ENCOUNTER — OFFICE VISIT (OUTPATIENT)
Dept: PHYSICAL THERAPY | Facility: CLINIC | Age: 58
End: 2023-04-24

## 2023-04-24 DIAGNOSIS — M16.11 PRIMARY OSTEOARTHRITIS OF RIGHT HIP: ICD-10-CM

## 2023-04-24 DIAGNOSIS — Z96.641 STATUS POST RIGHT HIP REPLACEMENT: Primary | ICD-10-CM

## 2023-04-24 NOTE — PROGRESS NOTES
Daily Note     Today's date: 2023  Patient name: Tylor Mccarthy  : 1965  MRN: [id number]  Referring provider: AGATHA Saini*  Dx:   Encounter Diagnosis     ICD-10-CM    1  Status post right hip replacement  Z96 641       2  Primary osteoarthritis of right hip  M16 11                      Subjective: Patient reports his his is doing well  He cont to have to utilize a step to step pattern on steps with AD and one handrail  He is able to perform reciprocal pattern with dual hand rails  Objective: See treatment diary below      Assessment: Tolerated treatment well  Due to patients L sided weakness, he feels more comfortable leading with R LE both ascending and descending  Cues to maintain knee ext during supine SLR with minimal carryover after first rep or so  Cont to exhibit hip flexor compensation with SL abd but is requiring less cues to reduce  Cues to increase ROM with clamshells  He remains challenged with outlined reps and resistance but is making small improvements in form overall  Patient demonstrated fatigue post treatment and would benefit from continued PT      Plan: Progress treatment as tolerated         Precautions: R hip anterior ORTIZ , prior hx L CVA      Manuals 4/17 4/20 4/24  3/23 3/27 3/30 4/3 4/6 4/10   PROM R hip 8' within hip precautions 8' within hip precautions 8' within hip precautions  8' within hip precautions 8' within hip precautions 8' within hip precautions 8' within hip precautions 8' within hip precautions NP-time                                          Neuro Re-Ed             Pt education about HEP             Bridges c bolster squeeze 3x10 3x10 3x10  3x10  3x10  3x10 3x10 3x10 3x10   Standing march to  3x10 on foam 3x10 on foam 3x10 on foam  3x10 3x10  3x10 on foam  3x10 on foam  NV    Standing SLR 2x15  OTB 2x15 OTB 2x15 OTB  3x10 OTB 3x10 OTB 3x10 OTB 3x10 OTB 3x10 OTB 3x10 OTB   Standing hip ext/abd 2x15 ea OTB 2x15 ea OTB 2x15 ea OTB   3x10 ea OTB 3x10 ea OTB 3x10 ea OTB 3x10 ea OTB 3x10 ea OTB 3x10 ea OTB   SLRs 3x10 1#  3x10 1# 3x10 1#  4x6 4x6  4x6  3x10 3x10 3x10   Sidestepping at bar 4 laps at 1/2 mirror OTB  4 laps at 1/2 mirror OTB 3 laps at mirror OTB      2 laps at mirror OTB 2 laps at mirror OTB 2 laps at mirror OTB                Ther Ex             Clamshell 3x10 Blue 3x10 Blue 3x10 Blue  3x10 ea OTB 3x10 ea OTB 3x10 ea OTB 3x10 blue 3x10 blue 3x10 blue   Reverse clamshell 3x10 on R   peach 3x10 on R each 3x10 on R peach       3x10 peach 3x10 peach   HR/TR 3x10 ea 3x10 ea 3x10 ea  3x10 ea 3x10 ea  3x10 ea  3x10 ea  3x10 ea  3x10 ea    S/L hip abd 3x10 R 3x10 pm R 3x10 on R  3x10 3x10  W/ foam roll cue 3x10  W/ foam roll cue 3x10 3x10 3x10   Nustep 7' L2   L2 7' L2 7'  7' 7' 7' 7' 8' 7'                                          Ther Activity             Sit to stands c airex 3x10 3x10 3x10   2x10  2x10  2x10  2x10 3x10 3x10   Stair ascent/descent Step over step c B railing  5x Step over step c B railing 5x Step over step c B railing 5x  Step over step x5 c B railing Step over step c B railing x5 Step over step c B railing x5 Step over step c B railing x5 Step over step c B railing x5 NP-time   SL leg press 3x10 95# 3x10 95# 3x10 105#  3x10 75# 3x10 75# 3x10 75# 3x10 85# 3x10 85# 3x10 95#   Gait Training                                       Modalities

## 2023-04-27 ENCOUNTER — OFFICE VISIT (OUTPATIENT)
Dept: PHYSICAL THERAPY | Facility: CLINIC | Age: 58
End: 2023-04-27

## 2023-04-27 DIAGNOSIS — M16.11 PRIMARY OSTEOARTHRITIS OF RIGHT HIP: ICD-10-CM

## 2023-04-27 DIAGNOSIS — Z96.641 STATUS POST RIGHT HIP REPLACEMENT: Primary | ICD-10-CM

## 2023-04-27 NOTE — PROGRESS NOTES
Daily Note     Today's date: 2023  Patient name: Yue Jarrett  : 1965  MRN: [id number]  Referring provider: AGATHA Gee*  Dx:   Encounter Diagnosis     ICD-10-CM    1  Status post right hip replacement  Z96 641       2  Primary osteoarthritis of right hip  M16 11                      Subjective: Patient states steps are getting a little better  Objective: See treatment diary below      Assessment: Tolerated treatment well  No progression as he is still challenged with outlined resistance and reps  He showing improvement with form and ecc control  He did have one instance of LOB during STS but corrected his foot placement and performed well for rest of set with no assistance  Patient demonstrated fatigue post treatment      Plan: Continue per plan of care        Precautions: R hip anterior ORTIZ , prior hx L CVA      Manuals 4/17 4/20 4/24 4/27   3/30 4/3 4/6 4/10   PROM R hip 8' within hip precautions 8' within hip precautions 8' within hip precautions 8' w/ hip precautions    8' within hip precautions 8' within hip precautions 8' within hip precautions NP-time                                          Neuro Re-Ed             Pt education about HEP             Bridges c bolster squeeze 3x10 3x10 3x10 3x10   3x10 3x10 3x10 3x10   Standing march to  3x10 on foam 3x10 on foam 3x10 on foam NV   3x10 on foam  3x10 on foam  NV    Standing SLR 2x15  OTB 2x15 OTB 2x15 OTB 2x15 OTB   3x10 OTB 3x10 OTB 3x10 OTB 3x10 OTB   Standing hip ext/abd 2x15 ea OTB 2x15 ea OTB 2x15 ea OTB  2x15 ea OTB   3x10 ea OTB 3x10 ea OTB 3x10 ea OTB 3x10 ea OTB   SLRs 3x10 1#  3x10 1# 3x10 1# 3x10 1#    4x6  3x10 3x10 3x10   Sidestepping at bar 4 laps at 1/2 mirror OTB  4 laps at 1/2 mirror OTB 3 laps at mirror OTB  3 laps at mirror OTB    2 laps at mirror OTB 2 laps at mirror OTB 2 laps at mirror OTB                Ther Ex             Clamshell 3x10 Blue 3x10 Blue 3x10 Blue 3x10 ea Blue   3x10 ea OTB 3x10 blue 3x10 blue 3x10 blue   Reverse clamshell 3x10 on R   peach 3x10 on R each 3x10 on R peach  3x10 R peach     3x10 peach 3x10 peach   HR/TR 3x10 ea 3x10 ea 3x10 ea 3x10 ea   3x10 ea  3x10 ea  3x10 ea  3x10 ea    S/L hip abd 3x10 R 3x10 pm R 3x10 on R 3x10 on R    3x10  W/ foam roll cue 3x10 3x10 3x10   Nustep 7' L2   L2 7' L2 7' L2 7'   7' 7' 8' 7'                                          Ther Activity             Sit to stands c airex 3x10 3x10 3x10  3x10    2x10  2x10 3x10 3x10   Stair ascent/descent Step over step c B railing  5x Step over step c B railing 5x Step over step c B railing 5x reciprocal c B rail 5x    Step over step c B railing x5 Step over step c B railing x5 Step over step c B railing x5 NP-time   SL leg press 3x10 95# 3x10 95# 3x10 105# 3x10 105#   3x10 75# 3x10 85# 3x10 85# 3x10 95#   Gait Training                                       Modalities

## 2023-05-01 ENCOUNTER — OFFICE VISIT (OUTPATIENT)
Dept: PHYSICAL THERAPY | Facility: CLINIC | Age: 58
End: 2023-05-01

## 2023-05-01 DIAGNOSIS — M16.11 PRIMARY OSTEOARTHRITIS OF RIGHT HIP: ICD-10-CM

## 2023-05-01 DIAGNOSIS — Z96.641 STATUS POST RIGHT HIP REPLACEMENT: Primary | ICD-10-CM

## 2023-05-01 NOTE — PROGRESS NOTES
Daily Note     Today's date: 2023  Patient name: Cherylene Goodpasture  : 1965  MRN: [id number]  Referring provider: AGATHA Rutledge*  Dx:   Encounter Diagnosis     ICD-10-CM    1  Status post right hip replacement  Z96 641       2  Primary osteoarthritis of right hip  M16 11                      Subjective: Pt reports his knee is feeling alright  Objective: See treatment diary below      Assessment: Tolerated treatment well  Able to progress resistance with hip IR with good challenge noted  Lateral step ups added with good tolerance  Cues for proper performance with good carryover  No progression of rest of outlined program but he is making improvments in hold times and form requiring less verbal and tactile cues  Patient demonstrated fatigue post treatment and would benefit from continued PT      Plan: Continue per plan of care        Precautions: R hip anterior ORTIZ , prior hx L CVA      Manuals         PROM R hip 8' within hip precautions 8' within hip precautions 8' within hip precautions 8' w/ hip precautions  8' w/ hip precautions                                               Neuro Re-Ed             Pt education about HEP             Bridges c bolster squeeze 3x10 3x10 3x10 3x10 3x10        Standing march to  3x10 on foam 3x10 on foam 3x10 on foam NV 3x10 on foam        Standing SLR 2x15  OTB 2x15 OTB 2x15 OTB 2x15 OTB 2x15 OTB        Standing hip ext/abd 2x15 ea OTB 2x15 ea OTB 2x15 ea OTB  2x15 ea OTB 2x15 OTB        SLRs 3x10 1#  3x10 1# 3x10 1# 3x10 1#  3x10 1#        Sidestepping at bar 4 laps at 1/2 mirror OTB  4 laps at 1/2 mirror OTB 3 laps at mirror OTB  3 laps at mirror OTB 3 laps at mirror OTB                     Ther Ex             Clamshell 3x10 Blue 3x10 Blue 3x10 Blue 3x10 ea Blue 3x10 ea Blue        Reverse clamshell 3x10 on R   peach 3x10 on R each 3x10 on R peach  3x10 R peach 2x10 R obed  seated c bolster        HR/TR 3x10 ea 3x10 ea 3x10 ea 3x10 "ea 3x10 ea         S/L hip abd 3x10 R 3x10 pm R 3x10 on R 3x10 on R  3x10 on R        Nustep 7' L2   L2 7' L2 7' L2 7' L3 7'                                               Ther Activity             Sit to stands c airex 3x10 3x10 3x10  3x10  3x10         Stair ascent/descent Step over step c B railing  5x Step over step c B railing 5x Step over step c B railing 5x reciprocal c B rail 5x  NV        SL leg press 3x10 95# 3x10 95# 3x10 105# 3x10 105# 3x10 105#        Lateral step up     4\" 1x15        Gait Training                                       Modalities                                                    "

## 2023-05-04 ENCOUNTER — OFFICE VISIT (OUTPATIENT)
Dept: PHYSICAL THERAPY | Facility: CLINIC | Age: 58
End: 2023-05-04

## 2023-05-04 DIAGNOSIS — M16.11 PRIMARY OSTEOARTHRITIS OF RIGHT HIP: ICD-10-CM

## 2023-05-04 DIAGNOSIS — Z96.641 STATUS POST RIGHT HIP REPLACEMENT: Primary | ICD-10-CM

## 2023-05-04 NOTE — PROGRESS NOTES
Daily Note     Today's date: 2023  Patient name: Elsie Mejia  : 1965  MRN: [id number]  Referring provider: AGATHA Cameron*  Dx:   Encounter Diagnosis     ICD-10-CM    1  Status post right hip replacement  Z96 641       2  Primary osteoarthritis of right hip  M16 11           Start Time: 0915  Stop Time: 1000  Total time in clinic (min): 45 minutes    Subjective: Pt reports continued progress with his R hip and that he is less dependent on cane, still carries it for longer distance walking  Objective: See treatment diary below      Assessment: Tolerated treatment well  Patient demonstrated fatigue post treatment, exhibited good technique with therapeutic exercises and would benefit from continued PT  Discussed with pt current POC and asked for pt input, pt happy with POC and wants to continue to focus on stairs as he feels that is still his weak point  Pt able to progress resistance for hip strengthening and stabilization exercises and with continued improvements in technique without cueing  Also increased step height for lateral step ups, pt tolerated program well without discomfort  Plan: Continue per plan of care  Progress treatment as tolerated         Precautions: R hip anterior ORTIZ , prior hx L CVA      Manuals        PROM R hip 8' within hip precautions 8' within hip precautions 8' within hip precautions 8' w/ hip precautions  8' w/ hip precautions NP                                              Neuro Re-Ed             Pt education about HEP             Bridges c bolster squeeze 3x10 3x10 3x10 3x10 3x10 3x10       Standing march to  3x10 on foam 3x10 on foam 3x10 on foam NV 3x10 on foam 3x10 on foam Add weight      Standing SLR 2x15  OTB 2x15 OTB 2x15 OTB 2x15 OTB 2x15 OTB 2x15 grn       Standing hip ext/abd 2x15 ea OTB 2x15 ea OTB 2x15 ea OTB  2x15 ea OTB 2x15 OTB 2x15 grn       SLRs 3x10 1#  3x10 1# 3x10 1# 3x10 1#  3x10 1# 3x10 1# "  Sidestepping at bar 4 laps at 1/2 mirror OTB  4 laps at 1/2 mirror OTB 3 laps at mirror OTB  3 laps at mirror OTB 3 laps at mirror OTB 3 laps at mirror grn                    Ther Ex             Clamshell 3x10 Blue 3x10 Blue 3x10 Blue 3x10 ea Blue 3x10 ea Blue 3x10 ea Blue       Reverse clamshell 3x10 on R   peach 3x10 on R each 3x10 on R peach  3x10 R peach 2x10 R obed  seated c bolster 3x10 R obed  seated       HR/TR 3x10 ea 3x10 ea 3x10 ea 3x10 ea 3x10 ea  NP       S/L hip abd 3x10 R 3x10 pm R 3x10 on R 3x10 on R  3x10 on R 3x10 1#       Nustep 7' L2   L2 7' L2 7' L2 7' L3 7' L3 7'                                              Ther Activity             Sit to stands c airex 3x10 3x10 3x10  3x10  3x10  3x10       Stair ascent/descent Step over step c B railing  5x Step over step c B railing 5x Step over step c B railing 5x reciprocal c B rail 5x  NV reciprocal c B rail 5x  Try 1 handrail      SL leg press 3x10 95# 3x10 95# 3x10 105# 3x10 105# 3x10 105# 3x10 105# increase      Lateral step up     4\" 1x15 x20 6\"       Gait Training                                       Modalities                                                      "

## 2023-05-08 ENCOUNTER — OFFICE VISIT (OUTPATIENT)
Dept: PHYSICAL THERAPY | Facility: CLINIC | Age: 58
End: 2023-05-08

## 2023-05-08 DIAGNOSIS — M16.11 PRIMARY OSTEOARTHRITIS OF RIGHT HIP: ICD-10-CM

## 2023-05-08 DIAGNOSIS — Z96.641 STATUS POST RIGHT HIP REPLACEMENT: Primary | ICD-10-CM

## 2023-05-08 NOTE — PROGRESS NOTES
Daily Note     Today's date: 2023  Patient name: Remy Whalen  : 1965  MRN: [id number]  Referring provider: AGATHA Rivas*  Dx:   Encounter Diagnosis     ICD-10-CM    1  Status post right hip replacement  Z96 641       2  Primary osteoarthritis of right hip  M16 11           Start Time: 4650  Stop Time: 0940  Total time in clinic (min): 47 minutes    Subjective: Pt reports he has appointment scheduled this Thursday for evaluation of his L knee  Objective: See treatment diary below      Assessment: Tolerated treatment well  Patient demonstrated fatigue post treatment, exhibited good technique with therapeutic exercises and would benefit from continued PT  Focused more on single leg stance activities due to pt's continued difficulty with balance and use of B handrails for balance support during stairs  Performed stairs with use of only L handrail today and pt demonstrated greater limitation due to his L knee compared to his R hip, difficulty with descending stairs leading with his RLE due to support required on L knee  Pt also reports some subjective pain in L knee with descending stairs, no pain in R hip  Plan: Continue per plan of care  Progress treatment as tolerated         Precautions: R hip anterior ORTIZ , prior hx L CVA      Manuals       PROM R hip 8' within hip precautions 8' within hip precautions 8' within hip precautions 8' w/ hip precautions  8' w/ hip precautions NP 5' w/ hip precautions                                             Neuro Re-Ed             Pt education about HEP             Bridges c bolster squeeze 3x10 3x10 3x10 3x10 3x10 3x10 3x10      Standing march to  3x10 on foam 3x10 on foam 3x10 on foam NV 3x10 on foam 3x10 on foam 3x10 on foam; less bar support      Standing SLR 2x15  OTB 2x15 OTB 2x15 OTB 2x15 OTB 2x15 OTB 2x15 grn 2x15 grn      Standing hip ext/abd 2x15 ea OTB 2x15 ea OTB 2x15 ea OTB  2x15 ea OTB 2x15 OTB 2x15 "grn 2x15 grn      SLRs 3x10 1#  3x10 1# 3x10 1# 3x10 1#  3x10 1# 3x10 1# 3x10 2#      Sidestepping at bar 4 laps at 1/2 mirror OTB  4 laps at 1/2 mirror OTB 3 laps at mirror OTB  3 laps at mirror OTB 3 laps at mirror OTB 3 laps at mirror grn NP      SLS       5x20\" R      Ther Ex             Clamshell 3x10 Blue 3x10 Blue 3x10 Blue 3x10 ea Blue 3x10 ea Blue 3x10 ea Blue NP      Reverse clamshell 3x10 on R   peach 3x10 on R each 3x10 on R peach  3x10 R peach 2x10 R obed  seated c bolster 3x10 R obed  seated 3x10 R obed  seated      HR/TR 3x10 ea 3x10 ea 3x10 ea 3x10 ea 3x10 ea  NP       S/L hip abd 3x10 R 3x10 pm R 3x10 on R 3x10 on R  3x10 on R 3x10 1# 3x10 1#      Nustep 7' L2   L2 7' L2 7' L2 7' L3 7' L3 7' 7' L4                                             Ther Activity             Sit to stands c airex 3x10 3x10 3x10  3x10  3x10  3x10 3x10      Stair ascent/descent Step over step c B railing  5x Step over step c B railing 5x Step over step c B railing 5x reciprocal c B rail 5x  NV reciprocal c B rail 5x  L handrail x3, B handrail x4      SL leg press 3x10 95# 3x10 95# 3x10 105# 3x10 105# 3x10 105# 3x10 105# i3x10 115#      Lateral step up     4\" 1x15 x20 6\" np      Gait Training                                       Modalities                                                        "

## 2023-05-11 ENCOUNTER — OFFICE VISIT (OUTPATIENT)
Dept: OBGYN CLINIC | Facility: CLINIC | Age: 58
End: 2023-05-11

## 2023-05-11 ENCOUNTER — OFFICE VISIT (OUTPATIENT)
Dept: PHYSICAL THERAPY | Facility: CLINIC | Age: 58
End: 2023-05-11

## 2023-05-11 ENCOUNTER — APPOINTMENT (OUTPATIENT)
Dept: RADIOLOGY | Facility: CLINIC | Age: 58
End: 2023-05-11

## 2023-05-11 VITALS
WEIGHT: 176 LBS | HEIGHT: 69 IN | OXYGEN SATURATION: 98 % | RESPIRATION RATE: 18 BRPM | DIASTOLIC BLOOD PRESSURE: 78 MMHG | SYSTOLIC BLOOD PRESSURE: 116 MMHG | BODY MASS INDEX: 26.07 KG/M2 | HEART RATE: 85 BPM

## 2023-05-11 DIAGNOSIS — M17.12 PRIMARY OSTEOARTHRITIS OF LEFT KNEE: Primary | ICD-10-CM

## 2023-05-11 DIAGNOSIS — M16.11 PRIMARY OSTEOARTHRITIS OF RIGHT HIP: ICD-10-CM

## 2023-05-11 DIAGNOSIS — Z96.641 STATUS POST RIGHT HIP REPLACEMENT: Primary | ICD-10-CM

## 2023-05-11 DIAGNOSIS — M87.052 AVASCULAR NECROSIS OF LEFT FEMUR (HCC): ICD-10-CM

## 2023-05-11 DIAGNOSIS — B20 HIV DISEASE (HCC): ICD-10-CM

## 2023-05-11 DIAGNOSIS — M25.562 LEFT KNEE PAIN, UNSPECIFIED CHRONICITY: ICD-10-CM

## 2023-05-11 NOTE — PROGRESS NOTES
"Daily Note     Today's date: 2023  Patient name: Tiffany Pryor  : 1965  MRN: [id number]  Referring provider: AGATHA Esparza*  Dx:   Encounter Diagnosis     ICD-10-CM    1  Status post right hip replacement  Z96 641       2  Primary osteoarthritis of right hip  M16 11                      Subjective: Patient states he sees the Dr today about his L knee, his R hip has been doing well  Objective: See treatment diary below      Assessment: Patient doing well with outlined program in regards to R hip  He is limited more with stairs due to L knee  Cont challenge with charted reps and weights  Patient demonstrated fatigue post treatment and would benefit from continued PT      Plan: Progress treatment as tolerated  Precautions: R hip anterior ORTIZ , prior hx L CVA      Manuals      PROM R hip 8' within hip precautions 8' within hip precautions 8' within hip precautions 8' w/ hip precautions  8' w/ hip precautions NP 5' w/ hip precautions 5' w   Hip precautions                                            Neuro Re-Ed             Pt education about HEP             Bridges c bolster squeeze 3x10 3x10 3x10 3x10 3x10 3x10 3x10 3x10      Standing march to  3x10 on foam 3x10 on foam 3x10 on foam NV 3x10 on foam 3x10 on foam 3x10 on foam; less bar support      Standing SLR 2x15  OTB 2x15 OTB 2x15 OTB 2x15 OTB 2x15 OTB 2x15 grn 2x15 grn 2x15 grn     Standing hip ext/abd 2x15 ea OTB 2x15 ea OTB 2x15 ea OTB  2x15 ea OTB 2x15 OTB 2x15 grn 2x15 grn 2x15 grn     SLRs 3x10 1#  3x10 1# 3x10 1# 3x10 1#  3x10 1# 3x10 1# 3x10 2# 3x10 1#     Sidestepping at bar 4 laps at 1/2 mirror OTB  4 laps at 1/2 mirror OTB 3 laps at mirror OTB  3 laps at mirror OTB 3 laps at mirror OTB 3 laps at mirror grn NP      SLS       5x20\" R 5x20\" R     Ther Ex             Clamshell 3x10 Blue 3x10 Blue 3x10 Blue 3x10 ea Blue 3x10 ea Blue 3x10 ea Blue NP      Reverse clamshell 3x10 on R   peach " "3x10 on R each 3x10 on R peach  3x10 R peach 2x10 R obed  seated c bolster 3x10 R obed  seated 3x10 R obed  seated 3x10 R OTB seated c bolster     HR/TR 3x10 ea 3x10 ea 3x10 ea 3x10 ea 3x10 ea  NP       S/L hip abd 3x10 R 3x10 pm R 3x10 on R 3x10 on R  3x10 on R 3x10 1# 3x10 1# 3x10 1#      Nustep 7' L2   L2 7' L2 7' L2 7' L3 7' L3 7' 7' L4 7' L4                                             Ther Activity             Sit to stands c airex 3x10 3x10 3x10  3x10  3x10  3x10 3x10 3x10     Stair ascent/descent Step over step c B railing  5x Step over step c B railing 5x Step over step c B railing 5x reciprocal c B rail 5x  NV reciprocal c B rail 5x  L handrail x3, B handrail x4 B handrail 5x reciprocal     SL leg press 3x10 95# 3x10 95# 3x10 105# 3x10 105# 3x10 105# 3x10 105# i3x10 115# 3x10 115#      Lateral step up     4\" 1x15 x20 6\" np      Gait Training                                       Modalities                                                          "

## 2023-05-11 NOTE — PROGRESS NOTES
"    Subjective:    Chief Complaint   Patient presents with   • Left Knee - Clicking, Locking, Swelling, Pain       Neal York is a 65 y o  male complains of left knee pain  Onset of the symptoms was several months ago  Mechanism of injury: states that he did fall onto the knee several months ago when symptoms began  Aggravating factors: walking  and weight bearing  Treatment to date: rest  Symptoms have progressed to a point and plateaued  Patient was referred to MRI of the left knee by [facility]  Results revealed multifocal AVN tricompartmental osteoarthritis and low-grade partial-thickness tear of the PCL and popliteus muscle  Patient had recent hip replacement of the right hip performed in February by [facility]  The following portions of the patient's history were reviewed and updated as appropriate: allergies, current medications, past family history, past medical history, past social history, past surgical history and problem list     Occupation:      Review of Systems   Constitutional: Negative for fever  Hem/Lymph/Immuno: Negative for blood clots  Does not bruise/bleed easily  Psychiatric/Behavioral: Negative for confusion  Objective:  /78   Pulse 85   Resp 18   Ht 5' 9\" (1 753 m)   Wt 79 8 kg (176 lb)   SpO2 98%   BMI 25 99 kg/m²   Skin: no rashes, lesions, skin discolorations, lacerations  Vasculature: normal popliteal and pedal pulse, normal skin color, normal capillary refill in extremity, no lower extremity edema  Neurologic: Neurologic exam is normal throughout lower extremities, Awake, alert, and oriented x3, no apparent distress  Musculoskeletal: Left KNEE EXAM  Gait: limping gait positive  Inspection:No erythema, no induration  There is no gross deformity  Palpation: Swelling: negative  Effusion: positive   Medial joint line TTP: negative  Lateral joint line TTP: positive  ROM: Full flexion and extension  Special tests: [facility]'s: " negative,   Dial test: negative  Instability to varus/valgus stress: negative  Anterior Drawer: negative Lachman's test: negative  Posterior Drawer: negative  Crepitus: negative        Imaging:       Assessment/Plan:  1  Primary osteoarthritis of left knee    - XR knee 4+ vw left injury; Future    2  Avascular necrosis of left femur (HCC)        > 45 min devoted to review of previous, pertinent medical records, imaging, discussion of treatment options, counseling and documentation  Prior MRI results of the left knee reviewed from [facility] which revealed multifocal AVN and tricompartmental osteoarthritis  Benny Garcias Unfortunately I do not have access to the disc images however the report was available  we discussed the nature of osteoarthritis and AVN at length and detailed the treatment approach  AVN development most likely complication secondary to underlying HIV history  Directed to ice the area daily for 20 minutes at a time using a barrier to protect the skin- stressed specifically icing after activity to address inflammation  Recommending formal PT- Rx provided for PT  We discussed the role for cortisone injection for the left knee and patient is agreeable  Upon review of patient's blood work his A1c level was 4 4 which is suspected to be low due to anemia history  Prior BMP does have elevated glucose readings  I advised patient to first discuss with primary care doctor in regards to blood sugars and determine if patient is appropriate to receive cortisone injection  Should sx's worsen or any concerns arise, they were advised to follow up sooner or seek more immediate medical attention  All of the patient's concerns were addressed and questions answered  They verbalized agreement with and understanding of the treatment plan

## 2023-05-15 ENCOUNTER — OFFICE VISIT (OUTPATIENT)
Dept: PHYSICAL THERAPY | Facility: CLINIC | Age: 58
End: 2023-05-15

## 2023-05-15 DIAGNOSIS — Z96.641 STATUS POST RIGHT HIP REPLACEMENT: Primary | ICD-10-CM

## 2023-05-15 DIAGNOSIS — M16.11 PRIMARY OSTEOARTHRITIS OF RIGHT HIP: ICD-10-CM

## 2023-05-15 NOTE — PROGRESS NOTES
Daily Note     Today's date: 5/15/2023  Patient name: Tiffany Pryor  : 1965  MRN: [id number]  Referring provider: AGATHA Esparza*  Dx:   Encounter Diagnosis     ICD-10-CM    1  Status post right hip replacement  Z96 641       2  Primary osteoarthritis of right hip  M16 11                      Subjective: Patient offers no new complaints  States he is getting around ok at home, still challenged with basement steps and one hand rail, needs to perform one at a time  Objective: See treatment diary below      Assessment: Tolerated treatment well  Able to complete charted program with minimal increase from subjective baseline  Cont to be challenged with SLR and abd, requiring cues to maintain full knee ext with poor carryover  Patient demonstrated fatigue post treatment and would benefit from continued PT      Plan: Progress treatment as tolerated  Precautions: R hip anterior ORTIZ , prior hx L CVA      Manuals 4/17 4/20 4/24 4/27 5/1 5/4 5/8 5/11 5/15    PROM R hip 8' within hip precautions 8' within hip precautions 8' within hip precautions 8' w/ hip precautions  8' w/ hip precautions NP 5' w/ hip precautions 5' w   Hip precautions 5' w/ hip precautions                                           Neuro Re-Ed             Pt education about HEP             Bridges c bolster squeeze 3x10 3x10 3x10 3x10 3x10 3x10 3x10 3x10  3x10    Standing march to  3x10 on foam 3x10 on foam 3x10 on foam NV 3x10 on foam 3x10 on foam 3x10 on foam; less bar support  3x10 on foam less bar support    Standing SLR 2x15  OTB 2x15 OTB 2x15 OTB 2x15 OTB 2x15 OTB 2x15 grn 2x15 grn 2x15 grn 2x15  grn    Standing hip ext/abd 2x15 ea OTB 2x15 ea OTB 2x15 ea OTB  2x15 ea OTB 2x15 OTB 2x15 grn 2x15 grn 2x15 grn 2x15 grn    SLRs 3x10 1#  3x10 1# 3x10 1# 3x10 1#  3x10 1# 3x10 1# 3x10 2# 3x10 1# 3x10 1#    Sidestepping at bar 4 laps at 1/2 mirror OTB  4 laps at 1/2 mirror OTB 3 laps at mirror OTB  3 laps at mirror OTB 3 laps at "mirror OTB 3 laps at mirror grn NP      SLS       5x20\" R 5x20\" R 5x20\"    Ther Ex             Clamshell 3x10 Blue 3x10 Blue 3x10 Blue 3x10 ea Blue 3x10 ea Blue 3x10 ea Blue NP  3x10 ea BTB    Reverse clamshell 3x10 on R   peach 3x10 on R each 3x10 on R peach  3x10 R peach 2x10 R obed  seated c bolster 3x10 R obed  seated 3x10 R obed  seated 3x10 R OTB seated c bolster 3x10 R OTB seated c bolster    HR/TR 3x10 ea 3x10 ea 3x10 ea 3x10 ea 3x10 ea  NP       S/L hip abd 3x10 R 3x10 pm R 3x10 on R 3x10 on R  3x10 on R 3x10 1# 3x10 1# 3x10 1#  3x10 1#    Nustep 7' L2   L2 7' L2 7' L2 7' L3 7' L3 7' 7' L4 7' L4  7' L4                                            Ther Activity             Sit to stands c airex 3x10 3x10 3x10  3x10  3x10  3x10 3x10 3x10 3x10     Stair ascent/descent Step over step c B railing  5x Step over step c B railing 5x Step over step c B railing 5x reciprocal c B rail 5x  NV reciprocal c B rail 5x  L handrail x3, B handrail x4 B handrail 5x reciprocal     SL leg press 3x10 95# 3x10 95# 3x10 105# 3x10 105# 3x10 105# 3x10 105# i3x10 115# 3x10 115#  3x10 115#    Lateral step up     4\" 1x15 x20 6\" np      Gait Training                                       Modalities                                                            "

## 2023-05-18 ENCOUNTER — OFFICE VISIT (OUTPATIENT)
Dept: PHYSICAL THERAPY | Facility: CLINIC | Age: 58
End: 2023-05-18

## 2023-05-18 DIAGNOSIS — M16.11 PRIMARY OSTEOARTHRITIS OF RIGHT HIP: ICD-10-CM

## 2023-05-18 DIAGNOSIS — Z96.641 STATUS POST RIGHT HIP REPLACEMENT: Primary | ICD-10-CM

## 2023-05-18 NOTE — PROGRESS NOTES
Daily Note     Today's date: 2023  Patient name: Tiffanie Dickerson  : 1965  MRN: [id number]  Referring provider: AGATHA Das*  Dx:   Encounter Diagnosis     ICD-10-CM    1  Status post right hip replacement  Z96 641       2  Primary osteoarthritis of right hip  M16 11                Subjective: Pt reports that he is still having difficulty performing steps  Notes that his knee is still bothering him  Objective: See treatment diary below      Assessment: Pt continued to require his UE's in order to perform functional activities such as stairs and STS transfers  He additionally continued to require B/L UE assistance for marching on foam  Progressed balance to rocker board this visit to improve proprioceptive training  He had difficulty maintaining TKE and required verbal cues to facilitate TKE, additionally integrated SLR on the non surgical leg in order to facilitate improved strengthening in order to improve overall functional mobility which he additionally had difficulty maintaining TKE with  He was intermittently able to achieve TKE with cueing  He continued to compensate with hip flexor activation during SL hip abduction and require constant tactile cues to inhibit hip flexor activity  Continue to progress proximal hip abductor strengthening with balance progression in order to maximize his LOF  Plan: Continue per plan of care  Progress treatment as tolerated  Precautions: R hip anterior ORTIZ , prior hx L CVA      Manuals 4/17 4/20 4/24 4/27 5/1 5/4 5/8 5/11 5/15 5/18   PROM R hip 8' within hip precautions 8' within hip precautions 8' within hip precautions 8' w/ hip precautions  8' w/ hip precautions NP 5' w/ hip precautions 5' w   Hip precautions 5' w/ hip precautions 5' w/ hip precautions                                          Neuro Re-Ed             Pt education about HEP             Bridges c bolster squeeze 3x10 3x10 3x10 3x10 3x10 3x10 3x10 3x10  3x10 3x10 "  Standing march to 90 3x10 on foam 3x10 on foam 3x10 on foam NV 3x10 on foam 3x10 on foam 3x10 on foam; less bar support  3x10 on foam less bar support 3x10 on foam less bar support   Standing SLR 2x15  OTB 2x15 OTB 2x15 OTB 2x15 OTB 2x15 OTB 2x15 grn 2x15 grn 2x15 grn 2x15  grn 2x15 grn   Standing hip ext/abd 2x15 ea OTB 2x15 ea OTB 2x15 ea OTB  2x15 ea OTB 2x15 OTB 2x15 grn 2x15 grn 2x15 grn 2x15 grn 2x15 grn   SLRs 3x10 1#  3x10 1# 3x10 1# 3x10 1#  3x10 1# 3x10 1# 3x10 2# 3x10 1# 3x10 1# 3x10 1# L/R   Sidestepping at bar 4 laps at 1/2 mirror OTB  4 laps at 1/2 mirror OTB 3 laps at mirror OTB  3 laps at mirror OTB 3 laps at mirror OTB 3 laps at Javy Wilber grn NP      SLS       5x20\" R 5x20\" R 5x20\" 5x20\" L & R   Rocker board A/P          x20    Ther Ex             Clamshell 3x10 Blue 3x10 Blue 3x10 Blue 3x10 ea Blue 3x10 ea Blue 3x10 ea Blue NP  3x10 ea BTB Np, nv   Reverse clamshell 3x10 on R   peach 3x10 on R each 3x10 on R peach  3x10 R peach 2x10 R obed  seated c bolster 3x10 R obed  seated 3x10 R obed  seated 3x10 R OTB seated c bolster 3x10 R OTB seated c bolster 3x10 R OTB seated c bolster   HR/TR 3x10 ea 3x10 ea 3x10 ea 3x10 ea 3x10 ea  NP       S/L hip abd 3x10 R 3x10 pm R 3x10 on R 3x10 on R  3x10 on R 3x10 1# 3x10 1# 3x10 1#  3x10 1# 3x10 1#   Nustep 7' L2   L2 7' L2 7' L2 7' L3 7' L3 7' 7' L4 7' L4  7' L4  7' L4                                          Ther Activity             Sit to stands c airex 3x10 3x10 3x10  3x10  3x10  3x10 3x10 3x10 3x10  3x10   Stair ascent/descent Step over step c B railing  5x Step over step c B railing 5x Step over step c B railing 5x reciprocal c B rail 5x  NV reciprocal c B rail 5x  L handrail x3, B handrail x4 B handrail 5x reciprocal  B handrail 5x reciprocal   SL leg press 3x10 95# 3x10 95# 3x10 105# 3x10 105# 3x10 105# 3x10 105# i3x10 115# 3x10 115#  3x10 115# 3x10 115#   Lateral step up     4\" 1x15 x20 6\" np      Gait Training                                     " Modalities

## 2023-05-22 ENCOUNTER — OFFICE VISIT (OUTPATIENT)
Dept: PHYSICAL THERAPY | Facility: CLINIC | Age: 58
End: 2023-05-22

## 2023-05-22 DIAGNOSIS — Z96.641 STATUS POST RIGHT HIP REPLACEMENT: Primary | ICD-10-CM

## 2023-05-22 DIAGNOSIS — M16.11 PRIMARY OSTEOARTHRITIS OF RIGHT HIP: ICD-10-CM

## 2023-05-22 NOTE — PROGRESS NOTES
"Daily Note     Today's date: 2023  Patient name: Lisbet Harrington  : 1965  MRN: [id number]  Referring provider: AGATHA Hartley*  Dx:   Encounter Diagnosis     ICD-10-CM    1  Status post right hip replacement  Z96 641       2  Primary osteoarthritis of right hip  M16 11           Start Time: 08  Stop Time: 932  Total time in clinic (min): 44 minutes    Subjective: Pt reports slight difficulty with stairs due to R hip, but otherwise feels good with R hip and has greater limitation due to L knee  Objective: See treatment diary below      Assessment: Tolerated treatment well  Patient demonstrated fatigue post treatment, exhibited good technique with therapeutic exercises and would benefit from continued PT  Pt continues to demonstrate progress with R hip, progressed resistance today  Will continue to address deficits as needed and then transition into physical therapy addressing L knee as pt continues f/u treatment for it with doctors, discussed this with pt and pt agreeable  Pt unable to perform sit to stands without airex, used added resistance for continued progression  Plan: Continue per plan of care  Progress treatment as tolerated  Precautions: R hip anterior ORTIZ , prior hx L CVA      Manuals 5/22   4/27 5/1 5/4 5/8 5/11 5/15 5/18   PROM R hip 5' w/ hip precautions   8' w/ hip precautions  8' w/ hip precautions NP 5' w/ hip precautions 5' w   Hip precautions 5' w/ hip precautions 5' w/ hip precautions                                          Neuro Re-Ed             Pt education about HEP             Bridges c bolster squeeze NP   3x10 3x10 3x10 3x10 3x10  3x10 3x10   Standing march to  3x10 on foam \"\"   NV 3x10 on foam 3x10 on foam 3x10 on foam; less bar support  3x10 on foam less bar support 3x10 on foam less bar support   Standing SLR 2x15 grn   2x15 OTB 2x15 OTB 2x15 grn 2x15 grn 2x15 grn 2x15  grn 2x15 grn   Standing hip ext/abd 2x15 grn   2x15 ea OTB 2x15 OTB 2x15 " "grn 2x15 grn 2x15 grn 2x15 grn 2x15 grn   SLRs 3x10 2#   3x10 1#  3x10 1# 3x10 1# 3x10 2# 3x10 1# 3x10 1# 3x10 1# L/R   Sidestepping at bar    3 laps at mirror OTB 3 laps at mirror OTB 3 laps at mirror grn NP      SLS       5x20\" R 5x20\" R 5x20\" 5x20\" L & R   Rocker board A/P NV         x20    Ther Ex             Clamshell    3x10 ea Blue 3x10 ea Blue 3x10 ea Blue NP  3x10 ea BTB Np, nv   Reverse clamshell 3x10 R peach seated c bolster   3x10 R peach 2x10 R obed  seated c bolster 3x10 R obed  seated 3x10 R obed  seated 3x10 R OTB seated c bolster 3x10 R OTB seated c bolster 3x10 R OTB seated c bolster   HR/TR    3x10 ea 3x10 ea  NP       S/L hip abd 3x10 2#   3x10 on R  3x10 on R 3x10 1# 3x10 1# 3x10 1#  3x10 1# 3x10 1#   Nustep 7' L4   L2 7' L3 7' L3 7' 7' L4 7' L4  7' L4  7' L4                                          Ther Activity             Sit to stands c airex 3x10 10#   3x10  3x10  3x10 3x10 3x10 3x10  3x10   Stair ascent/descent 8x, R handrail   reciprocal c B rail 5x  NV reciprocal c B rail 5x  L handrail x3, B handrail x4 B handrail 5x reciprocal  B handrail 5x reciprocal   SL leg press 3x10 115#; increase NV   3x10 105# 3x10 105# 3x10 105# i3x10 115# 3x10 115#  3x10 115# 3x10 115#   Lateral step up x20 8\"    4\" 1x15 x20 6\" np      Gait Training                                       Modalities                                                                "

## 2023-05-25 ENCOUNTER — OFFICE VISIT (OUTPATIENT)
Dept: PHYSICAL THERAPY | Facility: CLINIC | Age: 58
End: 2023-05-25

## 2023-05-25 DIAGNOSIS — Z96.641 STATUS POST RIGHT HIP REPLACEMENT: Primary | ICD-10-CM

## 2023-05-25 DIAGNOSIS — M16.11 PRIMARY OSTEOARTHRITIS OF RIGHT HIP: ICD-10-CM

## 2023-05-25 NOTE — PROGRESS NOTES
"Daily Note     Today's date: 2023  Patient name: Fei Hay  : 1965  MRN: [id number]  Referring provider: AGATHA Jennings*  Dx:   Encounter Diagnosis     ICD-10-CM    1  Status post right hip replacement  Z96 641       2  Primary osteoarthritis of right hip  M16 11                      Subjective: Pt reports that his hip is good, the knee is worse  Objective: See treatment diary below      Assessment: Tolerated treatment well  Patient exhibited good technique with therapeutic exercises and would benefit from continued PT  Progressed balance and functional strengthening this visit in order to maximize his LOF  He did require UE assistance in order to maintain upright balance, continue to progress balance/proprioception as able  Plan: Continue per plan of care  Progress treatment as tolerated  Precautions: R hip anterior ROTIZ , prior hx L CVA      Manuals 5/22 5/25  4/27 5/1 5/4 5/8 5/11 5/15 5/18   PROM R hip 5' w/ hip precautions np  8' w/ hip precautions  8' w/ hip precautions NP 5' w/ hip precautions 5' w   Hip precautions 5' w/ hip precautions 5' w/ hip precautions                                          Neuro Re-Ed             Pt education about HEP             Bridges c bolster squeeze NP 2x10 GTB   3x10 3x10 3x10 3x10 3x10  3x10 3x10   Standing march to  3x10 on foam \"\"   NV 3x10 on foam 3x10 on foam 3x10 on foam; less bar support  3x10 on foam less bar support 3x10 on foam less bar support   Standing SLR 2x15 grn   2x15 OTB 2x15 OTB 2x15 grn 2x15 grn 2x15 grn 2x15  grn 2x15 grn   Standing hip ext/abd 2x15 grn   2x15 ea OTB 2x15 OTB 2x15 grn 2x15 grn 2x15 grn 2x15 grn 2x15 grn   SLRs 3x10 2# 3x10 2# RLE, L 2x10 #0  3x10 1#  3x10 1# 3x10 1# 3x10 2# 3x10 1# 3x10 1# 3x10 1# L/R   Sidestepping at bar    3 laps at mirror OTB 3 laps at mirror OTB 3 laps at mirror grn NP      SLS       5x20\" R 5x20\" R 5x20\" 5x20\" L & R   Side steps on foam   x3 laps            Heel toe " "walk on foam   x3 laps            Rocker board A/P NV x20        x20    Ther Ex             Clamshell    3x10 ea Blue 3x10 ea Blue 3x10 ea Blue NP  3x10 ea BTB Np, nv   Reverse clamshell 3x10 R peach seated c bolster   3x10 R peach 2x10 R obed  seated c bolster 3x10 R obed  seated 3x10 R obed  seated 3x10 R OTB seated c bolster 3x10 R OTB seated c bolster 3x10 R OTB seated c bolster   S/L hip abd 3x10 2#   3x10 on R  3x10 on R 3x10 1# 3x10 1# 3x10 1#  3x10 1# 3x10 1#   Nustep 7' L4 8' L4  L2 7' L3 7' L3 7' 7' L4 7' L4  7' L4  7' L4                             Ther Activity             Sit to stands c airex 3x10 10# 3x10 10#  3x10  3x10  3x10 3x10 3x10 3x10  3x10   Stair ascent/descent 8x, R handrail 8x, R handrail  reciprocal c B rail 5x  NV reciprocal c B rail 5x  L handrail x3, B handrail x4 B handrail 5x reciprocal  B handrail 5x reciprocal   SL leg press 3x10 115#; increase NV 3x10 125#  3x10 105# 3x10 105# 3x10 105# i3x10 115# 3x10 115#  3x10 115# 3x10 115#   Lateral step up x20 8\" x20 8\"   4\" 1x15 x20 6\" np      Gait Training                                       Modalities                                                                  "

## 2023-05-31 ENCOUNTER — OFFICE VISIT (OUTPATIENT)
Dept: PHYSICAL THERAPY | Facility: CLINIC | Age: 58
End: 2023-05-31

## 2023-05-31 DIAGNOSIS — M16.11 PRIMARY OSTEOARTHRITIS OF RIGHT HIP: ICD-10-CM

## 2023-05-31 DIAGNOSIS — Z96.641 STATUS POST RIGHT HIP REPLACEMENT: Primary | ICD-10-CM

## 2023-05-31 NOTE — PROGRESS NOTES
"Daily Note     Today's date: 2023  Patient name: Yvonnie Bosworth  : 1965  MRN: [id number]  Referring provider: AGATHA Monterroso*  Dx:   Encounter Diagnosis     ICD-10-CM    1  Status post right hip replacement  Z96 641       2  Primary osteoarthritis of right hip  M16 11           Start Time: 0930  Stop Time: 1016  Total time in clinic (min): 46 minutes    Subjective: Pt reports his L knee is bothering him more today  Objective: See treatment diary below      Assessment: Tolerated treatment well  Patient demonstrated fatigue post treatment, exhibited good technique with therapeutic exercises and would benefit from continued PT  Continued with focus on functional standing balance and working on balance and weight shifting between limbs  Good tolerance to exercises today, no discomfort noted in R hip  Plan: Continue per plan of care  Progress treatment as tolerated  Precautions: R hip anterior ORTIZ , prior hx L CVA      Manuals 5/22 5/25 5/31    5/8 5/11 5/15 5/18   PROM R hip 5' w/ hip precautions np     5' w/ hip precautions 5' w   Hip precautions 5' w/ hip precautions 5' w/ hip precautions                                          Neuro Re-Ed             Pt education about HEP             Bridges c TB ER NP 2x10 GTB  2x10 GTB     3x10 3x10  3x10 3x10   Standing march to  3x10 on foam \"\"  3x10 on foam \"\"    3x10 on foam; less bar support  3x10 on foam less bar support 3x10 on foam less bar support   Standing SLR 2x15 grn      2x15 grn 2x15 grn 2x15  grn 2x15 grn   Standing hip ext/abd 2x15 grn      2x15 grn 2x15 grn 2x15 grn 2x15 grn   SLRs 3x10 2# 3x10 2# RLE, L 2x10 #0 3x10 2# RLE, L 2x10 #0    3x10 2# 3x10 1# 3x10 1# 3x10 1# L/R   Sidestepping at bar       NP      SLS       5x20\" R 5x20\" R 5x20\" 5x20\" L & R   Side steps on foam   x3 laps  x3 laps           Heel toe walk on foam   x3 laps  x3 laps           Rocker board A/P NV x20        x20    Ther Ex             Clamshell    " "   NP  3x10 ea BTB Np, nv   Reverse clamshell 3x10 R peach seated c bolster  3x10 R peach seated c bolster    3x10 R obed  seated 3x10 R OTB seated c bolster 3x10 R OTB seated c bolster 3x10 R OTB seated c bolster   S/L hip abd 3x10 2#  3x10 2#    3x10 1# 3x10 1#  3x10 1# 3x10 1#   Nustep 7' L4 8' L4 7' L4    7' L4 7' L4  7' L4  7' L4                             Ther Activity             Sit to stands c airex 3x10 10# 3x10 10# 3x10 10#    3x10 3x10 3x10  3x10   Stair ascent/descent 8x, R handrail 8x, R handrail 8x, R handrail    L handrail x3, B handrail x4 B handrail 5x reciprocal  B handrail 5x reciprocal   SL leg press 3x10 115#; increase NV 3x10 125# 3x10 125#    i3x10 115# 3x10 115#  3x10 115# 3x10 115#   Lateral step up x20 8\" x20 8\" x30 8\"    np      Gait Training                                       Modalities                                                                    "

## 2023-06-01 ENCOUNTER — EVALUATION (OUTPATIENT)
Dept: PHYSICAL THERAPY | Facility: CLINIC | Age: 58
End: 2023-06-01

## 2023-06-01 DIAGNOSIS — Z96.641 STATUS POST RIGHT HIP REPLACEMENT: Primary | ICD-10-CM

## 2023-06-01 DIAGNOSIS — M16.11 PRIMARY OSTEOARTHRITIS OF RIGHT HIP: ICD-10-CM

## 2023-06-01 NOTE — PROGRESS NOTES
PT Re-Evaluation     Today's date: 2023  Patient name: Amish Saravia  : 1965  MRN: [id number]  Referring provider: AGATHA eFrnandes*  Dx:   Encounter Diagnosis     ICD-10-CM    1  Status post right hip replacement  Z96 641       2  Primary osteoarthritis of right hip  M16 11           Start Time: 930  Stop Time: 1015  Total time in clinic (min): 45 minutes    Assessment  Assessment details: Pt is a 65 y o  male presenting to physical therapy s/p R ORTIZ with anterior approach  Upon re-evaluation, pt demonstrates improved strength and stability of his RLE  Pt also reports subjective improvements of pain and functional ability  Pt demonstrates good strength in R hip, however does demonstrate slightly impaired stability and balance in his R leg compared to his L leg  Pt still notes some limitations with bending forward and lifting, community ambulation, and stairs  Pt still utilizing [email or web address] [place] for community ambulation for balance support as needed  PT POC will focus on more functional movement patterns and lifting, as well as balance in order to restore stability in R leg  Pt would benefit from continued skilled physical therapy to facilitate return to prior level of function and improve functional mobility  Impairments: abnormal gait, abnormal or restricted ROM, activity intolerance, impaired balance, impaired physical strength, lacks appropriate home exercise program and pain with function  Functional limitations: bending forward and lifting, community ambulation, and stairs  Symptom irritability: lowUnderstanding of Dx/Px/POC: good   Prognosis: good    Goals  STG 4 - weeks  1  Patient will demonstrate increased R hip strength to 4+/5 in all planes to facilitate functional ability  - progressing  2  Patient will demonstrate ability to ambulate 500' with good stability and least restrictive device to facilitate functional ability  - met  LTG 8 - weeks  1   Patient will demonstrate decreased pain at worst with ambulation/stair negotiation to 2/10 or better to facilitate functional ability  - progressing  2  Patient will demonstrate increased FOTO score from 62 at baseline to predicted score of 80 or higher to facilitate functional ability  - progressing     Plan  Patient would benefit from: skilled physical therapy  Planned modality interventions: cryotherapy, thermotherapy: hydrocollator packs and unattended electrical stimulation  Planned therapy interventions: balance, functional ROM exercises, flexibility, gait training, home exercise program, joint mobilization, manual therapy, massage, neuromuscular re-education, patient education, strengthening, stretching, therapeutic activities and therapeutic exercise  Frequency: 2x week  Duration in weeks: 6  Plan of Care beginning date: 6/1/2023  Plan of Care expiration date: 7/13/2023  Treatment plan discussed with: patient        Subjective Evaluation    History of Present Illness  Mechanism of injury: surgery  Mechanism of injury: Pt is s/p R anterior approach ORTIZ on 2/8/23  Pt reports prior hx of L ORTIZ which made his hips uneven and placed stress on his R hip  Pt now reports his hips feel even  Pt attended inpatient rehab and progressed well, now is at home and referred for outpatient PT  Pt reports chronic hx of CVAs affecting his L side, notes most recent one was in 2016 and continues to have weakness on L side of body  Pt using SBQC now in R hand since his R hip surgery  Pt reports his hip precautions are to not swing his leg behind him too fast, watching how he bends over, and not crossing his legs  Upon re-evaluation, pt reports overall improvement of function, with improvements of walking, transfers, and stairs  Pt feels his strength is now better than it was prior to surgery  Pt reports significant improvement of pain, still gets occasional pain with bending over or walking too long   Pt does report feeling slightly unsteady on his feet, drifts slightly when walking, and uses SBQC when in the community  Pain  Current pain ratin  At best pain ratin  At worst pain ratin  Location: Anterior and lateral R hip  Quality: discomfort  Relieving factors: relaxation  Aggravating factors: stair climbing, walking and sitting  Progression: improved    Patient Goals  Patient goals for therapy: decreased pain and increased strength  Patient goal: To walk without the cane and be able to do stairs normally         Objective     Active Range of Motion     Right Hip   Flexion: 90 degrees   Abduction: 20 degrees     Passive Range of Motion     Right Hip   Flexion: 95 degrees     Strength/Myotome Testing     Left Hip   Planes of Motion   Flexion: 3+  Extension: 4-  Abduction: 4-  External rotation: 4  Internal rotation: 4-    Right Hip   Planes of Motion   Flexion: 4+  Extension: 4+  Abduction: 4  External rotation: 4  Internal rotation: 4+    Left Knee   Flexion: 3+  Extension: 3+    Right Knee   Flexion: 4+  Extension: 5    Left Ankle/Foot   Dorsiflexion: 3  Plantar flexion: 3+    Right Ankle/Foot   Dorsiflexion: 4  Plantar flexion: 4    Ambulation   Weight-Bearing Status   Assistive device used: small base quad cane    Additional Weight-Bearing Status Details  Ambulates without AD in the house  Uses QC for community ambulation  Ambulation: Stairs   Ascend stairs: independent  Pattern: reciprocal  Descend stairs: independent  Pattern: reciprocal    Observational Gait   Decreased walking speed and stride length       Additional Observational Gait Details  Ambulates with SBQC in RUE, swings RUE concurrently with RLE    Functional Assessment        Single Leg Stance   Left: 13 seconds  Right: 7 seconds             Precautions: R hip anterior ORTIZ /8, prior hx L CVA    Manuals  6         PROM R hip 5' w/ hip precautions np                                                  Neuro Re-Ed             Pt education     6'         Bridges c TB ER NP 2x10 GTB  2x10 "GTB  2x10 GTB          Standing march to 90 3x10 on foam \"\"  3x10 on foam \"\"          Standing SLR 2x15 grn            Standing hip ext/abd 2x15 grn            SLRs 3x10 2# 3x10 2# RLE, L 2x10 #0 3x10 2# RLE, L 2x10 #0 3x10 2#         SLS    5x20\"         Y-balance    1x10 by bar         Side steps on foam   x3 laps  x3 laps  x3 laps          Heel toe walk on foam   x3 laps  x3 laps  x3 laps          Rocker board A/P NV x20           Ther Ex             Clamshell             Reverse clamshell 3x10 R peach seated c bolster  3x10 R peach seated c bolster          S/L hip abd 3x10 2#  3x10 2# 3x10 2#         Nustep 7' L4 8' L4 7' L4 7' L4                                   Ther Activity             Sit to stands c airex 3x10 10# 3x10 10# 3x10 10# 3x10 15#         Stair ascent/descent 8x, R handrail 8x, R handrail 8x, R handrail 10x, R handrail         SL leg press 3x10 115#; increase NV 3x10 125# 3x10 125# 3x10 125#         Lateral step up x20 8\" x20 8\" x30 8\"          RDL weight     x15 15# DB on 8\" box         Gait Training                                       Modalities                                                "

## 2023-06-01 NOTE — LETTER
2023    ELIAN Steward    Patient: Charleen Adams   YOB: 1965   Date of Visit: 2023     Encounter Diagnosis     ICD-10-CM    1  Status post right hip replacement  Z96 641       2  Primary osteoarthritis of right hip  M16 11           Dear Dr Genevieve Morgan: Thank you for your recent referral of Charleen Adams  Please review the attached evaluation summary from [address] [facility] [address] recent visit  Please verify that you agree with the plan of care by signing the attached order  If you have any questions or concerns, please do not hesitate to call  I sincerely appreciate the opportunity to share in the care of one of your patients and hope to have another opportunity to work with you in the near future  Sincerely,    Raul Saavedra, PT      Referring Provider:      I certify that I have read the below Plan of Care and certify the need for these services furnished under this plan of treatment while under my care  Alli Shelby PA-C  [address] [place] [address]  Via Fax: [phone]          PT Re-Evaluation     Today's date: 2023  Patient name: Charleen Adams  : 1965  MRN: [id number]  Referring provider: AGATHA Thacker*  Dx:   Encounter Diagnosis     ICD-10-CM    1  Status post right hip replacement  Z96 641       2  Primary osteoarthritis of right hip  M16 11           Start Time: 0930  Stop Time: 1015  Total time in clinic (min): 45 minutes    Assessment  Assessment details: Pt is a 65 y o  male presenting to physical therapy s/p R ORTIZ with anterior approach  Upon re-evaluation, pt demonstrates improved strength and stability of his RLE  Pt also reports subjective improvements of pain and functional ability  Pt demonstrates good strength in R hip, however does demonstrate slightly impaired stability and balance in his R leg compared to his L leg   Pt still notes some limitations with bending forward and lifting, community ambulation, and stairs  Pt still utilizing [facility] for community ambulation for balance support as needed  PT POC will focus on more functional movement patterns and lifting, as well as balance in order to restore stability in R leg  Pt would benefit from continued skilled physical therapy to facilitate return to prior level of function and improve functional mobility  Impairments: abnormal gait, abnormal or restricted ROM, activity intolerance, impaired balance, impaired physical strength, lacks appropriate home exercise program and pain with function  Functional limitations: bending forward and lifting, community ambulation, and stairs  Symptom irritability: lowUnderstanding of Dx/Px/POC: good   Prognosis: good    Goals  STG 4 - weeks  1  Patient will demonstrate increased R hip strength to 4+/5 in all planes to facilitate functional ability  - progressing  2  Patient will demonstrate ability to ambulate 500' with good stability and least restrictive device to facilitate functional ability  - met  LTG 8 - weeks  1  Patient will demonstrate decreased pain at worst with ambulation/stair negotiation to 2/10 or better to facilitate functional ability  - progressing  2  Patient will demonstrate increased FOTO score from 62 at baseline to predicted score of 80 or higher to facilitate functional ability   - progressing     Plan  Patient would benefit from: skilled physical therapy  Planned modality interventions: cryotherapy, thermotherapy: hydrocollator packs and unattended electrical stimulation  Planned therapy interventions: balance, functional ROM exercises, flexibility, gait training, home exercise program, joint mobilization, manual therapy, massage, neuromuscular re-education, patient education, strengthening, stretching, therapeutic activities and therapeutic exercise  Frequency: 2x week  Duration in weeks: 6  Plan of Care beginning date: 6/1/2023  Plan of Care expiration date: 2023  Treatment plan discussed with: patient        Subjective Evaluation    History of Present Illness  Mechanism of injury: surgery  Mechanism of injury: Pt is s/p R anterior approach ORTIZ on 23  Pt reports prior hx of L ORTIZ which made his hips uneven and placed stress on his R hip  Pt now reports his hips feel even  Pt attended inpatient rehab and progressed well, now is at home and referred for outpatient PT  Pt reports chronic hx of CVAs affecting his L side, notes most recent one was in  and continues to have weakness on L side of body  Pt using SBQC now in R hand since his R hip surgery  Pt reports his hip precautions are to not swing his leg behind him too fast, watching how he bends over, and not crossing his legs  Upon re-evaluation, pt reports overall improvement of function, with improvements of walking, transfers, and stairs  Pt feels his strength is now better than it was prior to surgery  Pt reports significant improvement of pain, still gets occasional pain with bending over or walking too long  Pt does report feeling slightly unsteady on his feet, drifts slightly when walking, and uses SBQC when in the community     Pain  Current pain ratin  At best pain ratin  At worst pain ratin  Location: Anterior and lateral R hip  Quality: discomfort  Relieving factors: relaxation  Aggravating factors: stair climbing, walking and sitting  Progression: improved    Patient Goals  Patient goals for therapy: decreased pain and increased strength  Patient goal: To walk without the cane and be able to do stairs normally         Objective     Active Range of Motion     Right Hip   Flexion: 90 degrees   Abduction: 20 degrees     Passive Range of Motion     Right Hip   Flexion: 95 degrees     Strength/Myotome Testing     Left Hip   Planes of Motion   Flexion: 3+  Extension: 4-  Abduction: 4-  External rotation: 4  Internal rotation: 4-    Right Hip   Planes of Motion   Flexion: 4+  Extension: "4+  Abduction: 4  External rotation: 4  Internal rotation: 4+    Left Knee   Flexion: 3+  Extension: 3+    Right Knee   Flexion: 4+  Extension: 5    Left Ankle/Foot   Dorsiflexion: 3  Plantar flexion: 3+    Right Ankle/Foot   Dorsiflexion: 4  Plantar flexion: 4    Ambulation   Weight-Bearing Status   Assistive device used: small base quad cane    Additional Weight-Bearing Status Details  Ambulates without AD in the house  Uses QC for community ambulation  Ambulation: Stairs   Ascend stairs: independent  Pattern: reciprocal  Descend stairs: independent  Pattern: reciprocal    Observational Gait   Decreased walking speed and stride length       Additional Observational Gait Details  Ambulates with SBQC in RUE, swings RUE concurrently with RLE    Functional Assessment        Single Leg Stance   Left: 13 seconds  Right: 7 seconds            Precautions: R hip anterior ORTIZ 2/8, prior hx L CVA    Manuals 5/22 5/25 5/31 6/1         PROM R hip 5' w/ hip precautions np                                                  Neuro Re-Ed             Pt education     6'         Bridges c TB ER NP 2x10 GTB  2x10 GTB  2x10 GTB          Standing march to 90 3x10 on foam \"\"  3x10 on foam \"\"          Standing SLR 2x15 grn            Standing hip ext/abd 2x15 grn            SLRs 3x10 2# 3x10 2# RLE, L 2x10 #0 3x10 2# RLE, L 2x10 #0 3x10 2#         SLS    5x20\"         Y-balance    1x10 by bar         Side steps on foam   x3 laps  x3 laps  x3 laps          Heel toe walk on foam   x3 laps  x3 laps  x3 laps          Rocker board A/P NV x20           Ther Ex             Clamshell             Reverse clamshell 3x10 R peach seated c bolster  3x10 R peach seated c bolster          S/L hip abd 3x10 2#  3x10 2# 3x10 2#         Nustep 7' L4 8' L4 7' L4 7' L4                                   Ther Activity             Sit to stands c airex 3x10 10# 3x10 10# 3x10 10# 3x10 15#         Stair ascent/descent 8x, R handrail 8x, R handrail 8x, R " "handrail 10x, R handrail         SL leg press 3x10 115#; increase NV 3x10 125# 3x10 125# 3x10 125#         Lateral step up x20 8\" x20 8\" x30 8\"          RDL weight     x15 15# DB on 8\" box         Gait Training                                       Modalities                                                                 "

## 2023-06-05 ENCOUNTER — APPOINTMENT (OUTPATIENT)
Dept: PHYSICAL THERAPY | Facility: CLINIC | Age: 58
End: 2023-06-05
Payer: COMMERCIAL

## 2023-06-05 DIAGNOSIS — Z96.641 STATUS POST RIGHT HIP REPLACEMENT: Primary | ICD-10-CM

## 2023-06-05 DIAGNOSIS — M16.11 PRIMARY OSTEOARTHRITIS OF RIGHT HIP: ICD-10-CM

## 2023-06-05 NOTE — PROGRESS NOTES
"Daily Note     Today's date: 2023  Patient name: Katie Hale  : 1965  MRN: [id number]  Referring provider: AGATHA Campbell*  Dx:   Encounter Diagnosis     ICD-10-CM    1  Status post right hip replacement  Z96 641       2  Primary osteoarthritis of right hip  M16 11                      Subjective: ***      Objective: See treatment diary below      Assessment: Tolerated treatment {Tolerated treatment :}   Patient {assessment:}      Plan: {PLAN:}     Precautions: R hip anterior ORTIZ , prior hx L CVA    Manuals          PROM R hip 5' w/ hip precautions np                                                  Neuro Re-Ed             Pt education     6'         Bridges c TB ER NP 2x10 GTB  2x10 GTB  2x10 GTB          Standing march to  3x10 on foam \"\"  3x10 on foam \"\"          Standing SLR 2x15 grn            Standing hip ext/abd 2x15 grn            SLRs 3x10 2# 3x10 2# RLE, L 2x10 #0 3x10 2# RLE, L 2x10 #0 3x10 2#         SLS    5x20\"         Y-balance    1x10 by bar         Side steps on foam   x3 laps  x3 laps  x3 laps          Heel toe walk on foam   x3 laps  x3 laps  x3 laps          Rocker board A/P NV x20           Ther Ex             Clamshell             Reverse clamshell 3x10 R peach seated c bolster  3x10 R peach seated c bolster          S/L hip abd 3x10 2#  3x10 2# 3x10 2#         Nustep 7' L4 8' L4 7' L4 7' L4                                   Ther Activity             Sit to stands c airex 3x10 10# 3x10 10# 3x10 10# 3x10 15#         Stair ascent/descent 8x, R handrail 8x, R handrail 8x, R handrail 10x, R handrail         SL leg press 3x10 115#; increase NV 3x10 125# 3x10 125# 3x10 125#         Lateral step up x20 8\" x20 8\" x30 8\"          RDL weight     x15 15# DB on 8\" box         Gait Training                                       Modalities                                                  "

## 2023-06-06 ENCOUNTER — APPOINTMENT (OUTPATIENT)
Dept: PHYSICAL THERAPY | Facility: CLINIC | Age: 58
End: 2023-06-06
Payer: COMMERCIAL

## 2023-06-08 ENCOUNTER — OFFICE VISIT (OUTPATIENT)
Dept: PHYSICAL THERAPY | Facility: CLINIC | Age: 58
End: 2023-06-08
Payer: COMMERCIAL

## 2023-06-08 DIAGNOSIS — Z96.641 STATUS POST RIGHT HIP REPLACEMENT: Primary | ICD-10-CM

## 2023-06-08 DIAGNOSIS — M16.11 PRIMARY OSTEOARTHRITIS OF RIGHT HIP: ICD-10-CM

## 2023-06-08 PROCEDURE — 97530 THERAPEUTIC ACTIVITIES: CPT

## 2023-06-08 PROCEDURE — 97110 THERAPEUTIC EXERCISES: CPT

## 2023-06-08 PROCEDURE — 97112 NEUROMUSCULAR REEDUCATION: CPT

## 2023-06-08 NOTE — PROGRESS NOTES
"Daily Note     Today's date: 2023  Patient name: Ebonie Govea  : 1965  MRN: [id number]  Referring provider: AGATHA Cardenas*  Dx:   Encounter Diagnosis     ICD-10-CM    1  Status post right hip replacement  Z96 641       2  Primary osteoarthritis of right hip  M16 11                      Subjective: Patient notes he is doing better  Cont to feel a little unstable when on his RLE  Objective: See treatment diary below      Assessment: Tolerated treatment well  SLS and Y balance performed bilaterally due to patients reliance on  previously affected LE and instability on R  Quad lag as he fatigues with SLR  He is still challenged with SL abd on R but form is improving, less hip flexor compensation than in previous sessions  Patient demonstrated fatigue post treatment and would benefit from continued PT to improve LE stability and control  Plan: Progress treatment as tolerated         Precautions: R hip anterior ORTIZ , prior hx L CVA    Manuals          PROM R hip 5' w/ hip precautions np                                                  Neuro Re-Ed             Pt education     6'         Bridges c TB ER NP 2x10 GTB  2x10 GTB  2x10 GTB  2x10 GTB        Standing march to  3x10 on foam \"\"  3x10 on foam \"\"  High knee   2x15 3\" on foam        Standing SLR 2x15 grn            Standing hip ext/abd 2x15 grn            SLRs 3x10 2# 3x10 2# RLE, L 2x10 #0 3x10 2# RLE, L 2x10 #0 3x10 2# 3x10 2# on R        SLS    5x20\" 5x20\" bilat        Y-balance    1x10 by bar 1x10 by bar UE support and 3 cones        Side steps on foam   x3 laps  x3 laps  x3 laps  x3 laps         Heel toe walk on foam   x3 laps  x3 laps  x3 laps  x3 laps         Rocker board A/P NV x20           Ther Ex             Clamshell             Reverse clamshell 3x10 R peach seated c bolster  3x10 R peach seated c bolster          S/L hip abd 3x10 2#  3x10 2# 3x10 2# 3x10 2#        Nustep 7' L4 8' L4 7' L4 7' L4 7' " "L4                                  Ther Activity             Sit to stands c airex 3x10 10# 3x10 10# 3x10 10# 3x10 15# 3x10 15#         Stair ascent/descent 8x, R handrail 8x, R handrail 8x, R handrail 10x, R handrail 10x R handrail         SL leg press 3x10 115#; increase NV 3x10 125# 3x10 125# 3x10 125# 3x10 125#        Lateral step up x20 8\" x20 8\" x30 8\"          RDL weight     x15 15# DB on 8\" box 2x10 15# DB on 8\" box        Gait Training                                       Modalities                                                  "

## 2023-06-12 ENCOUNTER — OFFICE VISIT (OUTPATIENT)
Dept: PHYSICAL THERAPY | Facility: CLINIC | Age: 58
End: 2023-06-12
Payer: COMMERCIAL

## 2023-06-12 DIAGNOSIS — Z96.641 STATUS POST RIGHT HIP REPLACEMENT: Primary | ICD-10-CM

## 2023-06-12 DIAGNOSIS — M16.11 PRIMARY OSTEOARTHRITIS OF RIGHT HIP: ICD-10-CM

## 2023-06-12 PROCEDURE — 97530 THERAPEUTIC ACTIVITIES: CPT

## 2023-06-12 PROCEDURE — 97110 THERAPEUTIC EXERCISES: CPT

## 2023-06-12 PROCEDURE — 97112 NEUROMUSCULAR REEDUCATION: CPT

## 2023-06-12 NOTE — PROGRESS NOTES
"Daily Note     Today's date: 2023  Patient name: Jose Alfredo Parish  : 1965  MRN: [id number]  Referring provider: AGATHA Cueva*  Dx:   Encounter Diagnosis     ICD-10-CM    1  Status post right hip replacement  Z96 641       2  Primary osteoarthritis of right hip  M16 11                      Subjective: Pt reports his legs are achy  Objective: See treatment diary below      Assessment: Tolerated treatment well  He denies excessive fatigue or increased pain during session  Good effort noted with all exercises as outlined below  He continues to be challenged by use of weights  Patient demonstrated fatigue post treatment, exhibited good technique with therapeutic exercises and would benefit from continued PT      Plan: Continue per plan of care  Precautions: R hip anterior ORTIZ , prior hx L CVA    Insurance:   CMS/ [place] POC expires PT/OT + Visit Limit?  Co-Insurance   [employer] Medicare   CMS 23 BOMN No   [facility] CMS  BOMN No         Visit/Unit Tracking: FOTO 4th visit  Date              FOTO        Manuals        PROM R hip 5' w/ hip precautions np                                                  Neuro Re-Ed             Pt education     6'         Bridges c TB ER NP 2x10 GTB  2x10 GTB  2x10 GTB  2x10 GTB 2x10 GTB       Standing march to  3x10 on foam \"\"  3x10 on foam \"\"  High knee   2x15 3\" on foam High knee   2x15 3\" on foam       Standing SLR 2x15 grn            Standing hip ext/abd 2x15 grn            SLRs 3x10 2# 3x10 2# RLE, L 2x10 #0 3x10 2# RLE, L 2x10 #0 3x10 2# 3x10 2# on R 3x10 2# on R       SLS    5x20\" 5x20\" bilat 5x20\" bilat       Y-balance    1x10 by bar 1x10 by bar UE support and 3 cones 1x10 by bar UE support and 3 cones       Side steps on foam   x3 laps  x3 laps  x3 laps  x3 laps  x3 laps       Heel toe walk on foam   x3 laps  x3 laps  x3 laps  x3 laps  x3 laps       Rocker board A/P NV x20           Ther Ex     " "        Clamshell             Reverse clamshell 3x10 R peach seated c bolster  3x10 R peach seated c bolster          S/L hip abd 3x10 2#  3x10 2# 3x10 2# 3x10 2# 3x10 2#       Nustep 7' L4 8' L4 7' L4 7' L4 7' L4 7' L4                                 Ther Activity             Sit to stands c airex 3x10 10# 3x10 10# 3x10 10# 3x10 15# 3x10 15#  3x10 15#        Stair ascent/descent 8x, R handrail 8x, R handrail 8x, R handrail 10x, R handrail 10x R handrail  10x R handrail        SL leg press 3x10 115#; increase NV 3x10 125# 3x10 125# 3x10 125# 3x10 125# 3x10 125#       Lateral step up x20 8\" x20 8\" x30 8\"          RDL weight     x15 15# DB on 8\" box 2x10 15# DB on 8\" box 2x10 15# DB on 8\" box       Gait Training                                       Modalities                                                    "

## 2023-06-14 ENCOUNTER — RA CDI HCC (OUTPATIENT)
Dept: OTHER | Facility: HOSPITAL | Age: 58
End: 2023-06-14

## 2023-06-14 NOTE — PROGRESS NOTES
[facility]  coding opportunities       Chart reviewed, no opportunity found:   [address]        Patients Insurance     Medicare Insurance: [employer]

## 2023-06-15 ENCOUNTER — OFFICE VISIT (OUTPATIENT)
Dept: PHYSICAL THERAPY | Facility: CLINIC | Age: 58
End: 2023-06-15
Payer: COMMERCIAL

## 2023-06-15 DIAGNOSIS — Z96.641 STATUS POST RIGHT HIP REPLACEMENT: Primary | ICD-10-CM

## 2023-06-15 DIAGNOSIS — M16.11 PRIMARY OSTEOARTHRITIS OF RIGHT HIP: ICD-10-CM

## 2023-06-15 PROCEDURE — 97112 NEUROMUSCULAR REEDUCATION: CPT

## 2023-06-15 PROCEDURE — 97110 THERAPEUTIC EXERCISES: CPT

## 2023-06-15 NOTE — PROGRESS NOTES
"Daily Note     Today's date: 6/15/2023  Patient name: Ijeoma Wolf  : 1965  MRN: [id number]  Referring provider: AGATHA Bain*  Dx:   Encounter Diagnosis     ICD-10-CM    1  Status post right hip replacement  Z96 641       2  Primary osteoarthritis of right hip  M16 11           Start Time: 0930  Stop Time: 1015  Total time in clinic (min): 45 minutes    Subjective: Patient reports that he is having L knee pain, but the hip feels good  Patient states that he has to get a shot in his knee  Objective: See treatment diary below      Assessment: Tolerated treatment well  Patient participated in skilled PT session focused on strengthening, stretching, and ROM  Patient progressing well with exercises program with no sx  Patient challenged with stair training due to some L knee pain when descending stairs  Patient would continue to benefit from skilled PT interventions to address strengthening, stretching, and ROM  Patient demonstrated fatigue post treatment      Plan: Continue per plan of care  Precautions: R hip anterior ORTIZ , prior hx L CVA    Insurance:   CMS/ AMA POC expires PT/OT + Visit Limit?  Co-Insurance   [employer] Medicare   CMS 23 BOMN No   [facility] CMS  BOMN No         Visit/Unit Tracking: FOTO 4th visit  Date 5/31 6/1 6/8 6/12 6/15            FOTO        Manuals 5/22 5/25 5/31 6/1 6/8 6/12 6/15      PROM R hip 5' w/ hip precautions np                                                  Neuro Re-Ed             Pt education     6'         Bridges c TB ER NP 2x10 GTB  2x10 GTB  2x10 GTB  2x10 GTB 2x10 GTB GTB  2x10      Standing march to  3x10 on foam \"\"  3x10 on foam \"\"  High knee   2x15 3\" on foam High knee   2x15 3\" on foam High knee on foam 3\" 2x15 ea      Standing SLR 2x15 grn            Standing hip ext/abd 2x15 grn            SLRs 3x10 2# 3x10 2# RLE, L 2x10 #0 3x10 2# RLE, L 2x10 #0 3x10 2# 3x10 2# on R 3x10 2# on R 2# AW on R 3x10      SLS    5x20\" " "5x20\" bilat 5x20\" bilat 20' 5x B/L      Y-balance    1x10 by bar 1x10 by bar UE support and 3 cones 1x10 by bar UE support and 3 cones By bar UE support 3 cones 10x      Side steps on foam   x3 laps  x3 laps  x3 laps  x3 laps  x3 laps X 3 laps      Heel toe walk on foam   x3 laps  x3 laps  x3 laps  x3 laps  x3 laps X 3 laps      Rocker board A/P NV x20           Ther Ex             Clamshell             Reverse clamshell 3x10 R peach seated c bolster  3x10 R peach seated c bolster          S/L hip abd 3x10 2#  3x10 2# 3x10 2# 3x10 2# 3x10 2#       Nustep 7' L4 8' L4 7' L4 7' L4 7' L4 7' L4 L4 7'                                Ther Activity             Sit to stands c airex 3x10 10# 3x10 10# 3x10 10# 3x10 15# 3x10 15#  3x10 15#  3x10 15#      Stair ascent/descent 8x, R handrail 8x, R handrail 8x, R handrail 10x, R handrail 10x R handrail  10x R handrail  R hand Rail 10x      SL leg press 3x10 115#; increase NV 3x10 125# 3x10 125# 3x10 125# 3x10 125# 3x10 125# 125# 3x10      Lateral step up x20 8\" x20 8\" x30 8\"          RDL weight     x15 15# DB on 8\" box 2x10 15# DB on 8\" box 2x10 15# DB on 8\" box 15# DB on 8\" box 2x10      Gait Training                                       Modalities                                                      "

## 2023-06-19 ENCOUNTER — APPOINTMENT (OUTPATIENT)
Dept: PHYSICAL THERAPY | Facility: CLINIC | Age: 58
End: 2023-06-19
Payer: COMMERCIAL

## 2023-06-20 ENCOUNTER — OFFICE VISIT (OUTPATIENT)
Dept: FAMILY MEDICINE CLINIC | Facility: CLINIC | Age: 58
End: 2023-06-20
Payer: COMMERCIAL

## 2023-06-20 VITALS
BODY MASS INDEX: 26.36 KG/M2 | WEIGHT: 178 LBS | HEIGHT: 69 IN | OXYGEN SATURATION: 96 % | HEART RATE: 96 BPM | SYSTOLIC BLOOD PRESSURE: 108 MMHG | DIASTOLIC BLOOD PRESSURE: 80 MMHG | TEMPERATURE: 97.1 F

## 2023-06-20 DIAGNOSIS — D58.2 OTHER HEMOGLOBINOPATHIES (HCC): ICD-10-CM

## 2023-06-20 DIAGNOSIS — R10.9 FLANK PAIN: ICD-10-CM

## 2023-06-20 DIAGNOSIS — J44.9 COPD, GROUP B, BY GOLD 2017 CLASSIFICATION (HCC): ICD-10-CM

## 2023-06-20 DIAGNOSIS — R53.1 RIGHT SIDED WEAKNESS: ICD-10-CM

## 2023-06-20 DIAGNOSIS — Z76.89 ENCOUNTER TO ESTABLISH CARE: Primary | ICD-10-CM

## 2023-06-20 DIAGNOSIS — R74.8 ELEVATED ALKALINE PHOSPHATASE LEVEL: ICD-10-CM

## 2023-06-20 DIAGNOSIS — Z82.69: ICD-10-CM

## 2023-06-20 DIAGNOSIS — I27.82 CHRONIC SADDLE PULMONARY EMBOLISM WITH ACUTE COR PULMONALE (HCC): ICD-10-CM

## 2023-06-20 DIAGNOSIS — F33.1 MAJOR DEPRESSIVE DISORDER, RECURRENT EPISODE, MODERATE (HCC): ICD-10-CM

## 2023-06-20 DIAGNOSIS — Z00.00 MEDICARE ANNUAL WELLNESS VISIT, SUBSEQUENT: ICD-10-CM

## 2023-06-20 DIAGNOSIS — B20 HIV DISEASE (HCC): ICD-10-CM

## 2023-06-20 DIAGNOSIS — K21.9 GASTROESOPHAGEAL REFLUX DISEASE WITHOUT ESOPHAGITIS: ICD-10-CM

## 2023-06-20 DIAGNOSIS — E44.1 MILD PROTEIN-CALORIE MALNUTRITION (HCC): ICD-10-CM

## 2023-06-20 DIAGNOSIS — Z12.11 SCREENING FOR COLON CANCER: ICD-10-CM

## 2023-06-20 DIAGNOSIS — I26.02 CHRONIC SADDLE PULMONARY EMBOLISM WITH ACUTE COR PULMONALE (HCC): ICD-10-CM

## 2023-06-20 DIAGNOSIS — F10.99 ALCOHOL-RELATED DISORDER (HCC): ICD-10-CM

## 2023-06-20 PROBLEM — M48.56XA COLLAPSED VERTEBRA, NOT ELSEWHERE CLASSIFIED, LUMBAR REGION, INITIAL ENCOUNTER FOR FRACTURE (HCC): Status: ACTIVE | Noted: 2023-05-25

## 2023-06-20 PROBLEM — H90.5 SENSORINEURAL HEARING LOSS: Status: ACTIVE | Noted: 2020-12-04

## 2023-06-20 PROBLEM — D50.8 IRON DEFICIENCY ANEMIA SECONDARY TO INADEQUATE DIETARY IRON INTAKE: Status: ACTIVE | Noted: 2022-06-28

## 2023-06-20 PROBLEM — Z86.73 CEREBROVASCULAR DISEASE, ARTERIOSCLEROTIC, POST-STROKE: Status: ACTIVE | Noted: 2022-11-01

## 2023-06-20 PROBLEM — M16.11 OSTEOARTHRITIS OF RIGHT HIP: Status: ACTIVE | Noted: 2021-12-07

## 2023-06-20 PROBLEM — Z79.01 LONG TERM CURRENT USE OF ANTICOAGULANT THERAPY: Status: ACTIVE | Noted: 2022-11-01

## 2023-06-20 PROBLEM — E78.5 DYSLIPIDEMIA, GOAL LDL BELOW 130: Status: ACTIVE | Noted: 2022-06-28

## 2023-06-20 PROBLEM — F41.9 ANXIETY: Status: ACTIVE | Noted: 2022-07-21

## 2023-06-20 PROBLEM — I67.9 CEREBROVASCULAR SMALL VESSEL DISEASE: Status: ACTIVE | Noted: 2022-07-21

## 2023-06-20 PROBLEM — R26.2 AMBULATORY DYSFUNCTION: Status: ACTIVE | Noted: 2021-12-05

## 2023-06-20 PROBLEM — G43.401 HEMIPLEGIC MIGRAINE WITH STATUS MIGRAINOSUS, NOT INTRACTABLE: Status: ACTIVE | Noted: 2023-05-25

## 2023-06-20 PROBLEM — E83.42 HYPOMAGNESEMIA: Status: ACTIVE | Noted: 2022-06-28

## 2023-06-20 PROBLEM — I25.10 ATHEROSCLEROSIS OF NATIVE CORONARY ARTERY WITHOUT ANGINA PECTORIS: Status: ACTIVE | Noted: 2023-05-25

## 2023-06-20 PROBLEM — I67.2 CEREBROVASCULAR DISEASE, ARTERIOSCLEROTIC, POST-STROKE: Status: ACTIVE | Noted: 2022-11-01

## 2023-06-20 PROCEDURE — G0439 PPPS, SUBSEQ VISIT: HCPCS | Performed by: NURSE PRACTITIONER

## 2023-06-20 PROCEDURE — 99204 OFFICE O/P NEW MOD 45 MIN: CPT | Performed by: NURSE PRACTITIONER

## 2023-06-20 RX ORDER — FLUTICASONE PROPIONATE AND SALMETEROL XINAFOATE 230; 21 UG/1; UG/1
2 AEROSOL, METERED RESPIRATORY (INHALATION) 2 TIMES DAILY
Qty: 36 G | Refills: 1 | Status: SHIPPED | OUTPATIENT
Start: 2023-06-20 | End: 2023-09-18

## 2023-06-20 RX ORDER — ATORVASTATIN CALCIUM 40 MG/1
40 TABLET, FILM COATED ORAL DAILY
Qty: 90 TABLET | Refills: 1 | Status: SHIPPED | OUTPATIENT
Start: 2023-06-20 | End: 2023-09-18

## 2023-06-20 RX ORDER — MONTELUKAST SODIUM 10 MG/1
10 TABLET ORAL
Qty: 90 TABLET | Refills: 1 | Status: SHIPPED | OUTPATIENT
Start: 2023-06-20 | End: 2023-09-18

## 2023-06-20 RX ORDER — GABAPENTIN 600 MG/1
600 TABLET ORAL 3 TIMES DAILY
Qty: 270 TABLET | Refills: 0 | Status: SHIPPED | OUTPATIENT
Start: 2023-06-20 | End: 2023-09-18

## 2023-06-20 RX ORDER — ACETAMINOPHEN 325 MG/1
325 TABLET ORAL EVERY 6 HOURS
Qty: 100 TABLET | Refills: 1 | Status: SHIPPED | OUTPATIENT
Start: 2023-06-20 | End: 2023-07-20

## 2023-06-20 RX ORDER — TRAZODONE HYDROCHLORIDE 100 MG/1
100 TABLET ORAL
Qty: 90 TABLET | Refills: 1 | Status: SHIPPED | OUTPATIENT
Start: 2023-06-20 | End: 2023-09-18

## 2023-06-20 RX ORDER — PANTOPRAZOLE SODIUM 40 MG/1
40 TABLET, DELAYED RELEASE ORAL DAILY
Qty: 90 TABLET | Refills: 1 | Status: SHIPPED | OUTPATIENT
Start: 2023-06-20 | End: 2023-09-18

## 2023-06-20 NOTE — PROGRESS NOTES
Assessment and Plan:     Pt is a 63 yr old male   Presents in office to establish care and is due for medicare wellness   Underlying medical issues include:   Gait abnormality   COPD   GERD   History of PE and DVTs and currently on coumadin - has been therapeutic on monthly follow up labs   He has had right sided weakness which causes his gait abnormality   B/l hip avascular necrosis - has had surgeries and now has knee issues - sees ORTHO   Underlying HIV - sees HIV clinic in [place] - he is to continue to see them   History of  elevated liver functions   Will need follow up labs   Has underlying depression / anxiety and insomnia has been on trazodone - doing ok was maintained by PCP  Does have neuropathy and chronic pain and is on gabapentin   Has had recurrent flank pain   Labs ordered US of abdomen and renal  Ordered   History of alcohol intake - states he has been stable and remission   Lives with daughter has great support   Has been trying to decrease smoking on his own   Problem List Items Addressed This Visit        Digestive    Gastroesophageal reflux disease without esophagitis    Relevant Medications    traZODone (DESYREL) 100 mg tablet    pantoprazole (PROTONIX) 40 mg tablet    montelukast (SINGULAIR) 10 mg tablet    gabapentin (NEURONTIN) 600 MG tablet    fluticasone-salmeterol (Advair HFA) 230-21 MCG/ACT inhaler    atorvastatin (LIPITOR) 40 mg tablet    acetaminophen (TYLENOL) 325 mg tablet    Other Relevant Orders    CBC and differential    Protime-INR    Gamma GT    Comprehensive metabolic panel    UA w Reflex to Microscopic w Reflex to Culture -Lab Collect    US abdomen complete       Respiratory    COPD, group B, by GOLD 2017 classification (HCC)    Relevant Medications    traZODone (DESYREL) 100 mg tablet    pantoprazole (PROTONIX) 40 mg tablet    montelukast (SINGULAIR) 10 mg tablet    gabapentin (NEURONTIN) 600 MG tablet    fluticasone-salmeterol (Advair HFA) 230-21 MCG/ACT inhaler atorvastatin (LIPITOR) 40 mg tablet    acetaminophen (TYLENOL) 325 mg tablet       Cardiovascular and Mediastinum    Pulmonary embolism (HCC)    Relevant Medications    traZODone (DESYREL) 100 mg tablet    pantoprazole (PROTONIX) 40 mg tablet    montelukast (SINGULAIR) 10 mg tablet    gabapentin (NEURONTIN) 600 MG tablet    fluticasone-salmeterol (Advair HFA) 230-21 MCG/ACT inhaler    atorvastatin (LIPITOR) 40 mg tablet    acetaminophen (TYLENOL) 325 mg tablet       Nervous and Auditory    Right sided weakness    Relevant Medications    traZODone (DESYREL) 100 mg tablet    pantoprazole (PROTONIX) 40 mg tablet    montelukast (SINGULAIR) 10 mg tablet    gabapentin (NEURONTIN) 600 MG tablet    fluticasone-salmeterol (Advair HFA) 230-21 MCG/ACT inhaler    atorvastatin (LIPITOR) 40 mg tablet    acetaminophen (TYLENOL) 325 mg tablet       Musculoskeletal and Integument    Avascular necrosis of right femur (HCC)       Other    Anemia    Relevant Medications    traZODone (DESYREL) 100 mg tablet    pantoprazole (PROTONIX) 40 mg tablet    montelukast (SINGULAIR) 10 mg tablet    gabapentin (NEURONTIN) 600 MG tablet    fluticasone-salmeterol (Advair HFA) 230-21 MCG/ACT inhaler    atorvastatin (LIPITOR) 40 mg tablet    acetaminophen (TYLENOL) 325 mg tablet    HIV disease (HCC)    Relevant Medications    bictegravir-emtricitab-tenofovir alafenamide (BIKTARVY) -25 MG tablet    traZODone (DESYREL) 100 mg tablet    pantoprazole (PROTONIX) 40 mg tablet    montelukast (SINGULAIR) 10 mg tablet    gabapentin (NEURONTIN) 600 MG tablet    fluticasone-salmeterol (Advair HFA) 230-21 MCG/ACT inhaler    atorvastatin (LIPITOR) 40 mg tablet    acetaminophen (TYLENOL) 325 mg tablet    Other Relevant Orders    US abdomen complete    Protein-calorie malnutrition (HCC)    Relevant Medications    traZODone (DESYREL) 100 mg tablet    pantoprazole (PROTONIX) 40 mg tablet    montelukast (SINGULAIR) 10 mg tablet    gabapentin (NEURONTIN) 600 MG tablet    fluticasone-salmeterol (Advair HFA) 230-21 MCG/ACT inhaler    atorvastatin (LIPITOR) 40 mg tablet    acetaminophen (TYLENOL) 325 mg tablet    Alcohol-related disorder (HCC)    Major depressive disorder, recurrent episode, moderate (HCC)    Relevant Medications    traZODone (DESYREL) 100 mg tablet   Other Visit Diagnoses     Encounter to establish care    -  Primary    Screening for colon cancer        Relevant Medications    traZODone (DESYREL) 100 mg tablet    pantoprazole (PROTONIX) 40 mg tablet    montelukast (SINGULAIR) 10 mg tablet    gabapentin (NEURONTIN) 600 MG tablet    fluticasone-salmeterol (Advair HFA) 230-21 MCG/ACT inhaler    atorvastatin (LIPITOR) 40 mg tablet    acetaminophen (TYLENOL) 325 mg tablet    Other Relevant Orders    Ambulatory Referral to Gastroenterology    Cologuard    Flank pain        Relevant Orders    US kidney and bladder    CBC and differential    Protime-INR    Gamma GT    Comprehensive metabolic panel    UA w Reflex to Microscopic w Reflex to Culture -Lab Collect    Cytology, urine    US abdomen complete    Elevated alkaline phosphatase level        Relevant Orders    CBC and differential    Protime-INR    Gamma GT    Comprehensive metabolic panel    UA w Reflex to Microscopic w Reflex to Culture -Lab Collect    Cytology, urine    US abdomen complete    Medicare annual wellness visit, subsequent            BMI Counseling: Body mass index is 26 29 kg/m²  The BMI is above normal  Nutrition recommendations include decreasing portion sizes, encouraging healthy choices of fruits and vegetables, decreasing fast food intake, consuming healthier snacks, limiting drinks that contain sugar, moderation in carbohydrate intake, increasing intake of lean protein, reducing intake of saturated and trans fat and reducing intake of cholesterol  Exercise recommendations include vigorous physical activity 75 minutes/week, exercising 3-5 times per week and strength training exercises   No pharmacotherapy was ordered  Patient referred to PCP  Rationale for BMI follow-up plan is due to patient being overweight or obese  Depression Screening and Follow-up Plan: Patient was screened for depression during today's encounter  They screened negative with a PHQ-2 score of 1  Patient assessed for underlying major depression  Brief counseling provided and recommend additional follow-up/re-evaluation next office visit  Patient advised to follow-up with PCP for further management  Stable     Tobacco Cessation Counseling: Tobacco cessation counseling was provided  The patient is sincerely urged to quit consumption of tobacco  He is not ready to quit tobacco  Medication options and side effects of medication discussed  Preventive health issues were discussed with patient, and age appropriate screening tests were ordered as noted in patient's After Visit Summary  Personalized health advice and appropriate referrals for health education or preventive services given if needed, as noted in patient's After Visit Summary       History of Present Illness:     Patient presents for a Medicare Wellness Visit    Pt is a 63 yr old male   Presents in office to establish care and is due for medicare wellness   Underlying medical issues include:   Gait abnormality   COPD   GERD   History of PE and DVTs and currently on coumadin - has been therapeutic on monthly follow up labs   He has had right sided weakness which causes his gait abnormality   B/l hip avascular necrosis - has had surgeries and now has knee issues - sees ORTHO   Underlying HIV - sees HIV clinic in [place] - he is to continue to see them   History of  elevated liver functions   Will need follow up labs   Has underlying depression / anxiety and insomnia has been on trazodone - doing ok was maintained by PCP  Does have neuropathy and chronic pain and is on gabapentin   Has had recurrent flank pain   Labs ordered US of abdomen and renal  Ordered Patient Care Team:  Mike Palma as PCP - General (Nurse Practitioner)  Zana Nageotte, MD as PCP - PCP-[facility]  MANDY Washington MD (Infectious Diseases)     Review of Systems:     Review of Systems   Constitutional: Positive for fatigue  Negative for chills, fever and unexpected weight change  HENT: Positive for dental problem  Negative for congestion, postnasal drip, sore throat and voice change  Dental issues   Seasonal allergies    Eyes: Negative  Respiratory: Negative for cough and shortness of breath  Underlying COPD   Smoker    Cardiovascular: Negative for chest pain and palpitations  Gastrointestinal: Negative for abdominal distention, abdominal pain, nausea and vomiting  Endocrine: Negative  Genitourinary: Negative for difficulty urinating and flank pain  Musculoskeletal: Positive for arthralgias, gait problem and myalgias  Gait abnormality    Skin: Negative for rash  Allergic/Immunologic: Positive for environmental allergies  Neurological: Positive for weakness  Hematological:        Underlying HIV    Psychiatric/Behavioral: Negative for sleep disturbance and suicidal ideas  The patient is nervous/anxious           Problem List:     Patient Active Problem List   Diagnosis   • Anemia   • Arthritis   • Asthma   • DVT (deep venous thrombosis) ([facility])   • Gastritis   • Hip pain, right   • HIV disease ([facility] [address] )   • Hypertension   • Latent tuberculosis   • Insomnia   • Nicotine dependence   • Night sweats   • Protein-calorie malnutrition (HCC)   • Pulmonary embolism (HCC)   • Avascular necrosis of right femur (HCC)   • Poor dentition   • Chronic pain of both shoulders   • Right sided weakness   • SOB (shortness of breath)   • Alcohol-related disorder (HCC)   • Ambulatory dysfunction   • Anxiety   • Atherosclerosis of native coronary artery without angina pectoris   • Cerebrovascular disease, arteriosclerotic, post-stroke   • Cerebrovascular small vessel disease   • Collapsed vertebra, not elsewhere classified, lumbar region, initial encounter for fracture [facility])   • COPD, group B, by GOLD 2017 classification (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 [facility])    • Closed fracture of left hip, with malunion, subsequent encounter    • Erectile dysfunction    • Hypertension    • Obstructive sleep apnea, adult    • Stroke [facility])      Past Surgical History:   Procedure Laterality Date   • HIP SURGERY     • TOTAL HIP ARTHROPLASTY Bilateral       Family History:     Family History   Problem Relation Age of Onset   • Diabetes Mother    • Hypertension Mother    • Cirrhosis Father         HEPATIC   • Prostate cancer Father    • Tuberculosis Other       Social History:     Social History     Socioeconomic History   • Marital status: Single     Spouse name: None   • Number of children: None   • Years of education: None   • Highest education level: None   Occupational History   • None   Tobacco Use   • Smoking status: Some Days     Types: Cigarettes   • Smokeless tobacco: Never   • Tobacco comments:     CURRENT EVERY DAY SMOKER: 2 CIGARETTES PER DAY (AS PER ALL SCRIPTS)   Vaping Use   • Vaping Use: Never used   Substance and Sexual Activity   • Alcohol use: Not Currently   • Drug use: No     Comment: COCAINE USE (AS PER ALL SCRIPTS)   • Sexual activity: None   Other Topics Concern   • None   Social History Narrative   • None     Social Determinants of Health Financial Resource Strain: Unknown (6/20/2023)    Overall Financial Resource Strain (CARDIA)    • Difficulty of Paying Living Expenses: Patient refused   Food Insecurity: No Food Insecurity (9/18/2022)    Hunger Vital Sign    • Worried About Running Out of Food in the Last Year: Never true    • Ran Out of Food in the Last Year: Never true   Transportation Needs: Unmet Transportation Needs (6/20/2023)    PRAPARE - Transportation    • Lack of Transportation (Medical):  Yes    • Lack of Transportation (Non-Medical): Yes   Physical Activity: Not on file   Stress: Not on file   Social Connections: Not on file   Intimate Partner Violence: Not on file   Housing Stability: Low Risk  (9/18/2022)    Housing Stability Vital Sign    • Unable to Pay for Housing in the Last Year: No    • Number of Places Lived in the Last Year: 1    • Unstable Housing in the Last Year: No      Medications and Allergies:     Current Outpatient Medications   Medication Sig Dispense Refill   • acetaminophen (TYLENOL) 325 mg tablet Take 1 tablet (325 mg total) by mouth every 6 (six) hours 100 tablet 1   • albuterol (PROVENTIL HFA,VENTOLIN HFA) 90 mcg/act inhaler Inhale     • atorvastatin (LIPITOR) 40 mg tablet Take 1 tablet (40 mg total) by mouth daily 90 tablet 1   • bictegravir-emtricitab-tenofovir alafenamide (BIKTARVY) -25 MG tablet Take 1 tablet by mouth daily with breakfast     • Darunavir-Cobicistat 800-150 MG TABS Take 1 tablet by mouth daily     • diphenhydrAMINE (BENADRYL) 25 mg tablet Take 2 tablets (50 mg total) by mouth daily at bedtime 30 tablet 0   • fluticasone-salmeterol (Advair HFA) 230-21 MCG/ACT inhaler Inhale 2 puffs 2 (two) times a day 36 g 1   • gabapentin (NEURONTIN) 600 MG tablet Take 1 tablet (600 mg total) by mouth 3 (three) times a day 270 tablet 0   • montelukast (SINGULAIR) 10 mg tablet Take 1 tablet (10 mg total) by mouth daily at bedtime 90 tablet 1   • pantoprazole (PROTONIX) 40 mg tablet Take 1 tablet (40 mg total) by mouth daily 90 tablet 1   • tadalafil (CIALIS) 20 MG tablet Take 20 mg by mouth daily as needed for erectile dysfunction     • traZODone (DESYREL) 100 mg tablet Take 1 tablet (100 mg total) by mouth daily at bedtime 90 tablet 1   • warfarin (COUMADIN) 10 mg tablet Take 10 mg by mouth daily Mon, wed, sat     • warfarin (COUMADIN) 7 5 mg tablet Take 7 5 mg by mouth daily Tues, Thurs       No current facility-administered medications for this visit  Allergies   Allergen Reactions   • Penicillins    • Food Rash     Annotation - [id number]: pineapples   • Tomato - Food Allergy Rash      Immunizations:     Immunization History   Administered Date(s) Administered   • COVID-19 MODERNA VACC 0 5 ML IM 04/07/2021, 05/05/2021, 11/19/2021   • INFLUENZA 12/11/2017, 11/01/2018, 11/13/2021, 11/14/2022   • Influenza Quadrivalent 3 years and older 12/11/2017, 11/01/2018   • Influenza Quadrivalent, 6-35 Months IM 12/11/2017      Health Maintenance:         Topic Date Due   • Colorectal Cancer Screening  Never done   • Hepatitis C Screening  Completed         Topic Date Due   • Meningococcal ACWY Vaccine (1 - Risk 2-dose series) Never done   • Pneumococcal Vaccine: Pediatrics (0 to 5 Years) and At-Risk Patients (6 to 59 Years) (1 - PCV) Never done   • Hepatitis A Vaccine (1 of 2 - Risk 2-dose series) Never done   • COVID-19 Vaccine (4 - Booster for Marcelle Clayton series) 01/14/2022      Medicare Screening Tests and Risk Assessments:     Rich Lora is here for his Subsequent Wellness visit  Health Risk Assessment:   Patient rates overall health as excellent  Patient feels that their physical health rating is slightly better  Patient is very satisfied with their life  Eyesight was rated as same  Hearing was rated as same  Patient feels that their emotional and mental health rating is much better  Patients states they are sometimes angry  Patient states they are sometimes unusually tired/fatigued   Pain experienced in the last 7 days has been some  Patient's pain rating has been 7/10  Patient states that he has experienced no weight loss or gain in last 6 months  Depression Screening:   PHQ-2 Score: 1      Fall Risk Screening: In the past year, patient has experienced: history of falling in past year    Number of falls: 2 or more  Injured during fall?: No    Feels unsteady when standing or walking?: No    Worried about falling?: No      Home Safety:  Patient has trouble with stairs inside or outside of their home  Patient has working smoke alarms and has working carbon monoxide detector  Home safety hazards include: none  Nutrition:   Current diet is Regular  Medications:   Patient is currently taking over-the-counter supplements  OTC medications include: see medication list  Patient is able to manage medications  Activities of Daily Living (ADLs)/Instrumental Activities of Daily Living (IADLs):   Walk and transfer into and out of bed and chair?: Yes  Dress and groom yourself?: Yes    Bathe or shower yourself?: Yes    Feed yourself?  Yes  Do your laundry/housekeeping?: Yes  Manage your money, pay your bills and track your expenses?: Yes  Make your own meals?: Yes    Do your own shopping?: Yes    Previous Hospitalizations:   Any hospitalizations or ED visits within the last 12 months?: No      Advance Care Planning:   Living will: No    Advanced directive: No    Advanced directive counseling given: Yes    Five wishes given: Yes      Cognitive Screening:   Provider or family/friend/caregiver concerned regarding cognition?: No    PREVENTIVE SCREENINGS      Cardiovascular Screening:    General: Screening Current      Diabetes Screening:     General: Screening Current      Colorectal Cancer Screening:     General: Risks and Benefits Discussed    Due for: Cologuard      Prostate Cancer Screening:    General: Screening Current      Abdominal Aortic Aneurysm (AAA) Screening:    Risk factors include: tobacco use        Lung Cancer "Screening:     General: Screening Not Indicated      Hepatitis C Screening:    General: Screening Current    Screening, Brief Intervention, and Referral to Treatment (SBIRT)    Screening  Typical number of drinks in a day: 0  Typical number of drinks in a week: 0  Interpretation: Low risk drinking behavior  Single Item Drug Screening:  How often have you used an illegal drug (including marijuana) or a prescription medication for non-medical reasons in the past year? never    Single Item Drug Screen Score: 0  Interpretation: Negative screen for possible drug use disorder    Time Spent  Time spent screening/evaluating the patient for alcohol misuse: 2 minutes  Time spent providing alcohol/substance abuse assessment and intervention services: 2 minutes  Other Counseling Topics:   Car/seat belt/driving safety, skin self-exam and calcium and vitamin D intake and regular weightbearing exercise  No results found  Physical Exam:     /80 (BP Location: Left arm, Patient Position: Sitting, Cuff Size: Adult)   Pulse 96   Temp (!) 97 1 °F (36 2 °C)   Ht 5' 9\" (1 753 m)   Wt 80 7 kg (178 lb)   SpO2 96%   BMI 26 29 kg/m²     Physical Exam  Vitals and nursing note reviewed  Constitutional:       Appearance: He is obese  Comments: BMI 26 29   HENT:      Right Ear: There is no impacted cerumen  Left Ear: There is no impacted cerumen  Nose: No congestion or rhinorrhea  Mouth/Throat:      Comments: Poor dentition   Cardiovascular:      Rate and Rhythm: Normal rate and regular rhythm  Heart sounds: Normal heart sounds  Pulmonary:      Breath sounds: Wheezing present  Comments: Decreased   Abdominal:      Palpations: Abdomen is soft  Musculoskeletal:      Cervical back: Normal range of motion  Right lower leg: No edema  Left lower leg: No edema  Comments: Gait abnormality - ambulates with cane    Skin:     General: Skin is dry        Capillary Refill: Capillary " refill takes less than 2 seconds  Neurological:      Mental Status: He is alert and oriented to person, place, and time     Psychiatric:         Mood and Affect: Mood normal          Behavior: Behavior normal           Qian Or, CRNP

## 2023-06-21 ENCOUNTER — OFFICE VISIT (OUTPATIENT)
Dept: PHYSICAL THERAPY | Facility: CLINIC | Age: 58
End: 2023-06-21
Payer: COMMERCIAL

## 2023-06-21 DIAGNOSIS — Z96.641 STATUS POST RIGHT HIP REPLACEMENT: Primary | ICD-10-CM

## 2023-06-21 DIAGNOSIS — M16.11 PRIMARY OSTEOARTHRITIS OF RIGHT HIP: ICD-10-CM

## 2023-06-21 PROCEDURE — 97112 NEUROMUSCULAR REEDUCATION: CPT

## 2023-06-21 PROCEDURE — 97110 THERAPEUTIC EXERCISES: CPT

## 2023-06-21 PROCEDURE — 97530 THERAPEUTIC ACTIVITIES: CPT

## 2023-06-21 NOTE — PROGRESS NOTES
"Daily Note     Today's date: 2023  Patient name: Cherri Maciel  : 1965  MRN: [id number]  Referring provider: AGATHA Zamora*  Dx:   Encounter Diagnosis     ICD-10-CM    1  Status post right hip replacement  Z96 641       2  Primary osteoarthritis of right hip  M16 11           Start Time: 0930  Stop Time: 1015  Total time in clinic (min): 45 minutes    Subjective: Pt reports his L knee has been swelling more and giving him more trouble recently  Objective: See treatment diary below      Assessment: Tolerated treatment well  Patient demonstrated fatigue post treatment, exhibited good technique with therapeutic exercises and would benefit from continued PT  Program progressed with continued focus on challenging R leg stability, initiated single leg RDL in shortened ROM  Close guarding providing and pt instructed to touch down his L leg for weightbearing to provide stability as needed  Pt tolerated progressions well, demonstrated increased muscle fatigue this session  Plan: Continue per plan of care  Progress treatment as tolerated  Precautions: R hip anterior ORTIZ , prior hx L CVA    Insurance:   CMS/ AMA POC expires PT/OT + Visit Limit?  Co-Insurance   [facility] Medicare   CMS 23 BOMN No   [facility] CMS  BOMN No         Visit/Unit Tracking: FOTO 4th visit  Date 5/31 6/1 6/8 6/12 6/15            FOTO         Manuals 5/22 5/25 5/31 6/1 6/8 6/12 6/15 6/21     PROM R hip 5' w/ hip precautions np                                                  Neuro Re-Ed             Pt education     6'         Bridges c TB ER NP 2x10 GTB  2x10 GTB  2x10 GTB  2x10 GTB 2x10 GTB GTB  2x10 2x10 blue     Standing march to  3x10 on foam \"\"  3x10 on foam \"\"  High knee   2x15 3\" on foam High knee   2x15 3\" on foam High knee on foam 3\" 2x15 ea NP     SLRs 3x10 2# 3x10 2# RLE, L 2x10 #0 3x10 2# RLE, L 2x10 #0 3x10 2# 3x10 2# on R 3x10 2# on R 2# AW on R 3x10 3x10 2# on R     SLS    5x20\" " "5x20\" bilat 5x20\" bilat 20' 5x B/L 20' 5x B/L     Y-balance    1x10 by bar 1x10 by bar UE support and 3 cones 1x10 by bar UE support and 3 cones By bar UE support 3 cones 10x 1x10 ea  by bar     Side steps on foam   x3 laps  x3 laps  x3 laps  x3 laps  x3 laps X 3 laps X 3 laps     Heel toe walk on foam   x3 laps  x3 laps  x3 laps  x3 laps  x3 laps X 3 laps X 3 laps     EC stance with perturbations        3x30\"     Rocker board A/P NV x20           Ther Ex             Reverse clamshell 3x10 R peach seated c bolster  3x10 R peach seated c bolster          S/L hip abd 3x10 2#  3x10 2# 3x10 2# 3x10 2# 3x10 2#  3x10 2#     Nustep 7' L4 8' L4 7' L4 7' L4 7' L4 7' L4 L4 7' L4 9'                               Ther Activity             Sit to stands c airex 3x10 10# 3x10 10# 3x10 10# 3x10 15# 3x10 15#  3x10 15#  3x10 15# 2x10 20#     Stair ascent/descent 8x, R handrail 8x, R handrail 8x, R handrail 10x, R handrail 10x R handrail  10x R handrail  R hand Rail 10x R hand Rail 10x     SL leg press 3x10 115#; increase NV 3x10 125# 3x10 125# 3x10 125# 3x10 125# 3x10 125# 125# 3x10 125# 3x10     Lateral step up x20 8\" x20 8\" x30 8\"          RDL weight     x15 15# DB on 8\" box 2x10 15# DB on 8\" box 2x10 15# DB on 8\" box 15# DB on 8\" box 2x10 20# DB on 8\" box x15     SL RDL weight         8# KB on 14\" box  x7 R     Gait Training                                       Modalities                                                        "

## 2023-06-22 ENCOUNTER — APPOINTMENT (OUTPATIENT)
Dept: PHYSICAL THERAPY | Facility: CLINIC | Age: 58
End: 2023-06-22
Payer: COMMERCIAL

## 2023-06-26 ENCOUNTER — OFFICE VISIT (OUTPATIENT)
Dept: PHYSICAL THERAPY | Facility: CLINIC | Age: 58
End: 2023-06-26
Payer: COMMERCIAL

## 2023-06-26 DIAGNOSIS — M16.11 PRIMARY OSTEOARTHRITIS OF RIGHT HIP: ICD-10-CM

## 2023-06-26 DIAGNOSIS — Z96.641 STATUS POST RIGHT HIP REPLACEMENT: Primary | ICD-10-CM

## 2023-06-26 PROCEDURE — 97530 THERAPEUTIC ACTIVITIES: CPT

## 2023-06-26 PROCEDURE — 97112 NEUROMUSCULAR REEDUCATION: CPT

## 2023-06-26 PROCEDURE — 97110 THERAPEUTIC EXERCISES: CPT

## 2023-06-26 NOTE — PROGRESS NOTES
"Daily Note     Today's date: 2023  Patient name: Debbie Tejeda  : 1965  MRN: [id number]  Referring provider: AGATHA Sher*  Dx:   Encounter Diagnosis     ICD-10-CM    1  Status post right hip replacement  Z96 641       2  Primary osteoarthritis of right hip  M16 11                      Subjective: Pt reports some pain in his knee  Objective: See treatment diary below      Assessment: Tolerated treatment well  He struggles with single leg RDL so he used toe touch on L LE for balance assistance  Good effort noted throughout session  Improved balance with EC NBOS with perturbations today  Patient demonstrated fatigue post treatment, exhibited good technique with therapeutic exercises and would benefit from continued PT      Plan: Continue per plan of care  Precautions: R hip anterior ORTIZ , prior hx L CVA    Insurance:   CMS/ AMA POC expires PT/OT + Visit Limit?  Co-Insurance   [place] Medicare   CMS 23 BOMN No   [facility] CMS  BOMN No         Visit/Unit Tracking: FOTO 4th visit  Date 5/31 6/1 6/8 6/12 6/15 6/21 6/26          FOTO         Manuals 5/22 5/25 5/31 6/1 6/8 6/12 6/15 6/21 6/26    PROM R hip 5' w/ hip precautions np                                                  Neuro Re-Ed             Pt education     6'         Bridges c TB ER NP 2x10 GTB  2x10 GTB  2x10 GTB  2x10 GTB 2x10 GTB GTB  2x10 2x10 blue 2x10 blue    Standing march to  3x10 on foam \"\"  3x10 on foam \"\"  High knee   2x15 3\" on foam High knee   2x15 3\" on foam High knee on foam 3\" 2x15 ea NP     SLRs 3x10 2# 3x10 2# RLE, L 2x10 #0 3x10 2# RLE, L 2x10 #0 3x10 2# 3x10 2# on R 3x10 2# on R 2# AW on R 3x10 3x10 2# on R 3x10 2# on R    SLS    5x20\" 5x20\" bilat 5x20\" bilat 20' 5x B/L 20' 5x B/L 20' 5x B/L    Y-balance    1x10 by bar 1x10 by bar UE support and 3 cones 1x10 by bar UE support and 3 cones By bar UE support 3 cones 10x 1x10 ea  by bar 1x10 ea  by bar    Side steps on foam   x3 laps  x3 " "laps  x3 laps  x3 laps  x3 laps X 3 laps X 3 laps X 3 laps    Heel toe walk on foam   x3 laps  x3 laps  x3 laps  x3 laps  x3 laps X 3 laps X 3 laps X 3 laps    EC stance with perturbations        3x30\" 3x30\"    Rocker board A/P NV x20           Ther Ex             Reverse clamshell 3x10 R peach seated c bolster  3x10 R peach seated c bolster          S/L hip abd 3x10 2#  3x10 2# 3x10 2# 3x10 2# 3x10 2#  3x10 2# 3x10 2#    Nustep 7' L4 8' L4 7' L4 7' L4 7' L4 7' L4 L4 7' L4 9' L4 9'                              Ther Activity             Sit to stands c airex 3x10 10# 3x10 10# 3x10 10# 3x10 15# 3x10 15#  3x10 15#  3x10 15# 2x10 20# 2x10 0# difficult    Stair ascent/descent 8x, R handrail 8x, R handrail 8x, R handrail 10x, R handrail 10x R handrail  10x R handrail  R hand Rail 10x R hand Rail 10x R hand Rail 10x    SL leg press 3x10 115#; increase NV 3x10 125# 3x10 125# 3x10 125# 3x10 125# 3x10 125# 125# 3x10 125# 3x10 125# 3x10    Lateral step up x20 8\" x20 8\" x30 8\"          RDL weight     x15 15# DB on 8\" box 2x10 15# DB on 8\" box 2x10 15# DB on 8\" box 15# DB on 8\" box 2x10 20# DB on 8\" box x15 20# DB on 8\" box x15    SL RDL weight         8# KB on 14\" box  x7 R 8# KB on 14\" box  x7 R    Gait Training                                       Modalities                                                          "

## 2023-06-29 ENCOUNTER — APPOINTMENT (OUTPATIENT)
Dept: PHYSICAL THERAPY | Facility: CLINIC | Age: 58
End: 2023-06-29
Payer: COMMERCIAL

## 2023-07-15 LAB — COLOGUARD RESULT REPORTABLE: NORMAL

## 2023-07-27 ENCOUNTER — APPOINTMENT (OUTPATIENT)
Dept: PHYSICAL THERAPY | Facility: CLINIC | Age: 58
End: 2023-07-27
Payer: COMMERCIAL

## 2023-07-31 ENCOUNTER — RA CDI HCC (OUTPATIENT)
Dept: OTHER | Facility: HOSPITAL | Age: 58
End: 2023-07-31

## 2023-07-31 NOTE — PROGRESS NOTES
[address] [facility] coding opportunities       Chart reviewed, no opportunity found: [address]        Patients Insurance     Medicare Insurance: [employer]

## 2023-08-01 ENCOUNTER — TELEPHONE (OUTPATIENT)
Dept: FAMILY MEDICINE CLINIC | Facility: CLINIC | Age: 58
End: 2023-08-01

## 2023-08-02 ENCOUNTER — LAB (OUTPATIENT)
Dept: LAB | Facility: CLINIC | Age: 58
End: 2023-08-02

## 2023-08-02 ENCOUNTER — EVALUATION (OUTPATIENT)
Dept: PHYSICAL THERAPY | Facility: CLINIC | Age: 58
End: 2023-08-02
Payer: COMMERCIAL

## 2023-08-02 DIAGNOSIS — R10.9 FLANK PAIN: ICD-10-CM

## 2023-08-02 DIAGNOSIS — K21.9 GASTROESOPHAGEAL REFLUX DISEASE WITHOUT ESOPHAGITIS: ICD-10-CM

## 2023-08-02 DIAGNOSIS — R74.8 ELEVATED ALKALINE PHOSPHATASE LEVEL: ICD-10-CM

## 2023-08-02 DIAGNOSIS — M16.11 PRIMARY OSTEOARTHRITIS OF RIGHT HIP: ICD-10-CM

## 2023-08-02 DIAGNOSIS — Z96.641 STATUS POST RIGHT HIP REPLACEMENT: Primary | ICD-10-CM

## 2023-08-02 PROCEDURE — 97112 NEUROMUSCULAR REEDUCATION: CPT

## 2023-08-02 PROCEDURE — 97530 THERAPEUTIC ACTIVITIES: CPT

## 2023-08-02 PROCEDURE — 97110 THERAPEUTIC EXERCISES: CPT

## 2023-08-02 NOTE — LETTER
2023    Kalli Garrett MD  [address] [facility] [address]    Patient: Krish Terrazas   YOB: 1965   Date of Visit: 2023     Encounter Diagnosis     ICD-10-CM    1. Status post right hip replacement  Z96.641       2. Primary osteoarthritis of right hip  M16.11           Dear Dr. Cintia Mishra: Thank you for your recent referral of Krish Terrazas. Please review the attached evaluation summary from [facility] recent visit. Please verify that you agree with the plan of care by signing the attached order. If you have any questions or concerns, please do not hesitate to call. I sincerely appreciate the opportunity to share in the care of one of your patients and hope to have another opportunity to work with you in the near future. Sincerely,    Boogie Cleveland, PT      Referring Provider:      I certify that I have read the below Plan of Care and certify the need for these services furnished under this plan of treatment while under my care. Kalli Garrett MD  [address] [place] [address]  Via Fax: [phone]          PT Re-Evaluation     Today's date: 2023  Patient name: Krish Terrazas  : 1965  MRN: [id number]  Referring provider: AGATHA Pelaez Ma*  Dx:   Encounter Diagnosis     ICD-10-CM    1. Status post right hip replacement  Z96.641       2. Primary osteoarthritis of right hip  M16.11           Start Time: 0930  Stop Time: 1015  Total time in clinic (min): 45 minutes    Assessment  Assessment details: Pt is a 65 y.o. male presenting to physical therapy s/p R ORTIZ with anterior approach. Upon re-evaluation, pt demonstrates continued improvements in strength and stability of his RLE. Pt also continues to report subjective improvements of pain and functional ability. Pt continues to demonstrate improvements in strength in R hip, however does demonstrate impaired stability and balance in his R leg compared to his L leg.  Pt still notes some limitations with bending forward and lifting, community ambulation, and stairs. Pt continues to utilize his [facility] for community ambulation for balance support as needed. PT POC will continue to focus on more functional movement patterns and lifting, as well as balance in order to restore stability in R leg. Pt would continue benefit from regularly attended skilled physical therapy to facilitate return to his prior level of function and improve functional mobility. Impairments: abnormal gait, abnormal or restricted ROM, activity intolerance, impaired balance, impaired physical strength, lacks appropriate home exercise program and pain with function  Functional limitations: bending forward and lifting, community ambulation, and stairs  Symptom irritability: lowUnderstanding of Dx/Px/POC: good   Prognosis: good    Goals  STG 4 - weeks  1. Patient will demonstrate increased R hip strength to 4+/5 in all planes to facilitate functional ability. - progressing  2. Patient will demonstrate ability to ambulate 500' with good stability and least restrictive device to facilitate functional ability. - met  LTG 8 - weeks  1. Patient will demonstrate decreased pain at worst with ambulation/stair negotiation to 2/10 or better to facilitate functional ability. - progressing  2. Patient will demonstrate increased FOTO score from 62 at baseline to predicted score of 80 or higher to facilitate functional ability.  - progressing     Plan  Patient would benefit from: skilled physical therapy  Planned modality interventions: cryotherapy, thermotherapy: hydrocollator packs and unattended electrical stimulation  Planned therapy interventions: balance, functional ROM exercises, flexibility, gait training, home exercise program, joint mobilization, manual therapy, massage, neuromuscular re-education, patient education, strengthening, stretching, therapeutic activities and therapeutic exercise  Frequency: 2x week  Duration in weeks: 8  Plan of Care beginning date: 2023  Plan of Care expiration date: 2023  Treatment plan discussed with: patient        Subjective Evaluation    History of Present Illness  Mechanism of injury: surgery  Mechanism of injury: Pt is s/p R anterior approach ORTIZ on 23. Pt reports prior hx of L ORTIZ which made his hips uneven and placed stress on his R hip. Pt now reports his hips feel even. Pt attended inpatient rehab and progressed well, now is at home and referred for outpatient PT. Pt reports chronic hx of CVAs affecting his L side, notes most recent one was in 2016 and continues to have weakness on L side of body. Pt using SBQC now in R hand since his R hip surgery. Pt reports his hip precautions are to not swing his leg behind him too fast, watching how he bends over, and not crossing his legs. Upon re-evaluation, pt reports overall improvement of function, with improvements of walking, transfers, and stairs. Pt feels his strength is now better than it was prior to surgery. Pt reports significant improvement of pain, still gets occasional pain with bending over or walking too long. Pt does report feeling slightly unsteady on his feet, drifts slightly when walking, and uses SBQC when in the community. Re-evaluation 23:  Patient reports to physical therapy today for the first time since . Patient reports feeling improved ROM and strength of his R hip since beginning therapy, but notes he still feels weakness of his LE's and unsteadiness on his feet. Patient notes continued difficulties with stair negotiation.    Patient Goals  Patient goals for therapy: decreased pain and increased strength  Patient goal: To walk without the cane and be able to do stairs normally   Pain  Current pain ratin  At best pain ratin  At worst pain ratin  Location: Anterior and lateral R hip  Quality: discomfort  Relieving factors: relaxation  Aggravating factors: stair climbing, walking and sitting  Progression: improved          Objective     Active Range of Motion     Right Hip   Flexion: 104 degrees   Abduction: 25 degrees     Passive Range of Motion     Right Hip   Flexion: 110 degrees     Strength/Myotome Testing     Left Hip   Planes of Motion   Flexion: 3+  Extension: 4  Abduction: 4-  External rotation: 4  Internal rotation: 4-    Right Hip   Planes of Motion   Flexion: 4+  Extension: 4+  Abduction: 4+  External rotation: 4  Internal rotation: 4+    Left Knee   Flexion: 4-  Extension: 4-    Right Knee   Flexion: 5  Extension: 5    Left Ankle/Foot   Dorsiflexion: 4-  Plantar flexion: 4-    Right Ankle/Foot   Dorsiflexion: 5  Plantar flexion: 4    Ambulation   Weight-Bearing Status   Assistive device used: small base quad cane    Additional Weight-Bearing Status Details  Ambulates without AD in the house. Uses QC for community ambulation. Ambulation: Stairs   Ascend stairs: independent  Pattern: reciprocal  Descend stairs: independent  Pattern: reciprocal    Observational Gait   Decreased walking speed and stride length. Additional Observational Gait Details  Ambulates with SBQC in RUE, swings RUE concurrently with RLE    Functional Assessment        Single Leg Stance   Left: 13 seconds  Right: 9 seconds            Precautions: R hip anterior ORTIZ 2/8, prior hx L CVA    Insurance:    CMS/ AMA POC expires PT/OT + Visit Limit?  Co-Insurance   [employer] Medicare   CMS 9/27/23 BOMN No   [facility] CMS   BOMN No            Visit/Unit Tracking: FOTO 4th visit  Date 5/31 6/1 6/8 6/12 6/15 6/21 6/26  8/2              FOTO done 8/2:            Manuals 5/22 5/25 5/31 6/1 6/8 6/12 6/15 6/21 6/26 8/2   PROM R hip 5' w/ hip precautions np                                                  Neuro Re-Ed             Pt education     6'         Bridges c TB ER NP 2x10 GTB  2x10 GTB  2x10 GTB  2x10 GTB 2x10 GTB GTB  2x10 2x10 blue 2x10 blue 2x10 grn   Standing march to 90 3x10 on foam ""  3x10 on foam ""  High knee   2x15 3" on foam High knee   2x15 3" on foam High knee on foam 3" 2x15 ea NP     SLRs 3x10 2# 3x10 2# RLE, L 2x10 #0 3x10 2# RLE, L 2x10 #0 3x10 2# 3x10 2# on R 3x10 2# on R 2# AW on R 3x10 3x10 2# on R 3x10 2# on R 2x10 2# b/l    SLS    5x20" 5x20" bilat 5x20" bilat 20' 5x B/L 20' 5x B/L 20' 5x B/L    Y-balance    1x10 by bar 1x10 by bar UE support and 3 cones 1x10 by bar UE support and 3 cones By bar UE support 3 cones 10x 1x10 ea. by bar 1x10 ea. by bar    Side steps on foam   x3 laps  x3 laps  x3 laps  x3 laps  x3 laps X 3 laps X 3 laps X 3 laps    Heel toe walk on foam   x3 laps  x3 laps  x3 laps  x3 laps  x3 laps X 3 laps X 3 laps X 3 laps    EC stance with perturbations        3x30" 3x30"    Infotrieve board A/P NV x20           Ther Ex             Reverse clamshell 3x10 R peach seated c bolster  3x10 R peach seated c bolster          S/L hip abd 3x10 2#  3x10 2# 3x10 2# 3x10 2# 3x10 2#  3x10 2# 3x10 2# 2x10 2#   Nustep 7' L4 8' L4 7' L4 7' L4 7' L4 7' L4 L4 7' L4 9' L4 9' L4 9'                             Ther Activity             Sit to stands c airex 3x10 10# 3x10 10# 3x10 10# 3x10 15# 3x10 15#  3x10 15#  3x10 15# 2x10 20# 2x10 0# difficult 2x10 0#   Stair ascent/descent 8x, R handrail 8x, R handrail 8x, R handrail 10x, R handrail 10x R handrail  10x R handrail  R hand Rail 10x R hand Rail 10x R hand Rail 10x R hand Rail 10x   SL leg press 3x10 115#; increase NV 3x10 125# 3x10 125# 3x10 125# 3x10 125# 3x10 125# 125# 3x10 125# 3x10 125# 3x10 125# 3x10   Lateral step up x20 8" x20 8" x30 8"          RDL weight     x15 15# DB on 8" box 2x10 15# DB on 8" box 2x10 15# DB on 8" box 15# DB on 8" box 2x10 20# DB on 8" box x15 20# DB on 8" box x15 NV   SL RDL weight         8# KB on 14" box  x7 R 8# KB on 14" box  x7 R 8# KB on 14" box  x7 R   Gait Training                                       Modalities                                               Attestation signed by Isabel Max Petrona, PT at 8/2/2023  4:48 PM:  I supervised the visit. We discussed the case to ensure appropriate continuation and progression of care and I reviewed the documentation.

## 2023-08-02 NOTE — PROGRESS NOTES
PT Re-Evaluation     Today's date: 2023  Patient name: Jaz Vaca  : 1965  MRN: [id number]  Referring provider: Ponciano Ahumada, PA*  Dx:   Encounter Diagnosis     ICD-10-CM    1. Status post right hip replacement  Z96.641       2. Primary osteoarthritis of right hip  M16.11           Start Time: 0930  Stop Time: 1015  Total time in clinic (min): 45 minutes    Assessment  Assessment details: Pt is a 65 y.o. male presenting to physical therapy s/p R ORTIZ with anterior approach. Upon re-evaluation, pt demonstrates continued improvements in strength and stability of his RLE. Pt also continues to report subjective improvements of pain and functional ability. Pt continues to demonstrate improvements in strength in R hip, however does demonstrate impaired stability and balance in his R leg compared to his L leg. Pt still notes some limitations with bending forward and lifting, community ambulation, and stairs. Pt continues to utilize his [email or web address] [facility] for community ambulation for balance support as needed. PT POC will continue to focus on more functional movement patterns and lifting, as well as balance in order to restore stability in R leg. Pt would continue benefit from regularly attended skilled physical therapy to facilitate return to his prior level of function and improve functional mobility. Impairments: abnormal gait, abnormal or restricted ROM, activity intolerance, impaired balance, impaired physical strength, lacks appropriate home exercise program and pain with function  Functional limitations: bending forward and lifting, community ambulation, and stairs  Symptom irritability: lowUnderstanding of Dx/Px/POC: good   Prognosis: good    Goals  STG 4 - weeks  1. Patient will demonstrate increased R hip strength to 4+/5 in all planes to facilitate functional ability. - progressing  2.  Patient will demonstrate ability to ambulate 500' with good stability and least restrictive device to facilitate functional ability. - met  LTG 8 - weeks  1. Patient will demonstrate decreased pain at worst with ambulation/stair negotiation to 2/10 or better to facilitate functional ability. - progressing  2. Patient will demonstrate increased FOTO score from 62 at baseline to predicted score of 80 or higher to facilitate functional ability. - progressing     Plan  Patient would benefit from: skilled physical therapy  Planned modality interventions: cryotherapy, thermotherapy: hydrocollator packs and unattended electrical stimulation  Planned therapy interventions: balance, functional ROM exercises, flexibility, gait training, home exercise program, joint mobilization, manual therapy, massage, neuromuscular re-education, patient education, strengthening, stretching, therapeutic activities and therapeutic exercise  Frequency: 2x week  Duration in weeks: 8  Plan of Care beginning date: 8/2/2023  Plan of Care expiration date: 9/27/2023  Treatment plan discussed with: patient        Subjective Evaluation    History of Present Illness  Mechanism of injury: surgery  Mechanism of injury: Pt is s/p R anterior approach ORTIZ on 2/8/23. Pt reports prior hx of L ORTIZ which made his hips uneven and placed stress on his R hip. Pt now reports his hips feel even. Pt attended inpatient rehab and progressed well, now is at home and referred for outpatient PT. Pt reports chronic hx of CVAs affecting his L side, notes most recent one was in 2016 and continues to have weakness on L side of body. Pt using SBQC now in R hand since his R hip surgery. Pt reports his hip precautions are to not swing his leg behind him too fast, watching how he bends over, and not crossing his legs. Upon re-evaluation, pt reports overall improvement of function, with improvements of walking, transfers, and stairs. Pt feels his strength is now better than it was prior to surgery.  Pt reports significant improvement of pain, still gets occasional pain with bending over or walking too long. Pt does report feeling slightly unsteady on his feet, drifts slightly when walking, and uses SBQC when in the community. Re-evaluation 23:  Patient reports to physical therapy today for the first time since . Patient reports feeling improved ROM and strength of his R hip since beginning therapy, but notes he still feels weakness of his LE's and unsteadiness on his feet. Patient notes continued difficulties with stair negotiation. Patient Goals  Patient goals for therapy: decreased pain and increased strength  Patient goal: To walk without the cane and be able to do stairs normally   Pain  Current pain ratin  At best pain ratin  At worst pain ratin  Location: Anterior and lateral R hip  Quality: discomfort  Relieving factors: relaxation  Aggravating factors: stair climbing, walking and sitting  Progression: improved          Objective     Active Range of Motion     Right Hip   Flexion: 104 degrees   Abduction: 25 degrees     Passive Range of Motion     Right Hip   Flexion: 110 degrees     Strength/Myotome Testing     Left Hip   Planes of Motion   Flexion: 3+  Extension: 4  Abduction: 4-  External rotation: 4  Internal rotation: 4-    Right Hip   Planes of Motion   Flexion: 4+  Extension: 4+  Abduction: 4+  External rotation: 4  Internal rotation: 4+    Left Knee   Flexion: 4-  Extension: 4-    Right Knee   Flexion: 5  Extension: 5    Left Ankle/Foot   Dorsiflexion: 4-  Plantar flexion: 4-    Right Ankle/Foot   Dorsiflexion: 5  Plantar flexion: 4    Ambulation   Weight-Bearing Status   Assistive device used: small base quad cane    Additional Weight-Bearing Status Details  Ambulates without AD in the house. Uses QC for community ambulation. Ambulation: Stairs   Ascend stairs: independent  Pattern: reciprocal  Descend stairs: independent  Pattern: reciprocal    Observational Gait   Decreased walking speed and stride length.      Additional Observational Gait Details  Ambulates with SBQC in RUE, swings RUE concurrently with RLE    Functional Assessment        Single Leg Stance   Left: 13 seconds  Right: 9 seconds             Precautions: R hip anterior ORTIZ /, prior hx L CVA    Insurance:    CMS/ AMA POC expires PT/OT + Visit Limit?  Co-Insurance   [employer] Medicare   CMS 23 BOMN No   [facility] CMS   BOMN No            Visit/[facility] [place] 4th visit  Date 5/31 6/1 6/8 6/12 6/15 6/21 6/26  8/2              FOTO done :            Manuals 5/22 5/25 5/31 6/1 6/8 6/12 6/15 6/21 6/26 8/2   PROM R hip 5' w/ hip precautions np                                                  Neuro Re-Ed             Pt education     6'         Bridges c TB ER NP 2x10 GTB  2x10 GTB  2x10 GTB  2x10 GTB 2x10 GTB GTB  2x10 2x10 blue 2x10 blue 2x10 grn   Standing march to  3x10 on foam ""  3x10 on foam ""  High knee   2x15 3" on foam High knee   2x15 3" on foam High knee on foam 3" 2x15 ea NP     SLRs 3x10 2# 3x10 2# RLE, L 2x10 #0 3x10 2# RLE, L 2x10 #0 3x10 2# 3x10 2# on R 3x10 2# on R 2# AW on R 3x10 3x10 2# on R 3x10 2# on R 2x10 2# b/l    SLS    5x20" 5x20" bilat 5x20" bilat 20' 5x B/L 20' 5x B/L 20' 5x B/L    Y-balance    1x10 by bar 1x10 by bar UE support and 3 cones 1x10 by bar UE support and 3 cones By bar UE support 3 cones 10x 1x10 ea. by bar 1x10 ea. by bar    Side steps on foam   x3 laps  x3 laps  x3 laps  x3 laps  x3 laps X 3 laps X 3 laps X 3 laps    Heel toe walk on foam   x3 laps  x3 laps  x3 laps  x3 laps  x3 laps X 3 laps X 3 laps X 3 laps    EC stance with perturbations        3x30" 3x30"    Rocker board A/P NV x20           Ther Ex             Reverse clamshell 3x10 R peach seated c bolster  3x10 R peach seated c bolster          S/L hip abd 3x10 2#  3x10 2# 3x10 2# 3x10 2# 3x10 2#  3x10 2# 3x10 2# 2x10 2#   Nustep 7' L4 8' L4 7' L4 7' L4 7' L4 7' L4 L4 7' L4 9' L4 9' L4 9'                             Ther Activity             Sit to stands c airex 3x10 10# 3x10 10# 3x10 10# 3x10 15# 3x10 15#  3x10 15#  3x10 15# 2x10 20# 2x10 0# difficult 2x10 0#   Stair ascent/descent 8x, R handrail 8x, R handrail 8x, R handrail 10x, R handrail 10x R handrail  10x R handrail  R hand Rail 10x R hand Rail 10x R hand Rail 10x R hand Rail 10x   SL leg press 3x10 115#; increase NV 3x10 125# 3x10 125# 3x10 125# 3x10 125# 3x10 125# 125# 3x10 125# 3x10 125# 3x10 125# 3x10   Lateral step up x20 8" x20 8" x30 8"          RDL weight     x15 15# DB on 8" box 2x10 15# DB on 8" box 2x10 15# DB on 8" box 15# DB on 8" box 2x10 20# DB on 8" box x15 20# DB on 8" box x15 NV   SL RDL weight         8# KB on 14" box  x7 R 8# KB on 14" box  x7 R 8# KB on 14" box  x7 R   Gait Training                                       Modalities

## 2023-08-02 NOTE — LETTER
August 3, 2023    Luis Noble PA-C  [address]    Patient: Cuco Ryan   YOB: 1965   Date of Visit: 2023     Encounter Diagnosis     ICD-10-CM    1. Status post right hip replacement  Z96.641       2. Primary osteoarthritis of right hip  M16.11           Dear Dr. Sony Valles: Thank you for your recent referral of Cuco Ryan. Please review the attached evaluation summary from [facility] recent visit. Please verify that you agree with the plan of care by signing the attached order. If you have any questions or concerns, please do not hesitate to call. I sincerely appreciate the opportunity to share in the care of one of your patients and hope to have another opportunity to work with you in the near future. Sincerely,    Trever Lyman, PT      Referring Provider:      I certify that I have read the below Plan of Care and certify the need for these services furnished under this plan of treatment while under my care. Luis Noble PA-C  [address] [place], [place] [address]  [address]  Via Fax: [phone]          PT Re-Evaluation     Today's date: 2023  Patient name: Cuco Ryan  : 1965  MRN: [id number]  Referring provider: AGATHA Patterson*  Dx:   Encounter Diagnosis     ICD-10-CM    1. Status post right hip replacement  Z96.641       2. Primary osteoarthritis of right hip  M16.11           Start Time: 930  Stop Time: 1015  Total time in clinic (min): 45 minutes    Assessment  Assessment details: Pt is a 65 y.o. male presenting to physical therapy s/p R ORTIZ with anterior approach. Upon re-evaluation, pt demonstrates continued improvements in strength and stability of his RLE. Pt also continues to report subjective improvements of pain and functional ability. Pt continues to demonstrate improvements in strength in R hip, however does demonstrate impaired stability and balance in his R leg compared to his L leg.  Pt still notes some limitations with bending forward and lifting, community ambulation, and stairs. Pt continues to utilize his [facility] for community ambulation for balance support as needed. PT POC will continue to focus on more functional movement patterns and lifting, as well as balance in order to restore stability in R leg. Pt would continue benefit from regularly attended skilled physical therapy to facilitate return to his prior level of function and improve functional mobility. Impairments: abnormal gait, abnormal or restricted ROM, activity intolerance, impaired balance, impaired physical strength, lacks appropriate home exercise program and pain with function  Functional limitations: bending forward and lifting, community ambulation, and stairs  Symptom irritability: lowUnderstanding of Dx/Px/POC: good   Prognosis: good    Goals  STG 4 - weeks  1. Patient will demonstrate increased R hip strength to 4+/5 in all planes to facilitate functional ability. - progressing  2. Patient will demonstrate ability to ambulate 500' with good stability and least restrictive device to facilitate functional ability. - met  LTG 8 - weeks  1. Patient will demonstrate decreased pain at worst with ambulation/stair negotiation to 2/10 or better to facilitate functional ability. - progressing  2. Patient will demonstrate increased FOTO score from 62 at baseline to predicted score of 80 or higher to facilitate functional ability.  - progressing     Plan  Patient would benefit from: skilled physical therapy  Planned modality interventions: cryotherapy, thermotherapy: hydrocollator packs and unattended electrical stimulation  Planned therapy interventions: balance, functional ROM exercises, flexibility, gait training, home exercise program, joint mobilization, manual therapy, massage, neuromuscular re-education, patient education, strengthening, stretching, therapeutic activities and therapeutic exercise  Frequency: 2x week  Duration in weeks: 8  Plan of Care beginning date: 2023  Plan of Care expiration date: 2023  Treatment plan discussed with: patient        Subjective Evaluation    History of Present Illness  Mechanism of injury: surgery  Mechanism of injury: Pt is s/p R anterior approach ORTIZ on 23. Pt reports prior hx of L ORTIZ which made his hips uneven and placed stress on his R hip. Pt now reports his hips feel even. Pt attended inpatient rehab and progressed well, now is at home and referred for outpatient PT. Pt reports chronic hx of CVAs affecting his L side, notes most recent one was in 2016 and continues to have weakness on L side of body. Pt using SBQC now in R hand since his R hip surgery. Pt reports his hip precautions are to not swing his leg behind him too fast, watching how he bends over, and not crossing his legs. Upon re-evaluation, pt reports overall improvement of function, with improvements of walking, transfers, and stairs. Pt feels his strength is now better than it was prior to surgery. Pt reports significant improvement of pain, still gets occasional pain with bending over or walking too long. Pt does report feeling slightly unsteady on his feet, drifts slightly when walking, and uses SBQC when in the community. Re-evaluation 23:  Patient reports to physical therapy today for the first time since . Patient reports feeling improved ROM and strength of his R hip since beginning therapy, but notes he still feels weakness of his LE's and unsteadiness on his feet. Patient notes continued difficulties with stair negotiation.    Patient Goals  Patient goals for therapy: decreased pain and increased strength  Patient goal: To walk without the cane and be able to do stairs normally   Pain  Current pain ratin  At best pain ratin  At worst pain ratin  Location: Anterior and lateral R hip  Quality: discomfort  Relieving factors: relaxation  Aggravating factors: stair climbing, walking and sitting  Progression: improved          Objective     Active Range of Motion     Right Hip   Flexion: 104 degrees   Abduction: 25 degrees     Passive Range of Motion     Right Hip   Flexion: 110 degrees     Strength/Myotome Testing     Left Hip   Planes of Motion   Flexion: 3+  Extension: 4  Abduction: 4-  External rotation: 4  Internal rotation: 4-    Right Hip   Planes of Motion   Flexion: 4+  Extension: 4+  Abduction: 4+  External rotation: 4  Internal rotation: 4+    Left Knee   Flexion: 4-  Extension: 4-    Right Knee   Flexion: 5  Extension: 5    Left Ankle/Foot   Dorsiflexion: 4-  Plantar flexion: 4-    Right Ankle/Foot   Dorsiflexion: 5  Plantar flexion: 4    Ambulation   Weight-Bearing Status   Assistive device used: small base quad cane    Additional Weight-Bearing Status Details  Ambulates without AD in the house. Uses QC for community ambulation. Ambulation: Stairs   Ascend stairs: independent  Pattern: reciprocal  Descend stairs: independent  Pattern: reciprocal    Observational Gait   Decreased walking speed and stride length. Additional Observational Gait Details  Ambulates with SBQC in RUE, swings RUE concurrently with RLE    Functional Assessment        Single Leg Stance   Left: 13 seconds  Right: 9 seconds            Precautions: R hip anterior ORTIZ 2/8, prior hx L CVA    Insurance:    CMS/ AMA POC expires PT/OT + Visit Limit?  Co-Insurance   [employer] Medicare   CMS 9/27/23 BOMN No   [facility] CMS   BOMN No            Visit/Unit Tracking: FOTO 4th visit  Date 5/31 6/1 6/8 6/12 6/15 6/21 6/26  8/2              FOTO done 8/2:            Manuals 5/22 5/25 5/31 6/1 6/8 6/12 6/15 6/21 6/26 8/2   PROM R hip 5' w/ hip precautions np                                                  Neuro Re-Ed             Pt education     6'         Bridges c TB ER NP 2x10 GTB  2x10 GTB  2x10 GTB  2x10 GTB 2x10 GTB GTB  2x10 2x10 blue 2x10 blue 2x10 grn   Standing march to 90 3x10 on foam ""  3x10 on foam ""  High knee   2x15 3" on foam High knee   2x15 3" on foam High knee on foam 3" 2x15 ea NP     SLRs 3x10 2# 3x10 2# RLE, L 2x10 #0 3x10 2# RLE, L 2x10 #0 3x10 2# 3x10 2# on R 3x10 2# on R 2# AW on R 3x10 3x10 2# on R 3x10 2# on R 2x10 2# b/l    SLS    5x20" 5x20" bilat 5x20" bilat 20' 5x B/L 20' 5x B/L 20' 5x B/L    Y-balance    1x10 by bar 1x10 by bar UE support and 3 cones 1x10 by bar UE support and 3 cones By bar UE support 3 cones 10x 1x10 ea. by bar 1x10 ea. by bar    Side steps on foam   x3 laps  x3 laps  x3 laps  x3 laps  x3 laps X 3 laps X 3 laps X 3 laps    Heel toe walk on foam   x3 laps  x3 laps  x3 laps  x3 laps  x3 laps X 3 laps X 3 laps X 3 laps    EC stance with perturbations        3x30" 3x30"    Zal[employer]a board A/P NV x20           Ther Ex             Reverse clamshell 3x10 R peach seated c bolster  3x10 R peach seated c bolster          S/L hip abd 3x10 2#  3x10 2# 3x10 2# 3x10 2# 3x10 2#  3x10 2# 3x10 2# 2x10 2#   Nustep 7' L4 8' L4 7' L4 7' L4 7' L4 7' L4 L4 7' L4 9' L4 9' L4 9'                             Ther Activity             Sit to stands c airex 3x10 10# 3x10 10# 3x10 10# 3x10 15# 3x10 15#  3x10 15#  3x10 15# 2x10 20# 2x10 0# difficult 2x10 0#   Stair ascent/descent 8x, R handrail 8x, R handrail 8x, R handrail 10x, R handrail 10x R handrail  10x R handrail  R hand Rail 10x R hand Rail 10x R hand Rail 10x R hand Rail 10x   SL leg press 3x10 115#; increase NV 3x10 125# 3x10 125# 3x10 125# 3x10 125# 3x10 125# 125# 3x10 125# 3x10 125# 3x10 125# 3x10   Lateral step up x20 8" x20 8" x30 8"          RDL weight     x15 15# DB on 8" box 2x10 15# DB on 8" box 2x10 15# DB on 8" box 15# DB on 8" box 2x10 20# DB on 8" box x15 20# DB on 8" box x15 NV   SL RDL weight         8# KB on 14" box  x7 R 8# KB on 14" box  x7 R 8# KB on 14" box  x7 R   Gait Training                                       Modalities                                               Attestation signed by Suzette Mart Petrona, PT at 8/2/2023  4:48 PM:  I supervised the visit. We discussed the case to ensure appropriate continuation and progression of care and I reviewed the documentation.

## 2023-08-03 ENCOUNTER — OFFICE VISIT (OUTPATIENT)
Dept: PHYSICAL THERAPY | Facility: CLINIC | Age: 58
End: 2023-08-03
Payer: COMMERCIAL

## 2023-08-03 DIAGNOSIS — M16.11 PRIMARY OSTEOARTHRITIS OF RIGHT HIP: ICD-10-CM

## 2023-08-03 DIAGNOSIS — Z96.641 STATUS POST RIGHT HIP REPLACEMENT: Primary | ICD-10-CM

## 2023-08-03 PROCEDURE — 97530 THERAPEUTIC ACTIVITIES: CPT

## 2023-08-03 PROCEDURE — 97112 NEUROMUSCULAR REEDUCATION: CPT

## 2023-08-03 PROCEDURE — 97110 THERAPEUTIC EXERCISES: CPT

## 2023-08-03 NOTE — PROGRESS NOTES
Daily Note     Today's date: 8/3/2023  Patient name: Barry Rivera  : 1965  MRN: [id number]  Referring provider: AGATHA Chanel*  Dx:   Encounter Diagnosis     ICD-10-CM    1. Status post right hip replacement  Z96.641       2. Primary osteoarthritis of right hip  M16.11                      Subjective: Pt reports his knees are bothering him today, they swelled up. Objective: See treatment diary below      Assessment: Tolerated treatment well. He tolerates exercises as outlined below. Good effort noted with all exercises as stated. No complaints verbalized during session. Continued to re-integrate pt back into his exercise program from before his trip out of state. His balance continues to be limited posing a challenge with SL RDL. Patient demonstrated fatigue post treatment, exhibited good technique with therapeutic exercises and would benefit from continued PT      Plan: Continue per plan of care. Precautions: R hip anterior ORTIZ , prior hx L CVA    Insurance:    CMS/ AMA POC expires PT/OT + Visit Limit?  Co-Insurance   [employer] Medicare   CMS 23 BOMN No   [facility] CMS   BOMN No            Visit/Unit Tracking: FOTO 4th visit  Date 5/31 6/1 6/8 6/12 6/15 6/21 6/26  8/2 8/3            FOTO done :            Manuals 8/3     6/12 6/15 6/21 6/26 8/2   PROM R hip                                                    Neuro Re-Ed             Pt education              Bridges c TB ER 2x10 blue     2x10 GTB GTB  2x10 2x10 blue 2x10 blue 2x10 grn   Standing march to       High knee   2x15 3" on foam High knee on foam 3" 2x15 ea NP     SLRs 2x10 L, 3x10 R  2# b/l      3x10 2# on R 2# AW on R 3x10 3x10 2# on R 3x10 2# on R 2x10 2# b/l    SLS      5x20" bilat 20' 5x B/L 20' 5x B/L 20' 5x B/L    Y-balance      1x10 by bar UE support and 3 cones By bar UE support 3 cones 10x 1x10 ea. by bar 1x10 ea. by bar    Side steps on foam  3 laps     x3 laps X 3 laps X 3 laps X 3 laps    Heel toe walk on foam  3 laps     x3 laps X 3 laps X 3 laps X 3 laps    EC stance with perturbations        3x30" 3x30"    Rocker board A/P             Ther Ex             Reverse clamshell             S/L hip abd 2x10 2#     3x10 2#  3x10 2# 3x10 2# 2x10 2#   Nustep L3 9'     7' L4 L4 7' L4 9' L4 9' L4 9'                             Ther Activity             Sit to stands c airex 2x10 0#     3x10 15#  3x10 15# 2x10 20# 2x10 0# difficult 2x10 0#   Stair ascent/descent R hand Rail 10x     10x R handrail  R hand Rail 10x R hand Rail 10x R hand Rail 10x R hand Rail 10x   SL leg press 125# 3x10     3x10 125# 125# 3x10 125# 3x10 125# 3x10 125# 3x10   Lateral step up             RDL weight  20# DB on 8" box x15     2x10 15# DB on 8" box 15# DB on 8" box 2x10 20# DB on 8" box x15 20# DB on 8" box x15 NV   SL RDL weight  8# KB on 14" box  x7 R       8# KB on 14" box  x7 R 8# KB on 14" box  x7 R 8# KB on 14" box  x7 R   Gait Training                                       Modalities

## 2023-08-07 ENCOUNTER — LAB (OUTPATIENT)
Dept: LAB | Facility: HOSPITAL | Age: 58
End: 2023-08-07
Payer: COMMERCIAL

## 2023-08-07 ENCOUNTER — HOSPITAL ENCOUNTER (OUTPATIENT)
Dept: ULTRASOUND IMAGING | Facility: HOSPITAL | Age: 58
End: 2023-08-07
Payer: COMMERCIAL

## 2023-08-07 ENCOUNTER — HOSPITAL ENCOUNTER (OUTPATIENT)
Dept: ULTRASOUND IMAGING | Facility: HOSPITAL | Age: 58
Discharge: HOME | End: 2023-08-07
Payer: COMMERCIAL

## 2023-08-07 ENCOUNTER — ANTICOAG VISIT (OUTPATIENT)
Dept: FAMILY MEDICINE CLINIC | Facility: CLINIC | Age: 58
End: 2023-08-07

## 2023-08-07 DIAGNOSIS — B20 HIV DISEASE (HCC): ICD-10-CM

## 2023-08-07 DIAGNOSIS — R74.8 ELEVATED ALKALINE PHOSPHATASE LEVEL: ICD-10-CM

## 2023-08-07 DIAGNOSIS — R10.9 FLANK PAIN: ICD-10-CM

## 2023-08-07 DIAGNOSIS — K21.9 GASTROESOPHAGEAL REFLUX DISEASE WITHOUT ESOPHAGITIS: ICD-10-CM

## 2023-08-07 LAB
ALBUMIN SERPL BCP-MCNC: 4.6 G/DL (ref 3.5–5)
ALP SERPL-CCNC: 129 U/L (ref 34–104)
ALT SERPL W P-5'-P-CCNC: 11 U/L (ref 7–52)
ANION GAP SERPL CALCULATED.3IONS-SCNC: 7 MMOL/L
AST SERPL W P-5'-P-CCNC: 18 U/L (ref 13–39)
BASOPHILS # BLD AUTO: 0.05 THOUSANDS/ÂΜL (ref 0–0.1)
BASOPHILS NFR BLD AUTO: 1 % (ref 0–1)
BILIRUB SERPL-MCNC: 0.71 MG/DL (ref 0.2–1)
BILIRUB UR QL STRIP: NEGATIVE
BUN SERPL-MCNC: 7 MG/DL (ref 5–25)
CALCIUM SERPL-MCNC: 9.7 MG/DL (ref 8.4–10.2)
CHLORIDE SERPL-SCNC: 104 MMOL/L (ref 96–108)
CLARITY UR: CLEAR
CO2 SERPL-SCNC: 29 MMOL/L (ref 21–32)
COLOR UR: NORMAL
CREAT SERPL-MCNC: 0.73 MG/DL (ref 0.6–1.3)
EOSINOPHIL # BLD AUTO: 0.12 THOUSAND/ÂΜL (ref 0–0.61)
EOSINOPHIL NFR BLD AUTO: 2 % (ref 0–6)
ERYTHROCYTE [DISTWIDTH] IN BLOOD BY AUTOMATED COUNT: 14.6 % (ref 11.6–15.1)
GFR SERPL CREATININE-BSD FRML MDRD: 102 ML/MIN/1.73SQ M
GGT SERPL-CCNC: 33 U/L (ref 5–85)
GLUCOSE P FAST SERPL-MCNC: 85 MG/DL (ref 65–99)
GLUCOSE UR STRIP-MCNC: NEGATIVE MG/DL
HCT VFR BLD AUTO: 34.4 % (ref 36.5–49.3)
HGB BLD-MCNC: 12.1 G/DL (ref 12–17)
HGB UR QL STRIP.AUTO: NEGATIVE
IMM GRANULOCYTES # BLD AUTO: 0.01 THOUSAND/UL (ref 0–0.2)
IMM GRANULOCYTES NFR BLD AUTO: 0 % (ref 0–2)
INR PPP: 2.73 (ref 0.84–1.19)
KETONES UR STRIP-MCNC: NEGATIVE MG/DL
LEUKOCYTE ESTERASE UR QL STRIP: NEGATIVE
LYMPHOCYTES # BLD AUTO: 1.79 THOUSANDS/ÂΜL (ref 0.6–4.47)
LYMPHOCYTES NFR BLD AUTO: 33 % (ref 14–44)
MCH RBC QN AUTO: 30.6 PG (ref 26.8–34.3)
MCHC RBC AUTO-ENTMCNC: 35.2 G/DL (ref 31.4–37.4)
MCV RBC AUTO: 87 FL (ref 82–98)
MONOCYTES # BLD AUTO: 0.36 THOUSAND/ÂΜL (ref 0.17–1.22)
MONOCYTES NFR BLD AUTO: 7 % (ref 4–12)
NEUTROPHILS # BLD AUTO: 3.03 THOUSANDS/ÂΜL (ref 1.85–7.62)
NEUTS SEG NFR BLD AUTO: 57 % (ref 43–75)
NITRITE UR QL STRIP: NEGATIVE
NRBC BLD AUTO-RTO: 0 /100 WBCS
PH UR STRIP.AUTO: 6.5 [PH]
PLATELET # BLD AUTO: 282 THOUSANDS/UL (ref 149–390)
PMV BLD AUTO: 9.5 FL (ref 8.9–12.7)
POTASSIUM SERPL-SCNC: 3 MMOL/L (ref 3.5–5.3)
PROT SERPL-MCNC: 8.2 G/DL (ref 6.4–8.4)
PROT UR STRIP-MCNC: NEGATIVE MG/DL
PROTHROMBIN TIME: 28.3 SECONDS (ref 11.6–14.5)
RBC # BLD AUTO: 3.96 MILLION/UL (ref 3.88–5.62)
SODIUM SERPL-SCNC: 140 MMOL/L (ref 135–147)
SP GR UR STRIP.AUTO: 1.01 (ref 1–1.03)
UROBILINOGEN UR STRIP-ACNC: <2 MG/DL
WBC # BLD AUTO: 5.36 THOUSAND/UL (ref 4.31–10.16)

## 2023-08-07 PROCEDURE — 82977 ASSAY OF GGT: CPT

## 2023-08-07 PROCEDURE — 81003 URINALYSIS AUTO W/O SCOPE: CPT

## 2023-08-07 PROCEDURE — 88112 CYTOPATH CELL ENHANCE TECH: CPT | Performed by: PATHOLOGY

## 2023-08-07 PROCEDURE — 85025 COMPLETE CBC W/AUTO DIFF WBC: CPT

## 2023-08-07 PROCEDURE — 76700 US EXAM ABDOM COMPLETE: CPT

## 2023-08-07 PROCEDURE — 80053 COMPREHEN METABOLIC PANEL: CPT

## 2023-08-07 NOTE — PROGRESS NOTES
It appears as he is seeing [facility] and if that is the case they will have resume dosing   Sent message to confirm for sure

## 2023-08-08 ENCOUNTER — TELEMEDICINE (OUTPATIENT)
Dept: FAMILY MEDICINE CLINIC | Facility: CLINIC | Age: 58
End: 2023-08-08
Payer: COMMERCIAL

## 2023-08-08 DIAGNOSIS — B20 HIV DISEASE (HCC): ICD-10-CM

## 2023-08-08 DIAGNOSIS — R53.1 RIGHT SIDED WEAKNESS: ICD-10-CM

## 2023-08-08 DIAGNOSIS — K21.9 GASTROESOPHAGEAL REFLUX DISEASE WITHOUT ESOPHAGITIS: ICD-10-CM

## 2023-08-08 DIAGNOSIS — E87.6 HYPOKALEMIA: ICD-10-CM

## 2023-08-08 DIAGNOSIS — J44.9 COPD, GROUP B, BY GOLD 2017 CLASSIFICATION (HCC): ICD-10-CM

## 2023-08-08 DIAGNOSIS — D58.2 OTHER HEMOGLOBINOPATHIES (HCC): ICD-10-CM

## 2023-08-08 DIAGNOSIS — I27.82 CHRONIC SADDLE PULMONARY EMBOLISM WITH ACUTE COR PULMONALE (HCC): ICD-10-CM

## 2023-08-08 DIAGNOSIS — I26.02 CHRONIC SADDLE PULMONARY EMBOLISM WITH ACUTE COR PULMONALE (HCC): ICD-10-CM

## 2023-08-08 DIAGNOSIS — G89.29 CHRONIC PAIN OF BOTH KNEES: Primary | ICD-10-CM

## 2023-08-08 DIAGNOSIS — M25.561 CHRONIC PAIN OF BOTH KNEES: Primary | ICD-10-CM

## 2023-08-08 DIAGNOSIS — Z79.01 LONG TERM CURRENT USE OF ANTICOAGULANT: ICD-10-CM

## 2023-08-08 DIAGNOSIS — Z12.11 SCREENING FOR COLON CANCER: ICD-10-CM

## 2023-08-08 DIAGNOSIS — M25.562 CHRONIC PAIN OF BOTH KNEES: Primary | ICD-10-CM

## 2023-08-08 DIAGNOSIS — E44.1 MILD PROTEIN-CALORIE MALNUTRITION (HCC): ICD-10-CM

## 2023-08-08 PROCEDURE — 99213 OFFICE O/P EST LOW 20 MIN: CPT | Performed by: NURSE PRACTITIONER

## 2023-08-08 RX ORDER — WARFARIN SODIUM 7.5 MG/1
7.5 TABLET ORAL
Qty: 90 TABLET | Refills: 1 | Status: SHIPPED | OUTPATIENT
Start: 2023-08-08 | End: 2023-11-06

## 2023-08-08 RX ORDER — POTASSIUM CHLORIDE 750 MG/1
10 TABLET, EXTENDED RELEASE ORAL DAILY
Qty: 30 TABLET | Refills: 1 | Status: SHIPPED | OUTPATIENT
Start: 2023-08-08 | End: 2023-09-07

## 2023-08-08 RX ORDER — TIZANIDINE 4 MG/1
4 TABLET ORAL EVERY 8 HOURS PRN
Qty: 60 TABLET | Refills: 0 | Status: SHIPPED | OUTPATIENT
Start: 2023-08-08 | End: 2023-08-29

## 2023-08-08 RX ORDER — TIZANIDINE 4 MG/1
TABLET ORAL
COMMUNITY
Start: 2023-06-05 | End: 2023-08-08 | Stop reason: SDUPTHER

## 2023-08-08 RX ORDER — MONTELUKAST SODIUM 10 MG/1
10 TABLET ORAL
Qty: 90 TABLET | Refills: 1 | Status: SHIPPED | OUTPATIENT
Start: 2023-08-08 | End: 2023-11-06

## 2023-08-08 RX ORDER — PREDNISONE 10 MG/1
TABLET ORAL
COMMUNITY
Start: 2023-06-23

## 2023-08-08 NOTE — RESULT ENCOUNTER NOTE
Potassium is low   But I see he is seeing a new provider   We will reach out and see what is going on

## 2023-08-08 NOTE — PROGRESS NOTES
Virtual Regular Visit    Verification of patient location:    Patient is located at Home in the following state in which I hold an active license [place]      Assessment/Plan:    Patient is a 72-year-old male presents  to virtually to review lab results  Underlying history of DVTs and PE he is on Coumadin long-term anticoagulation his INR is therapeutic he has been advised to continue taking the same dosage she is currently on 7.5 mg 3 times a week Monday Wednesday Friday and the rest of the days he takes half of it which is 3.25mg . Follow-up INR will be in 4 weeks. He has had increased pain to bilateral knee joints he has follow-up coming up with orthopedics. Labs showed some low potassium I have added 10 mEq of potassium daily. We will continue to follow-up and repeat repeat blood work in 4 weeks. Reviewed his medications and refills provided.    He still continues physical therapy post hip replacement    Denies any major issues     Problem List Items Addressed This Visit        Digestive    Gastroesophageal reflux disease without esophagitis    Relevant Medications    montelukast (SINGULAIR) 10 mg tablet       Respiratory    COPD, group B, by GOLD 2017 classification ([facility]ly [facility])    Relevant Medications    predniSONE 10 mg tablet    montelukast (SINGULAIR) 10 mg tablet       Cardiovascular and Mediastinum    Pulmonary embolism (Formerly [facility])    Relevant Medications    montelukast (SINGULAIR) 10 mg tablet       Nervous and Auditory    Right sided weakness    Relevant Medications    tiZANidine (ZANAFLEX) 4 mg tablet    montelukast (SINGULAIR) 10 mg tablet       Other    Anemia    Relevant Medications    montelukast (SINGULAIR) 10 mg tablet    HIV disease ([facility])    Relevant Medications    montelukast (SINGULAIR) 10 mg tablet    Protein-calorie malnutrition ([facility])    Relevant Medications    montelukast (SINGULAIR) 10 mg tablet   Other Visit Diagnoses     Chronic pain of both knees    -  Primary    Relevant Medications tiZANidine (ZANAFLEX) 4 mg tablet    Screening for colon cancer        Relevant Medications    montelukast (SINGULAIR) 10 mg tablet    Long term current use of anticoagulant        Relevant Medications    warfarin (COUMADIN) 7.5 mg tablet    Hypokalemia        Relevant Medications    potassium chloride (K-DUR,KLOR-CON) 10 mEq tablet          BMI Counseling: There is no height or weight on file to calculate BMI. The BMI is above normal. Nutrition recommendations include decreasing portion sizes, encouraging healthy choices of fruits and vegetables, decreasing fast food intake, consuming healthier snacks, limiting drinks that contain sugar, moderation in carbohydrate intake, increasing intake of lean protein, reducing intake of saturated and trans fat and reducing intake of cholesterol. Exercise recommendations include vigorous physical activity 75 minutes/week, exercising 3-5 times per week and strength training exercises. No pharmacotherapy was ordered. Patient referred to PCP. Rationale for BMI follow-up plan is due to patient being overweight or obese. Depression Screening and Follow-up Plan: Patient advised to follow-up with PCP for further management. Reason for visit is   Chief Complaint   Patient presents with   • Virtual Regular Visit        Encounter provider Thor Beckman [address] [facility]    Provider located at [address] [place] [address]  [phone]      Recent Visits  Date Type Provider Dept   08/01/23 Telephone [facility] recent visits within past 7 days and meeting all other requirements  Today's Visits  Date Type Provider Dept   08/08/23 Telemedicine Thor Beckman, [facility] today's visits and meeting all other requirements  Future Appointments  No visits were found meeting these conditions.   Showing future appointments within next 150 days and meeting all other requirements       The patient was identified by name and date of birth. Cuco Ryan was informed that this is a telemedicine visit and that the visit is being conducted through the [employer] [facility] 22 Now platform. He agrees to proceed. .  My office door was closed. No one else was in the room. He acknowledged consent and understanding of privacy and security of the video platform. The patient has agreed to participate and understands they can discontinue the visit at any time. Patient is aware this is a billable service. Subjective  Cuco Ryan is a 63 y.o. male  . Patient is a 60-year-old male presents  to virtually to review lab results  Underlying history of DVTs and PE he is on Coumadin long-term anticoagulation his INR is therapeutic he has been advised to continue taking the same dosage she is currently on 7.5 mg 3 times a week Monday Wednesday Friday and the rest of the days he takes half of it which is 3.25mg . Follow-up INR will be in 4 weeks. He has had increased pain to bilateral knee joints he has follow-up coming up with orthopedics. Labs showed some low potassium I have added 10 mEq of potassium daily. We will continue to follow-up and repeat repeat blood work in 4 weeks. Reviewed his medications and refills provided.    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)    • Closed fracture of left hip, with malunion, subsequent encounter    • Erectile dysfunction    • Hypertension    • Obstructive sleep apnea, adult    • Stroke [facility])        Past Surgical History:   Procedure Laterality Date   • HIP SURGERY     • TOTAL HIP ARTHROPLASTY Bilateral        Current Outpatient Medications   Medication Sig Dispense Refill   • montelukast (SINGULAIR) 10 mg tablet Take 1 tablet (10 mg total) by mouth daily at bedtime 90 tablet 1   • potassium chloride (K-DUR,KLOR-CON) 10 mEq tablet Take 1 tablet (10 mEq total) by mouth daily 30 tablet 1   • predniSONE 10 mg tablet Take 5 tablets by mouth for 2 days, 4 tabs for 2 days, 3 tabs for 2 days, 2 tabs for 2 days 1 tab for 2 days     • tiZANidine (ZANAFLEX) 4 mg tablet Take 1 tablet (4 mg total) by mouth every 8 (eight) hours as needed for muscle spasms for up to 21 days 60 tablet 0   • warfarin (COUMADIN) 7.5 mg tablet Take 1 tablet (7.5 mg total) by mouth daily Tues, Thurs Saturday 1/2 pill (3.25mg) on other days 90 tablet 1   • albuterol (PROVENTIL HFA,VENTOLIN HFA) 90 mcg/act inhaler Inhale     • atorvastatin (LIPITOR) 40 mg tablet Take 1 tablet (40 mg total) by mouth daily 90 tablet 1   • bictegravir-emtricitab-tenofovir alafenamide (BIKTARVY) -25 MG tablet Take 1 tablet by mouth daily with breakfast     • Darunavir-Cobicistat 800-150 MG TABS Take 1 tablet by mouth daily     • Diclofenac Sodium (VOLTAREN) 1 %      • diphenhydrAMINE (BENADRYL) 25 mg tablet Take 2 tablets (50 mg total) by mouth daily at bedtime 30 tablet 0   • fluticasone-salmeterol (Advair HFA) 230-21 MCG/ACT inhaler Inhale 2 puffs 2 (two) times a day 36 g 1   • gabapentin (NEURONTIN) 600 MG tablet Take 1 tablet (600 mg total) by mouth 3 (three) times a day 270 tablet 0   • pantoprazole (PROTONIX) 40 mg tablet Take 1 tablet (40 mg total) by mouth daily 90 tablet 1   • tadalafil (CIALIS) 20 MG tablet Take 20 mg by mouth daily as needed for erectile dysfunction     • traZODone (DESYREL) 100 mg tablet Take 1 tablet (100 mg total) by mouth daily at bedtime 90 tablet 1     No current facility-administered medications for this visit. Allergies   Allergen Reactions   • Penicillins    • Food Rash     [facility] - [id number]: pineapples   • Tomato - Food Allergy Rash       Review of Systems   Constitutional: Negative for fatigue, fever and unexpected weight change. HENT: Negative for congestion and postnasal drip. Cardiovascular: Negative for chest pain.    Musculoskeletal: Positive for arthralgias, joint swelling (b/l knee pain and swelling sees ORTHO soon) and myalgias. See PT post Hip surgery doing ok    Skin: Negative for rash. Neurological: Negative for headaches. Psychiatric/Behavioral: Negative for sleep disturbance and suicidal ideas. The patient is not nervous/anxious. Video Exam    There were no vitals filed for this visit. Physical Exam  Vitals and nursing note reviewed. Pulmonary:      Effort: Pulmonary effort is normal.   Neurological:      Mental Status: He is alert and oriented to person, place, and time. Psychiatric:         Mood and Affect: Mood normal.         Behavior: Behavior normal.        Contains abnormal data Protime-INR  Order: [id number]   Status: Final result      Visible to patient: No (inaccessible in [address] [place] [address])      Next appt: 08/09/2023 at 10:15 AM in Physical Therapy ([facility])      Dx: Gastroesophageal reflux disease witho. ..      2 Result Notes          Component Ref Range & Units 8/7/23 12:31 PM 9/18/22  4:55 AM 9/17/22  6:42 PM 9/16/22 11:16 AM   Protime 11.6 - 14.5 seconds 28.3 High   29.3 High   29.9 High   30.4 High     INR 0.84 - 1.19 2.73 High   2.85 High   2.92          Contains abnormal data CBC and differential  Order: [id number]   Status: Final result      Visible to patient: No (inaccessible in [address] [place] [address])      Next appt: 08/09/2023 at 10:15 AM [facility]      Dx: Gastroesophageal reflux disease witho. ..      2 Result Notes           Component Ref Range & Units 8/7/23 12:31 PM 9/17/22  6:30 AM 9/16/22 11:16 AM 1/16/18 11:14 AM 12/12/17 11:58 AM   WBC 4.31 - 10.16 Thousand/uL 5.36  5.59  5.27  4.51  2.66 Low     RBC 3.88 - 5.62 Million/uL 3.96  3.43 Low   3.83 Low   3.98  3.72 Low     Hemoglobin 12.0 - 17.0 g/dL 12.1  10.9 Low   12.2  12.5  11.3 Low     Hematocrit 36.5 - 49.3 % 34.4 Low   31.5 Low   35.2 Low   35.8 Low   33.6 Low     MCV 82 - 98 fL 87  92 92  90  90    MCH 26.8 - 34.3 pg 30.6  31.8  31.9  31.4  30.4    MCHC 31.4 - 37.4 g/dL 35.2  34.6  34.7  34.9  33.6    RDW 11.6 - 15.1 % 14.6  14.6  14.6  17.0 High   19.0 High     MPV 8.9 - 12.7 fL 9.5  9.4  9.2  10.3  11.3    Platelets 486 - 654 Thousands/uL 282  221  236  226  155    nRBC /100 WBCs 0   0  0  0    Neutrophils Relative 43 - 75 % 57   65  44  44    Immat GRANS % 0 - 2 % 0   1      Lymphocytes Relative 14 - 44 % 33   27  22  23    Monocytes Relative 4 - 12 % 7   7  5  8    Eosinophils Relative 0 - 6 % 2   0  29 High   24 High     Basophils Relative 0 - 1 % 1   0  0  1    Neutrophils Absolute 1.85 - 7.62 Thousands/µL 3.03   3.42  1.95  1.16 Low     Immature Grans Absolute 0.00 - 0.20 Thousand/uL 0.01   0.03      Lymphocytes Absolute 0.60 - 4.47 Thousands/µL 1.79   1.40  1.00  0.62    Monocytes Absolute 0.17 - 1.22 Thousand/µL 0.36   0.38  0.21  0.21    Eosinophils Absolute 0.00 - 0.61 Thousand/µL 0.12   0.02  1.32 High   0.65 High     Basophils Absolute 0.00 - 0.10 Thousands/µL 0.05   0.02  0.02  0.02               Specimen Collected: 08/07/23 12:31 PM Last Resulted: 08/07/23  1:13 PM           Gamma GT  Order: [id number]   Status: Final result      Visible to patient: No (inaccessible in [facility])      Next appt: 08/09/2023 at 10:15 AM in Physical Therapy ([facility])      Dx: Gastroesophageal reflux disease witho. ..      0 Result Notes       Component Ref Range & Units 8/7/23 12:31 PM   GGT 5 - 85 U/L 33               Specimen Collected: 08/07/23 12:31 PM Last Resulted: 08/07/23  7:23 PM              Contains abnormal data Comprehensive metabolic panel  Order: [id number]   Status: Final result      Visible to patient: No (inaccessible in [address] [place], [place] [address]d)      Next appt: 08/09/2023 at 10:15 AM in Physical Therapy ([facility])      Dx: Gastroesophageal reflux disease witho. ..      2 Result Notes            Component Ref Range & Units 8/7/23 12:31 PM 9/18/22  4:55 AM 9/17/22  6:30 AM 9/16/22 11:16 AM 1/16/18 11:14 AM 12/12/17 11:58 AM   Sodium 135 - 147 mmol/L 140  139  142  140  139 R  142 R    Potassium 3.5 - 5.3 mmol/L 3.0 Low   3.8  3.4 Low   2.9 Low   4.0  3.9    Chloride 96 - 108 mmol/L 104  104  107  106  105 R  107 R    CO2 21 - 32 mmol/L 29  27  27  28  29  31    ANION GAP mmol/L 7  8 R  8 R  6 R  5 R  4 R    BUN 5 - 25 mg/dL 7  12  9  8  13  10    Creatinine 0.60 - 1.30 mg/dL 0.73  0.70 CM  0.70 CM  0.75 CM  1.07 CM  0.77 CM    Comment: Standardized to IDMS reference method   Glucose, Fasting 65 - 99 mg/dL 85   99 CM   81 CM  72 CM    Calcium 8.4 - 10.2 mg/dL 9.7  9.1 R  9.1 R  9.2 R  9.8 R  9.2 R    AST 13 - 39 U/L 18    17 R, CM  20 R, CM  25 R, CM    ALT 7 - 52 U/L 11    26 R, CM  22 R, CM  30 R, CM    Comment: Specimen collection should occur prior to Sulfasalazine administration due to the potential for falsely depressed results. Alkaline Phosphatase 34 - 104 U/L 129 High     104 R  80 R  84 R    Total Protein 6.4 - 8.4 g/dL 8.2    7.8  9.2 High  R  8.9 High  R    Albumin 3.5 - 5.0 g/dL 4.6    3.8  4.4  3.8    Total Bilirubin 0.20 - 1.00 mg/dL 0.71    0.51 CM  0.52  0.42    Comment: Use of this assay is not recommended for patients undergoing treatment with eltrombopag due to the potential for falsely elevated results. N-acetyl-p-benzoquinone imine (metabolite of Acetaminophen) will generate erroneously low results in samples for patients that have taken an overdose of Acetaminophen. eGFR ml/min/1.73sq m 102  105             UA w Reflex to Microscopic w Reflex to Culture -Lab Collect  Order: [id number]   Status: Final result      Visible to patient: No (inaccessible in [address] [place] [address])      Next appt: 08/09/2023 at 10:15 AM in Physical Therapy ([facility])      Dx: Gastroesophageal reflux disease witho. ..     Specimen Information: Urine    1 Result Note        Component Ref Range & Units 8/7/23 12:31 PM 12/12/17 11:58 AM   Color, UA  Light Yellow  Yellow    Clarity, UA  Clear  Clear    Specific Gravity, UA 1.003 - 1.030 1.010  1.013    pH, UA 4.5, 5.0, 5.5, 6.0, 6.5, 7.0, 7.5, 8.0 6.5  6.5 R    Leukocytes, UA Negative Negative  Negative    Nitrite, UA Negative Negative  Negative    Protein, UA Negative mg/dl Negative  Negative    Glucose, UA Negative mg/dl Negative  Negative    Ketones, UA Negative mg/dl Negative  Negative    Urobilinogen, UA <2.0 mg/dl mg/dl <2.0     Bilirubin, UA Negative Negative  Negative    Occult Blood, UA Negative Negative  Negative               Specimen Collected: 08/07/23 12:31 PM Last Resulted: 08/07/23  1:23 PM             Visit Time  Total Visit Duration: 20

## 2023-08-09 ENCOUNTER — OFFICE VISIT (OUTPATIENT)
Dept: PHYSICAL THERAPY | Facility: CLINIC | Age: 58
End: 2023-08-09
Payer: COMMERCIAL

## 2023-08-09 DIAGNOSIS — Z96.641 STATUS POST RIGHT HIP REPLACEMENT: Primary | ICD-10-CM

## 2023-08-09 DIAGNOSIS — M16.11 PRIMARY OSTEOARTHRITIS OF RIGHT HIP: ICD-10-CM

## 2023-08-09 PROCEDURE — 97110 THERAPEUTIC EXERCISES: CPT

## 2023-08-09 PROCEDURE — 97112 NEUROMUSCULAR REEDUCATION: CPT

## 2023-08-09 PROCEDURE — 88112 CYTOPATH CELL ENHANCE TECH: CPT | Performed by: PATHOLOGY

## 2023-08-09 NOTE — PROGRESS NOTES
Daily Note     Today's date: 2023  Patient name: Wily Coleman  : 1965  MRN: [id number]  Referring provider: AGATHA Bustamante*  Dx:   Encounter Diagnosis     ICD-10-CM    1. Status post right hip replacement  Z96.641       2. Primary osteoarthritis of right hip  M16.11           Start Time: 0900  Stop Time: 0945  Total time in clinic (min): 45 minutes    Subjective: Patient reports to physical therapy today stating that his R hip is feeling good. Objective: See treatment diary below      Assessment: Tolerated treatment well. Patient noted increased fatigue following SL leg press exercise today. Patient demonstrated fatigue post treatment, exhibited good technique with therapeutic exercises and would benefit from continued PT. Plan: Continue per plan of care. Precautions: R hip anterior ORTIZ , prior hx L CVA    Insurance:    CMS/ A POC expires PT/OT + Visit Limit?  Co-Insurance   [employer] Medicare   CMS 23 BOMN No   [facility] CMS   BOMN No            Visit/Unit [address] [place] [address] 4th visit  Date 5/31 6/1 6/8 6/12 6/15 6/21 6/26  8/2 8/3  8/9          FOTO done :            Manuals 8/3 8/9    6/12 6/15 6/21 6/26 8/2   PROM R hip                                                    Neuro Re-Ed             Pt education              Bridges c TB ER 2x10 blue 2x10 blue    2x10 GTB GTB  2x10 2x10 blue 2x10 blue 2x10 grn   Standing march to       High knee   2x15 3" on foam High knee on foam 3" 2x15 ea NP     SLRs 2x10 L, 3x10 R  2# b/l  2x10 L, 3x10 R  2# b/l     3x10 2# on R 2# AW on R 3x10 3x10 2# on R 3x10 2# on R 2x10 2# b/l    SLS      5x20" bilat 20' 5x B/L 20' 5x B/L 20' 5x B/L    Y-balance      1x10 by bar UE support and 3 cones By bar UE support 3 cones 10x 1x10 ea. by bar 1x10 ea. by bar    Side steps on foam  3 laps 3 laps    x3 laps X 3 laps X 3 laps X 3 laps    Heel toe walk on foam  3 laps 3 laps    x3 laps X 3 laps X 3 laps X 3 laps    EC stance with perturbations        3x30" 3x30"    Rocker board A/P             Ther Ex             Reverse clamshell             S/L hip abd 2x10 2# 2x10 2#    3x10 2#  3x10 2# 3x10 2# 2x10 2#   Nustep L3 9' L 3 9'    7' L4 L4 7' L4 9' L4 9' L4 9'                             Ther Activity             Sit to stands c airex 2x10 0# 2x10 0#    3x10 15#  3x10 15# 2x10 20# 2x10 0# difficult 2x10 0#   Stair ascent/descent R hand Rail 10x R hand Rail 10x    10x R handrail  R hand Rail 10x R hand Rail 10x R hand Rail 10x R hand Rail 10x   SL leg press 125# 3x10 125# 3x10    3x10 125# 125# 3x10 125# 3x10 125# 3x10 125# 3x10   Lateral step up             RDL weight  20# DB on 8" box x15 20# DB on 8" box x15    2x10 15# DB on 8" box 15# DB on 8" box 2x10 20# DB on 8" box x15 20# DB on 8" box x15 NV   SL RDL weight  8# KB on 14" box  x7 R 8# KB on 14" box  x7 R      8# KB on 14" box  x7 R 8# KB on 14" box  x7 R 8# KB on 14" box  x7 R   Gait Training                                       Modalities

## 2023-08-11 ENCOUNTER — OFFICE VISIT (OUTPATIENT)
Dept: PHYSICAL THERAPY | Facility: CLINIC | Age: 58
End: 2023-08-11
Payer: COMMERCIAL

## 2023-08-11 DIAGNOSIS — Z96.641 STATUS POST RIGHT HIP REPLACEMENT: Primary | ICD-10-CM

## 2023-08-11 DIAGNOSIS — M16.11 PRIMARY OSTEOARTHRITIS OF RIGHT HIP: ICD-10-CM

## 2023-08-11 PROCEDURE — 97112 NEUROMUSCULAR REEDUCATION: CPT

## 2023-08-11 PROCEDURE — 97110 THERAPEUTIC EXERCISES: CPT

## 2023-08-11 NOTE — PROGRESS NOTES
Daily Note     Today's date: 2023  Patient name: Pineda Cardoza  : 1965  MRN: [id number]  Referring provider: AGATHA Maciel*  Dx:   Encounter Diagnosis     ICD-10-CM    1. Status post right hip replacement  Z96.641       2. Primary osteoarthritis of right hip  M16.11                      Subjective: Pt reports only his knees bother him today. Objective: See treatment diary below      Assessment: Tolerated treatment well. Added lateral step up with alt hip flexion today. No complaints of increased pain verbalized during session. He continues to tolerate 125# on leg press well. Patient demonstrated fatigue post treatment, exhibited good technique with therapeutic exercises and would benefit from continued PT      Plan: Continue per plan of care. Precautions: R hip anterior ORTIZ , prior hx L CVA    Insurance:    CMS/ A POC expires PT/OT + Visit Limit?  Co-Insurance   [employer] Medicare   CMS 23 BOMN No   [facility] CMS   BOMN No            Visit/Unit [facility] [place] 4th visit  Date 5/31 6/1 6/8 6/12 6/15 6/21 6/26  8/2 8/3  8/9  8/11        FOTO done :            Manuals 8/3 8/9 8/11   6/12 6/15 6/21 6/26 8/2   PROM R hip                                                    Neuro Re-Ed             Pt education              Bridges c TB ER 2x10 blue 2x10 blue 2x10 blue   2x10 GTB GTB  2x10 2x10 blue 2x10 blue 2x10 grn   Standing march to       High knee   2x15 3" on foam High knee on foam 3" 2x15 ea NP     SLRs 2x10 L, 3x10 R  2# b/l  2x10 L, 3x10 R  2# b/l  2x10 L, 3x10 R  2# b/l    3x10 2# on R 2# AW on R 3x10 3x10 2# on R 3x10 2# on R 2x10 2# b/l    SLS      5x20" bilat 20' 5x B/L 20' 5x B/L 20' 5x B/L    Y-balance      1x10 by bar UE support and 3 cones By bar UE support 3 cones 10x 1x10 ea. by bar 1x10 ea. by bar    Side steps on foam  3 laps 3 laps 3 laps   x3 laps X 3 laps X 3 laps X 3 laps    Heel toe walk on foam  3 laps 3 laps 3 laps   x3 laps X 3 laps X 3 laps X 3 laps    EC stance with perturbations        3x30" 3x30"    Rocker board A/P             Ther Ex             Reverse clamshell             S/L hip abd 2x10 2# 2x10 2# 2x10 2#   3x10 2#  3x10 2# 3x10 2# 2x10 2#   Nustep L3 9' L 3 9' L3 9'   7' L4 L4 7' L4 9' L4 9' L4 9'                             Ther Activity             Sit to stands c airex 2x10 0# 2x10 0# 2x10 0#   3x10 15#  3x10 15# 2x10 20# 2x10 0# difficult 2x10 0#   Stair ascent/descent R hand Rail 10x R hand Rail 10x R hand Rail 10x   10x R handrail  R hand Rail 10x R hand Rail 10x R hand Rail 10x R hand Rail 10x   SL leg press 125# 3x10 125# 3x10 125# 3x10   3x10 125# 125# 3x10 125# 3x10 125# 3x10 125# 3x10   Lateral step up with alt hip flexion   1x10 8"          RDL weight  20# DB on 8" box x15 20# DB on 8" box x15 20# DB on 8" box x15   2x10 15# DB on 8" box 15# DB on 8" box 2x10 20# DB on 8" box x15 20# DB on 8" box x15 NV   SL RDL weight  8# KB on 14" box  x7 R 8# KB on 14" box  x7 R      8# KB on 14" box  x7 R 8# KB on 14" box  x7 R 8# KB on 14" box  x7 R   Gait Training                                       Modalities

## 2023-08-13 NOTE — RESULT ENCOUNTER NOTE
will discuss at follow up     IMPRESSION:     Small amount of layering debris/sludge within the gallbladder.  No sonographic evidence for cholelithiasis, acute cholecystitis, or significant biliary ductal dilatation.

## 2023-08-16 ENCOUNTER — APPOINTMENT (OUTPATIENT)
Dept: PHYSICAL THERAPY | Facility: CLINIC | Age: 58
End: 2023-08-16
Payer: COMMERCIAL

## 2023-08-16 NOTE — PROGRESS NOTES
Daily Note     Today's date: 2023  Patient name: Mray Anne Mercado  : 1965  MRN: [id number]  Referring provider: AGATHA Conteh*  Dx: No diagnosis found. Subjective: Patient reports to physical therapy today stating         Objective: See treatment diary below      Assessment: Tolerated treatment well. Patient demonstrated fatigue post treatment, exhibited good technique with therapeutic exercises and would benefit from continued PT. Plan: Continue per plan of care. Precautions: R hip anterior ORTIZ , prior hx L CVA    Insurance:    CMS/ AMA POC expires PT/OT + Visit Limit?  Co-Insurance   [employer] Medicare   CMS 23 BOMN No   [facility] CMS   BOMN No            Visit/Unit [facility] [place] 4th visit  Date 5/31 6/1 6/8 6/12 6/15 6/21 6/26  8/2 8/3  8/9  8/11  8/16      FOTO done :            Manuals 8/3 8/9 8/11 8/16  6/12 6/15 6/21 6/26 8/2   PROM R hip                                                    Neuro Re-Ed             Pt education              Bridges c TB ER 2x10 blue 2x10 blue 2x10 blue   2x10 GTB GTB  2x10 2x10 blue 2x10 blue 2x10 grn   Standing march to       High knee   2x15 3" on foam High knee on foam 3" 2x15 ea NP     SLRs 2x10 L, 3x10 R  2# b/l  2x10 L, 3x10 R  2# b/l  2x10 L, 3x10 R  2# b/l    3x10 2# on R 2# AW on R 3x10 3x10 2# on R 3x10 2# on R 2x10 2# b/l    SLS      5x20" bilat 20' 5x B/L 20' 5x B/L 20' 5x B/L    Y-balance      1x10 by bar UE support and 3 cones By bar UE support 3 cones 10x 1x10 ea. by bar 1x10 ea. by bar    Side steps on foam  3 laps 3 laps 3 laps   x3 laps X 3 laps X 3 laps X 3 laps    Heel toe walk on foam  3 laps 3 laps 3 laps   x3 laps X 3 laps X 3 laps X 3 laps    EC stance with perturbations        3x30" 3x30"    Rocker board A/P             Ther Ex             Reverse clamshell             S/L hip abd 2x10 2# 2x10 2# 2x10 2#   3x10 2#  3x10 2# 3x10 2# 2x10 2#   Nustep L3 9' L 3 9' L3 9'   7' L4 L4 7' L4 9' L4 9' L4 9'                             Ther Activity             Sit to stands c airex 2x10 0# 2x10 0# 2x10 0#   3x10 15#  3x10 15# 2x10 20# 2x10 0# difficult 2x10 0#   Stair ascent/descent R hand Rail 10x R hand Rail 10x R hand Rail 10x   10x R handrail  R hand Rail 10x R hand Rail 10x R hand Rail 10x R hand Rail 10x   SL leg press 125# 3x10 125# 3x10 125# 3x10   3x10 125# 125# 3x10 125# 3x10 125# 3x10 125# 3x10   Lateral step up with alt hip flexion   1x10 8"          RDL weight  20# DB on 8" box x15 20# DB on 8" box x15 20# DB on 8" box x15   2x10 15# DB on 8" box 15# DB on 8" box 2x10 20# DB on 8" box x15 20# DB on 8" box x15 NV   SL RDL weight  8# KB on 14" box  x7 R 8# KB on 14" box  x7 R      8# KB on 14" box  x7 R 8# KB on 14" box  x7 R 8# KB on 14" box  x7 R   Gait Training                                       Modalities

## 2023-08-18 ENCOUNTER — APPOINTMENT (OUTPATIENT)
Dept: PHYSICAL THERAPY | Facility: CLINIC | Age: 58
End: 2023-08-18
Payer: COMMERCIAL

## 2023-08-23 ENCOUNTER — OFFICE VISIT (OUTPATIENT)
Dept: PHYSICAL THERAPY | Facility: CLINIC | Age: 58
End: 2023-08-23
Payer: COMMERCIAL

## 2023-08-23 DIAGNOSIS — Z96.641 STATUS POST RIGHT HIP REPLACEMENT: Primary | ICD-10-CM

## 2023-08-23 DIAGNOSIS — M16.11 PRIMARY OSTEOARTHRITIS OF RIGHT HIP: ICD-10-CM

## 2023-08-23 PROCEDURE — 97530 THERAPEUTIC ACTIVITIES: CPT

## 2023-08-23 PROCEDURE — 97110 THERAPEUTIC EXERCISES: CPT

## 2023-08-23 PROCEDURE — 97112 NEUROMUSCULAR REEDUCATION: CPT

## 2023-08-23 NOTE — PROGRESS NOTES
Daily Note     Today's date: 2023  Patient name: Lawyer Cameron  : 1965  MRN: [id number]  Referring provider: AGATHA Kolb*  Dx:   Encounter Diagnosis     ICD-10-CM    1. Status post right hip replacement  Z96.641       2. Primary osteoarthritis of right hip  M16.11           Start Time: 0845  Stop Time: 930  Total time in clinic (min): 45 minutes    Subjective: Pt reports his legs have been feeling better. Objective: See treatment diary below      Assessment: Tolerated treatment well. Patient demonstrated fatigue post treatment, exhibited good technique with therapeutic exercises and would benefit from continued PT. Pt demonstrates more difficulty now with sit to stands compared to last time I treated him. Unable to perform with added resistance and pt demonstrates some compensation with hands on knees. Unsure of cause of increased weakness, discussed with pt and advised mentioning any new weakness feeling to his doctor. Plan: Continue per plan of care. Progress treatment as tolerated. Precautions: R hip anterior ORTIZ , prior hx L CVA    Insurance:    CMS/ AMA POC expires PT/OT + Visit Limit?  Co-Insurance   [employer] Medicare CMS 23 BOMN No   [facility] CMS   BOMN No            Visit/Unit Tracking: FOTO 4th visit  Date 5/31 6/1 6/8 6/12 6/15 6/21 6/26  8/2 8/3  8/9  8/11        FOTO done :            Manuals 8/3 8/9 8/11 8/23     6/26 8/2   PROM R hip                                                    Neuro Re-Ed             Pt education     3'         Bridges c TB ER 2x10 blue 2x10 blue 2x10 blue      2x10 blue 2x10 grn   Standing              SLRs 2x10 L, 3x10 R  2# b/l  2x10 L, 3x10 R  2# b/l  2x10 L, 3x10 R  2# b/l  3x10 B 2#      3x10 2# on R 2x10 2# b/l    SLS         20' 5x B/L    Y-balance         1x10 ea. by bar    Side steps on foam  3 laps 3 laps 3 laps 3 laps     X 3 laps    Heel toe walk on foam  3 laps 3 laps 3 laps 4 laps     X 3 laps    High stepping at bar    2 laps         Rocker board A/P             Ther Ex             Reverse clamshell             S/L hip abd 2x10 2# 2x10 2# 2x10 2# 2x10 2#     3x10 2# 2x10 2#   Nustep L3 9' L 3 9' L3 9' L4 10'     L4 9' L4 9'                             Ther Activity             Sit to stands c airex 2x10 0# 2x10 0# 2x10 0# 3x10     2x10 0# difficult 2x10 0#   Stair ascent/descent R hand Rail 10x R hand Rail 10x R hand Rail 10x R hand Rail 10x     R hand Rail 10x R hand Rail 10x   SL leg press 125# 3x10 125# 3x10 125# 3x10 3x10 95# seat 9     125# 3x10 125# 3x10   Lateral step up with alt hip flexion   1x10 8"          RDL weight  20# DB on 8" box x15 20# DB on 8" box x15 20# DB on 8" box x15 20# DB on 8" box x15     20# DB on 8" box x15 NV   SL RDL weight  8# KB on 14" box  x7 R 8# KB on 14" box  x7 R  8# KB on 14" box  x7 B     8# KB on 14" box  x7 R 8# KB on 14" box  x7 R   Gait Training                                       Modalities

## 2023-08-25 ENCOUNTER — APPOINTMENT (OUTPATIENT)
Dept: PHYSICAL THERAPY | Facility: CLINIC | Age: 58
End: 2023-08-25
Payer: COMMERCIAL

## 2023-08-25 DIAGNOSIS — Z96.641 STATUS POST RIGHT HIP REPLACEMENT: Primary | ICD-10-CM

## 2023-08-25 DIAGNOSIS — M16.11 PRIMARY OSTEOARTHRITIS OF RIGHT HIP: ICD-10-CM

## 2023-08-25 NOTE — PROGRESS NOTES
Daily Note     Today's date: 2023  Patient name: Tati Rene  : 1965  MRN: [id number]  Referring provider: AGATHA Velasquez*  Dx:   Encounter Diagnosis     ICD-10-CM    1. Status post right hip replacement  Z96.641       2. Primary osteoarthritis of right hip  M16.11                      Subjective: ***      Objective: See treatment diary below      Assessment: Tolerated treatment {Tolerated treatment :9897774509}. Patient {assessment:0957674543}      Plan: {PLAN:5113319073}     Precautions: R hip anterior ORTIZ , prior hx L CVA    Insurance:    CMS/ A POC expires PT/OT + Visit Limit?  Co-Insurance   [employer] Medicare   CMS 23 BOMN No   [facility] CMS   BOMN No            Visit/Unit Trackin[address] [place] [address] 4th visit  Date 5/31 6/1 6/8 6/12 6/15 6/21 6/26  8/2 8/3  8/9  8/11        FOTO done :            Manuals 8/3 8/9 8/11 8/23 8/25    6/26 8/2   PROM R hip                                                    Neuro Re-Ed             Pt education     3'         Bridges c TB ER 2x10 blue 2x10 blue 2x10 blue      2x10 blue 2x10 grn   Standing              SLRs 2x10 L, 3x10 R  2# b/l  2x10 L, 3x10 R  2# b/l  2x10 L, 3x10 R  2# b/l  3x10 B 2#      3x10 2# on R 2x10 2# b/l    SLS         20' 5x B/L    Y-balance         1x10 ea. by bar    Side steps on foam  3 laps 3 laps 3 laps 3 laps     X 3 laps    Heel toe walk on foam  3 laps 3 laps 3 laps 4 laps     X 3 laps    High stepping at bar    2 laps         Rocker board A/P             Ther Ex             Reverse clamshell             S/L hip abd 2x10 2# 2x10 2# 2x10 2# 2x10 2#     3x10 2# 2x10 2#   Nustep L3 9' L 3 9' L3 9' L4 10'     L4 9' L4 9'                             Ther Activity             Sit to stands c airex 2x10 0# 2x10 0# 2x10 0# 3x10     2x10 0# difficult 2x10 0#   Stair ascent/descent R hand Rail 10x R hand Rail 10x R hand Rail 10x R hand Rail 10x     R hand Rail 10x R hand Rail 10x   SL leg press 125# 3x10 125# 3x10 125# 3x10 3x10 95# seat 9     125# 3x10 125# 3x10   Lateral step up with alt hip flexion   1x10 8"          RDL weight  20# DB on 8" box x15 20# DB on 8" box x15 20# DB on 8" box x15 20# DB on 8" box x15     20# DB on 8" box x15 NV   SL RDL weight  8# KB on 14" box  x7 R 8# KB on 14" box  x7 R  8# KB on 14" box  x7 B     8# KB on 14" box  x7 R 8# KB on 14" box  x7 R   Gait Training                                       Modalities

## 2023-08-29 ENCOUNTER — TELEPHONE (OUTPATIENT)
Age: 58
End: 2023-08-29

## 2023-08-29 NOTE — TELEPHONE ENCOUNTER
I received a Care Request from patient to schedule for:    Where Does it Hurt? Arthritis   Are you considering joint replacement? No   Are you seeking a second opinion? Yes  If yes, who is your doctor? I lvm to [facility] at [phone].

## 2023-08-30 ENCOUNTER — OFFICE VISIT (OUTPATIENT)
Dept: PHYSICAL THERAPY | Facility: CLINIC | Age: 58
End: 2023-08-30
Payer: COMMERCIAL

## 2023-08-30 DIAGNOSIS — Z96.641 STATUS POST RIGHT HIP REPLACEMENT: Primary | ICD-10-CM

## 2023-08-30 DIAGNOSIS — M16.11 PRIMARY OSTEOARTHRITIS OF RIGHT HIP: ICD-10-CM

## 2023-08-30 PROCEDURE — 97112 NEUROMUSCULAR REEDUCATION: CPT

## 2023-08-30 PROCEDURE — 97530 THERAPEUTIC ACTIVITIES: CPT

## 2023-08-30 PROCEDURE — 97110 THERAPEUTIC EXERCISES: CPT

## 2023-08-30 NOTE — PROGRESS NOTES
PT Discharge    Today's date: 2023  Patient name: Nicole Deng  : 1965  MRN: [id number]  Referring provider: AGATHA Daniels*  Dx:   Encounter Diagnosis     ICD-10-CM    1. Status post right hip replacement  Z96.641       2. Primary osteoarthritis of right hip  M16.11           Start Time: 0930  Stop Time: 1015  Total time in clinic (min): 45 minutes    Subjective: Patient reports to physical therapy today stating his R hip has been feeling good and notes improvements with performance of functional activities. Patient notes experiencing no pain in his R hip today. Objective: See treatment diary below      Assessment: Tolerated treatment well. Patient demonstrated fatigue post treatment, exhibited good technique with therapeutic exercises and would benefit from continued PT. Patient with continued good effort with performance of therapeutic exercises. Patient to be discharged at this time secondary to improvements in R hip strength and performance of ADL's/self-care activities. Patient also received improved FOTO score, further indicating improved function with daily activities. Plan: Patient to be discharged this visit. Precautions: R hip anterior ORTIZ , prior hx L CVA    Insurance:    CMS/ AMA POC expires PT/OT + Visit Limit?  Co-Insurance   [employer] Medicare   CMS 23 BOMN No   [facility] CMS   BOMN No            Visit/Unit Tracking: FOTO 4th visit  Date 5/31 6/1 6/8 6/12 6/15 6/21 6/26  8/2 8/3  8/9  8/11        FOTO done :            Manuals 8/3 8/9 8/11 8/23 8/30    6/26 8/2   PROM R hip                                                    Neuro Re-Ed             Pt education     3'         Bridges c TB ER 2x10 blue 2x10 blue 2x10 blue  2x10 blue     2x10 blue 2x10 grn   Standing march to              SLRs 2x10 L, 3x10 R  2# b/l  2x10 L, 3x10 R  2# b/l  2x10 L, 3x10 R  2# b/l  3x10 B 2#  3x10 2#    3x10 2# on R 2x10 2# b/l    SLS         20' 5x B/L Y-balance         1x10 ea. by bar    Side steps on foam  3 laps 3 laps 3 laps 3 laps 3 laps     X 3 laps    Heel toe walk on foam  3 laps 3 laps 3 laps 4 laps 4 laps    X 3 laps    High stepping at bar    2 laps 2 laps         Rocker board A/P             Ther Ex             Reverse clamshell             S/L hip abd 2x10 2# 2x10 2# 2x10 2# 2x10 2# 2x10 2#    3x10 2# 2x10 2#   Nustep L3 9' L 3 9' L3 9' L4 10' L4     L4 9' L4 9'                             Ther Activity             Sit to stands c airex 2x10 0# 2x10 0# 2x10 0# 3x10 3x10     2x10 0# difficult 2x10 0#   Stair ascent/descent R hand Rail 10x R hand Rail 10x R hand Rail 10x R hand Rail 10x R hand Rail 10x    R hand Rail 10x R hand Rail 10x   SL leg press 125# 3x10 125# 3x10 125# 3x10 3x10 95# seat 9 3x10 95# seat 9    125# 3x10 125# 3x10   Lateral step up with alt hip flexion   1x10 8"          RDL weight  20# DB on 8" box x15 20# DB on 8" box x15 20# DB on 8" box x15 20# DB on 8" box x15 20# DB on 8" box x15    20# DB on 8" box x15 NV   SL RDL weight  8# KB on 14" box  x7 R 8# KB on 14" box  x7 R  8# KB on 14" box  x7 B 8# KB on 14" box  x7 B    8# KB on 14" box  x7 R 8# KB on 14" box  x7 R   Gait Training                                       Modalities

## 2023-08-31 NOTE — PROGRESS NOTES
PT Evaluation     Today's date: 2023  Patient name: Sushant Lopez  : 1965  MRN: [id number]  Referring provider: Be Doss DO  Dx:   Encounter Diagnosis     ICD-10-CM    1. Primary osteoarthritis of left knee  M17.12 Ambulatory Referral to Physical Therapy      2. Avascular necrosis of left femur ([address])  M87.052 Ambulatory Referral to Physical Therapy          Start Time: 1015  Stop Time: 1100  Total time in clinic (min): 45 minutes    Assessment  Assessment details: Pt is a 63 y.o. male presenting to physical therapy with chief complaint of L knee pain. Pt presents with global weakness of LLE and limited functional mobility, especially with closed chain knee extension with stairs and transfers. Pt's impairments are resulting in limitations with transfers, standing, ambulation, stairs, and household activities. PT POC will focus on strengthening L quadriceps and LLE in order to decrease pain and improve function. Pt is a good candidate for skilled PT to address impairments and facilitate return to prior level of function. Impairments: abnormal gait, abnormal or restricted ROM, activity intolerance, impaired balance, impaired physical strength, lacks appropriate home exercise program and pain with function  Functional limitations: transfers, standing, ambulation, stairs, and household activities  Symptom irritability: moderate  Goals  STG 4 - weeks  1. Patient will demonstrate ability to perform sit to stand without UE assist or compensation to facilitate functional ability. 2. Patient will demonstrate improved L knee extension strength to 4/5 or better to facilitate functional ability. LTG 8 - weeks  1. Patient will demonstrate decreased pain at worst with ascending stairs to 2/10 or better to facilitate functional ability. 2. Patient will demonstrate increased FOTO score by 10 points or more from baseline.     Plan  Patient would benefit from: PT eval and skilled physical therapy  Planned modality interventions: cryotherapy, thermotherapy: hydrocollator packs and unattended electrical stimulation  Planned therapy interventions: activity modification, balance/weight bearing training, gait training, functional ROM exercises, home exercise program, joint mobilization, manual therapy, neuromuscular re-education, patient education, strengthening, stretching, therapeutic activities and therapeutic exercise  Frequency: 2x week  Duration in weeks: 8  Plan of Care beginning date: 2023  Plan of Care expiration date: 10/27/2023  Treatment plan discussed with: patient        Subjective Evaluation    History of Present Illness  Mechanism of injury: Pt reports prior hx of CVAs that affected the L side of his body. Pt has had L hip replaced in the past, and had R hip replaced this year and attended physical therapy for this. Pt has been noting worsening L knee pain for over a year, obtained script from Dr. Omer Armijo. Pt received MRI earlier this year which showed multifocal AVN tricompartmental osteoarthritis and low-grade partial-thickness tear of the PCL and popliteus muscle. Pt reports his L knee has been getting worse recently, pt has clicking and it also gives out on him. Pt reports a constant aching pain in his L knee. Pt reports his knee swells up on him occasionally, has been using a compression sleeve which helps somewhat. Pt reports increased discomfort when ambulating and standing, tries to rest when he's at home. Pt has full flight of stairs inside the house with L handrail which sometimes gives him trouble and he has to pull himself up, more difficulty going up than down. Pt also reports difficulty with getting up from chair. Pt has injection scheduled for  with Dr. Omer Armijo.    Patient Goals  Patient goals for therapy: decreased pain  Patient goal: improve walking and stairs  Pain  Current pain ratin  At best pain ratin  At worst pain ratin  Location: L knee  Quality: dull ache  Relieving factors: rest and relaxation  Aggravating factors: standing, walking and stair climbing  Progression: worsening          Objective     Palpation     Additional Palpation Details  Min restriction B hamstrings    Tenderness     Additional Tenderness Details  No TTP    Active Range of Motion   Left Knee   Flexion: 120 degrees with pain  Extension: 0 degrees   Extensor la degrees     Right Knee   Flexion: 123 degrees     Additional Active Range of Motion Details  Extensor lag measured with SLR    Strength/Myotome Testing     Left Hip   Planes of Motion   Flexion: 4  Extension: 4  Abduction: 4    Right Hip   Planes of Motion   Flexion: 4+  Extension: 4+  Abduction: 4+    Left Knee   Flexion: 4-  Extension: 4-    Right Knee   Flexion: 4  Extension: 4    Additional Strength Details  Pain in L knee with resisted testing    Ambulation     Observational Gait   Gait: antalgic     Additional Observational Gait Details  Ambulates with SBQC in R hand             Precautions: B ORTIZ, CVA affecting L side    POC expires Auth Status Unit limit Start date  Expiration date PT/OT + Visit Limit?   10/27 no no   BOMN                   Manuals                                                                 Neuro Re-Ed             Pt education about pathology, POC, HEP 8'            Quad sets 1x10 5"            SLR 1x10            [place] TKE 1x15 5" 8#                                      Ther Ex             LAQs 2x10            S/L hip abd 1x10            Prone hip ext 1x10            SL leg press 2x10 45#                                                                Ther Activity                                       Gait Training                                       Modalities

## 2023-09-01 ENCOUNTER — EVALUATION (OUTPATIENT)
Dept: PHYSICAL THERAPY | Facility: CLINIC | Age: 58
End: 2023-09-01
Payer: COMMERCIAL

## 2023-09-01 DIAGNOSIS — M87.052 AVASCULAR NECROSIS OF LEFT FEMUR (HCC): ICD-10-CM

## 2023-09-01 DIAGNOSIS — M17.12 PRIMARY OSTEOARTHRITIS OF LEFT KNEE: Primary | ICD-10-CM

## 2023-09-01 PROCEDURE — 97161 PT EVAL LOW COMPLEX 20 MIN: CPT

## 2023-09-01 PROCEDURE — 97110 THERAPEUTIC EXERCISES: CPT

## 2023-09-01 PROCEDURE — 97112 NEUROMUSCULAR REEDUCATION: CPT

## 2023-09-06 ENCOUNTER — APPOINTMENT (OUTPATIENT)
Dept: PHYSICAL THERAPY | Facility: CLINIC | Age: 58
End: 2023-09-06
Payer: COMMERCIAL

## 2023-09-06 NOTE — PROGRESS NOTES
Daily Note     Today's date: 2023  Patient name: Jennyfer Romero  : 1965  MRN: [id number]  Referring provider: Jaleel Rendon DO  Dx: No diagnosis found. Subjective: Patient reports to physical therapy today stating         Objective: See treatment diary below      Assessment: Tolerated treatment well. Patient demonstrated fatigue post treatment, exhibited good technique with therapeutic exercises and would benefit from continued PT. Plan: Continue per plan of care.       Precautions: B ORTIZ, CVA affecting L side    POC expires Auth Status Unit limit Start date  Expiration date PT/OT + Visit Limit?   10/27 no no   BOMN                   Manuals                                                                Neuro Re-Ed             Pt education about pathology, POC, HEP 8'            Quad sets 1x10 5"            SLR 1x10            Juan A TKE 1x15 5" 8#                                      Ther Ex             LAQs 2x10            S/L hip abd 1x10            Prone hip ext 1x10            SL leg press 2x10 45#                                                                Ther Activity                                       Gait Training                                       Modalities

## 2023-09-08 ENCOUNTER — APPOINTMENT (OUTPATIENT)
Dept: PHYSICAL THERAPY | Facility: CLINIC | Age: 58
End: 2023-09-08
Payer: COMMERCIAL

## 2023-09-08 ENCOUNTER — HOSPITAL ENCOUNTER (EMERGENCY)
Facility: HOSPITAL | Age: 58
Discharge: HOME | End: 2023-09-08
Attending: EMERGENCY MEDICINE | Admitting: EMERGENCY MEDICINE
Payer: COMMERCIAL

## 2023-09-08 VITALS
BODY MASS INDEX: 25.84 KG/M2 | TEMPERATURE: 97.9 F | HEART RATE: 82 BPM | RESPIRATION RATE: 18 BRPM | WEIGHT: 175 LBS | OXYGEN SATURATION: 100 % | DIASTOLIC BLOOD PRESSURE: 112 MMHG | SYSTOLIC BLOOD PRESSURE: 176 MMHG

## 2023-09-08 DIAGNOSIS — I10 HYPERTENSION: Primary | ICD-10-CM

## 2023-09-08 PROCEDURE — 99284 EMERGENCY DEPT VISIT MOD MDM: CPT | Performed by: PHYSICIAN ASSISTANT

## 2023-09-08 PROCEDURE — 99283 EMERGENCY DEPT VISIT LOW MDM: CPT

## 2023-09-08 RX ORDER — AMLODIPINE BESYLATE 5 MG/1
5 TABLET ORAL ONCE
Status: DISCONTINUED | OUTPATIENT
Start: 2023-09-08 | End: 2023-09-08

## 2023-09-08 RX ORDER — AMLODIPINE BESYLATE 5 MG/1
5 TABLET ORAL DAILY
Qty: 21 TABLET | Refills: 0 | Status: SHIPPED | OUTPATIENT
Start: 2023-09-08

## 2023-09-08 NOTE — ED PROVIDER NOTES
History  Chief Complaint   Patient presents with   • Hypertension     Pt presents ambulatory, stating he magalys got his teeth pulled out and was advised to come to the ER due to his blood pressure being high. Hx HTN and was recently taking off his bp medications. PT denies CP, dizziness, headache. Pt states he received esmolol and labetalol at the dentist PTA. 18-year-old male patient here for evaluation of elevated blood pressure reading. He was at his dentist for a tooth extraction when they noted him to have elevated blood pressure. He was given medications prehospital at the dentist office. He offers no complaints aside from dental pain. He has no chest pain or shortness of breath no dizziness or lightheadedness. No headache no syncope no vision changes no extremity numbness tingling or weakness. He notes he recently changed family physicians and during the change there was a lapse in his blood pressure medicines and has not received a new prescription for his blood pressure medicine from his family doctor. He is therefore been without his usual blood pressure medication for the past 2 months. History provided by:  Patient   used: No    Hypertension  Associated symptoms: no abdominal pain, no chest pain, no ear pain, no fever, no hematuria, no palpitations, no shortness of breath and not vomiting        Prior to Admission Medications   Prescriptions Last Dose Informant Patient Reported? Taking?    Darunavir-Cobicistat 800-150 MG TABS  Self Yes No   Sig: Take 1 tablet by mouth daily   Diclofenac Sodium (VOLTAREN) 1 %   Yes No   albuterol (PROVENTIL HFA,VENTOLIN HFA) 90 mcg/act inhaler  Self Yes No   Sig: Inhale   atorvastatin (LIPITOR) 40 mg tablet   No No   Sig: Take 1 tablet (40 mg total) by mouth daily   bictegravir-emtricitab-tenofovir alafenamide (BIKTARVY) -25 MG tablet   Yes No   Sig: Take 1 tablet by mouth daily with breakfast   diphenhydrAMINE (BENADRYL) 25 mg tablet  Self No No   Sig: Take 2 tablets (50 mg total) by mouth daily at bedtime   fluticasone-salmeterol (Advair HFA) 230-21 MCG/ACT inhaler   No No   Sig: Inhale 2 puffs 2 (two) times a day   gabapentin (NEURONTIN) 600 MG tablet   No No   Sig: Take 1 tablet (600 mg total) by mouth 3 (three) times a day   montelukast (SINGULAIR) 10 mg tablet   No No   Sig: Take 1 tablet (10 mg total) by mouth daily at bedtime   pantoprazole (PROTONIX) 40 mg tablet   No No   Sig: Take 1 tablet (40 mg total) by mouth daily   potassium chloride (K-DUR,KLOR-CON) 10 mEq tablet   No No   Sig: Take 1 tablet (10 mEq total) by mouth daily   tadalafil (CIALIS) 20 MG tablet  Self Yes No   Sig: Take 20 mg by mouth daily as needed for erectile dysfunction   tiZANidine (ZANAFLEX) 4 mg tablet   No No   Sig: Take 1 tablet (4 mg total) by mouth every 8 (eight) hours as needed for muscle spasms for up to 21 days   traZODone (DESYREL) 100 mg tablet   No No   Sig: Take 1 tablet (100 mg total) by mouth daily at bedtime   warfarin (COUMADIN) 7.5 mg tablet   No No   Sig: Take 1 tablet (7.5 mg total) by mouth daily Tues, Thurs Saturday 1/2 pill (3.25mg) on other days      Facility-Administered Medications: None       Past Medical History:   Diagnosis Date   • 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     E-Cigarette/Vaping   • E-Cigarette Use Never User      E-Cigarette/Vaping Substances     Social History     Tobacco Use   • Smoking status: Some Days Types: Cigarettes   • Smokeless tobacco: Never   • Tobacco comments:     CURRENT EVERY DAY SMOKER: 2 CIGARETTES PER DAY (AS PER ALL SCRIPTS)   Vaping Use   • Vaping Use: Never used   Substance Use Topics   • Alcohol use: Not Currently   • Drug use: No     Comment: COCAINE USE (AS PER ALL SCRIPTS)       Review of Systems   Constitutional: Negative for chills and fever. HENT: Negative for ear pain and sore throat. Eyes: Negative for pain and visual disturbance. Respiratory: Negative for cough and shortness of breath. Cardiovascular: Negative for chest pain and palpitations. Gastrointestinal: Negative for abdominal pain and vomiting. Genitourinary: Negative for dysuria and hematuria. Musculoskeletal: Negative for arthralgias and back pain. Skin: Negative for color change and rash. Neurological: Negative for seizures and syncope. All other systems reviewed and are negative. Physical Exam  Physical Exam  Vitals and nursing note reviewed. Constitutional:       General: He is not in acute distress. Appearance: He is well-developed. HENT:      Head: Normocephalic and atraumatic. Eyes:      Conjunctiva/sclera: Conjunctivae normal.   Cardiovascular:      Rate and Rhythm: Normal rate and regular rhythm. Heart sounds: No murmur heard. Pulmonary:      Effort: Pulmonary effort is normal. No respiratory distress. Breath sounds: Normal breath sounds. Abdominal:      Palpations: Abdomen is soft. Tenderness: There is no abdominal tenderness. Musculoskeletal:         General: No swelling. Cervical back: Neck supple. Skin:     General: Skin is warm and dry. Capillary Refill: Capillary refill takes less than 2 seconds. Neurological:      Mental Status: He is alert and oriented to person, place, and time. GCS: GCS eye subscore is 4. GCS verbal subscore is 5. GCS motor subscore is 6. Comments: GCS 15. AAOx3. No focal neuro deficits.  CN II-XII grossly intact. Speech normal, no aphasia or dysarthria. No pronator drift. Cerebellar function normal. Finger to nose normal. PERRL. EOMI. Peripheral vision intact. No nystagmus. Upper and lower extremity strength 5/5 through.  strength 5/5 b/l. Gross sensation intact b/l. Psychiatric:         Mood and Affect: Mood normal.         Vital Signs  ED Triage Vitals [09/08/23 1332]   Temperature Pulse Respirations Blood Pressure SpO2   97.9 °F (36.6 °C) 82 18 (!) 176/112 100 %      Temp Source Heart Rate Source Patient Position - Orthostatic VS BP Location FiO2 (%)   Tympanic Monitor Sitting Left arm --      Pain Score       --           Vitals:    09/08/23 1332   BP: (!) 176/112   Pulse: 82   Patient Position - Orthostatic VS: Sitting         Visual Acuity      ED Medications  Medications - No data to display    Diagnostic Studies  Results Reviewed     None                 No orders to display              Procedures  Procedures         ED Course                                             Medical Decision Making  Differential diagnosis including but not limited to: hypertension, hypertensive urgency, hypertensive crisis, malignant hypertension, end organ damage, renal failure, metabolic abnormality. Plan/MDM: 15-year-old male with elevated blood pressure. He is asymptomatic aside from having pain in his teeth after this dental extraction earlier today. He denies any headache he has no chest pain or shortness of breath. I did offer to check laboratory evaluation however given this seems to be situational elevation of blood pressure as well as patient also has not had his blood pressure medicine in a few months we discussed following up with PCP as is currently does not present like endorgan dysfunction or hypertensive crisis. He would like to defer laboratory evaluation at this time and follow-up with his family doctor. We discussed return parameters.   Patient understands and agrees with this plan.    Risk  Prescription drug management. Disposition  Final diagnoses:   Hypertension     Time reflects when diagnosis was documented in both MDM as applicable and the Disposition within this note     Time User Action Codes Description Comment    9/8/2023  3:56 PM Serg Short Add [I10] Hypertension       ED Disposition     ED Disposition   Discharge    Condition   Stable    Date/Time   Fri Sep 8, 2023  3:56 PM    Comment   Frida Daily discharge to home/self care. Follow-up Information     Follow up With Specialties Details Why Contact Info    MANDY Tyler Nurse Practitioner, Family Medicine   [address] [facility] [address] [phone]            Patient's Medications   Discharge Prescriptions    AMLODIPINE (NORVASC) 5 MG TABLET    Take 1 tablet (5 mg total) by mouth daily       Start Date: 9/8/2023  End Date: --       Order Dose: 5 mg       Quantity: 21 tablet    Refills: 0       No discharge procedures on file.     PDMP Review     None          ED Provider  Electronically Signed by           Serg Short PA-C  09/08/23 2858

## 2023-09-11 ENCOUNTER — VBI (OUTPATIENT)
Dept: FAMILY MEDICINE CLINIC | Facility: CLINIC | Age: 58
End: 2023-09-11

## 2023-09-11 NOTE — TELEPHONE ENCOUNTER
09/11/23 11:47 AM    Patient contacted post ED visit, [facility] department spoke with patient/caregiver and outreach was successful. Thank you.   Chel Roy MA  [facility]

## 2023-09-13 ENCOUNTER — OFFICE VISIT (OUTPATIENT)
Dept: PHYSICAL THERAPY | Facility: CLINIC | Age: 58
End: 2023-09-13
Payer: COMMERCIAL

## 2023-09-13 DIAGNOSIS — M17.12 PRIMARY OSTEOARTHRITIS OF LEFT KNEE: Primary | ICD-10-CM

## 2023-09-13 PROCEDURE — 97112 NEUROMUSCULAR REEDUCATION: CPT

## 2023-09-13 PROCEDURE — 97110 THERAPEUTIC EXERCISES: CPT

## 2023-09-13 NOTE — PROGRESS NOTES
Daily Note     Today's date: 2023  Patient name: Eden Langston  : 1965  MRN: [id number]  Referring provider: Monica Mancera DO  Dx:   Encounter Diagnosis     ICD-10-CM    1. Primary osteoarthritis of left knee  M17.12                      Subjective: Pt reports knee pain 7/10. Objective: See treatment diary below      Assessment: Tolerated treatment well. Increased reps for some exercises today, and added fwd step downs, HR, and sit to stands. He reports no increase in pain. Patient demonstrated fatigue post treatment, exhibited good technique with therapeutic exercises and would benefit from continued PT      Plan: Continue per plan of care.       Precautions: B ORTIZ, CVA affecting L side    POC expires Auth Status Unit limit Start date  Expiration date PT/OT + Visit Limit?   10/27 no no   BOMN                   Manuals                                                                Neuro Re-Ed             Pt education about pathology, POC, HEP 8'            Quad sets 1x10 5" 1x10 5"           SLR 1x10 2x10           [place] TKE 1x15 5" 8# 1x25 5" 8#           Bridges  2x10                        Ther Ex             LAQs 2x10 2x10           S/L hip abd 1x10 2x10           Prone hip ext 1x10 2x10           SL leg press 2x10 45# 2x10 45#           Rec bike  5'           HR  2x10                                     Ther Activity             STS c airex  2x10            Step downs  2" 2x10           Gait Training                                       Modalities

## 2023-09-15 ENCOUNTER — APPOINTMENT (OUTPATIENT)
Dept: PHYSICAL THERAPY | Facility: CLINIC | Age: 58
End: 2023-09-15
Payer: COMMERCIAL

## 2023-09-20 ENCOUNTER — OFFICE VISIT (OUTPATIENT)
Dept: PHYSICAL THERAPY | Facility: CLINIC | Age: 58
End: 2023-09-20
Payer: COMMERCIAL

## 2023-09-20 DIAGNOSIS — M17.12 PRIMARY OSTEOARTHRITIS OF LEFT KNEE: Primary | ICD-10-CM

## 2023-09-20 PROCEDURE — 97112 NEUROMUSCULAR REEDUCATION: CPT

## 2023-09-20 PROCEDURE — 97110 THERAPEUTIC EXERCISES: CPT

## 2023-09-20 NOTE — PROGRESS NOTES
Daily Note     Today's date: 2023  Patient name: Filiberto Avery  : 1965  MRN: [id number]  Referring provider: Damon Kelsey DO  Dx:   Encounter Diagnosis     ICD-10-CM    1. Primary osteoarthritis of left knee  M17.12           Start Time: 930  Stop Time: 1015  Total time in clinic (min): 45 minutes    Subjective: Patient reports to physical therapy today stating his L knee has been feeling better since beginning therapy. Objective: See treatment diary below      Assessment: Tolerated treatment well. Patient demonstrated fatigue post treatment, exhibited good technique with therapeutic exercises and would benefit from continued PT. Patient provided good effort with performance of therapeutic exercises today. Patient responded well to the addition of backward walking c [place] machine today. Verbal cues were provided to correct hip rotation during s/l hip abduction exercise. Will continue to assess patient tolerance and progress next visit, as able. Plan: Continue per plan of care.       Precautions: B ORTIZ, CVA affecting L side    POC expires Auth Status Unit limit Start date  Expiration date PT/OT + Visit Limit?   10/27 no no   BOMN                   Manuals                                                               Neuro Re-Ed             Pt education about pathology, POC, HEP 8'            Quad sets 1x10 5" 1x10 5" 1x10 5"          SLR 1x10 2x10 2x10          [place] TKE 1x15 5" 8# 1x25 5" 8# 1x25 5" 8#          Bridges  2x10 2x10                       Ther Ex             LAQs 2x10 2x10 3x10          S/L hip abd 1x10 2x10 2x10          Prone hip ext 1x10 2x10 2x10          SL leg press 2x10 45# 2x10 45# 2x10 45#          Rec bike  5' 5'          HR  2x10 2x10          Backwards walking c [place]   1x10 10#                       Ther Activity             STS c airex  2x10  2x10          Step downs  2" 2x10 2" 2x10          Gait Training Modalities

## 2023-09-22 ENCOUNTER — OFFICE VISIT (OUTPATIENT)
Dept: PHYSICAL THERAPY | Facility: CLINIC | Age: 58
End: 2023-09-22
Payer: COMMERCIAL

## 2023-09-22 DIAGNOSIS — M87.052 AVASCULAR NECROSIS OF LEFT FEMUR (HCC): ICD-10-CM

## 2023-09-22 DIAGNOSIS — M17.12 PRIMARY OSTEOARTHRITIS OF LEFT KNEE: Primary | ICD-10-CM

## 2023-09-22 PROCEDURE — 97112 NEUROMUSCULAR REEDUCATION: CPT

## 2023-09-22 PROCEDURE — 97110 THERAPEUTIC EXERCISES: CPT

## 2023-09-22 NOTE — PROGRESS NOTES
Daily Note     Today's date: 2023  Patient name: Raz Christiansen  : 1965  MRN: [id number]  Referring provider: Sarita Dover DO  Dx:   Encounter Diagnosis     ICD-10-CM    1. Primary osteoarthritis of left knee  M17.12       2. Avascular necrosis of left femur ([address])  M87.052           Start Time: 0930  Stop Time: 1016  Total time in clinic (min): 46 minutes    Subjective: Pt reports he has been trying to depend on his cane less. Objective: See treatment diary below      Assessment: Tolerated treatment well. Patient demonstrated fatigue post treatment, exhibited good technique with therapeutic exercises and would benefit from continued PT. Cueing provided for quadriceps contraction with selected exercises. Able to progress resistance for standing terminal knee extension this session. Plan: Continue per plan of care. Progress treatment as tolerated.        Precautions: B ORTIZ, CVA affecting L side    POC expires Auth Status Unit limit Start date  Expiration date PT/OT + Visit Limit?   10/27 no no   BOMN                 Manuals                                                              Neuro Re-Ed             Pt education  8'   4'         Quad sets 1x10 5" 1x10 5" 1x10 5" NP         SLR 1x10 2x10 2x10 2x10         [place]uan A TKE 1x15 5" 8# 1x25 5" 8# 1x25 5" 8# 2x10 5" 15#         Bridges  2x10 2x10 3x10                      Ther Ex             LAQs 2x10 2x10 3x10 3x10         S/L hip abd 1x10 2x10 2x10 3x10         Prone hip ext 1x10 2x10 2x10 2x10         SL leg press 2x10 45# 2x10 45# 2x10 45# 2x10 45#         Rec bike  5' 5' 5'         HR  2x10 2x10          Backwards walking c [place]   1x10 10# 1x10 10#                      Ther Activity             STS c airex  2x10  2x10 2x10         Step downs  2" 2x10 2" 2x10 2" 2x10 lateral         Gait Training                                       Modalities

## 2023-09-27 ENCOUNTER — APPOINTMENT (OUTPATIENT)
Dept: PHYSICAL THERAPY | Facility: CLINIC | Age: 58
End: 2023-09-27
Payer: COMMERCIAL

## 2023-09-28 ENCOUNTER — APPOINTMENT (OUTPATIENT)
Dept: PHYSICAL THERAPY | Facility: CLINIC | Age: 58
End: 2023-09-28
Payer: COMMERCIAL

## 2023-10-12 ENCOUNTER — OFFICE VISIT (OUTPATIENT)
Dept: PHYSICAL THERAPY | Facility: CLINIC | Age: 58
End: 2023-10-12
Payer: COMMERCIAL

## 2023-10-12 DIAGNOSIS — M87.052 AVASCULAR NECROSIS OF LEFT FEMUR (HCC): ICD-10-CM

## 2023-10-12 DIAGNOSIS — M17.12 PRIMARY OSTEOARTHRITIS OF LEFT KNEE: Primary | ICD-10-CM

## 2023-10-12 PROCEDURE — 97110 THERAPEUTIC EXERCISES: CPT

## 2023-10-12 PROCEDURE — 97112 NEUROMUSCULAR REEDUCATION: CPT

## 2023-10-12 NOTE — PROGRESS NOTES
Daily Note     Today's date: 10/12/2023  Patient name: Jennyfer Romero  : 1965  MRN: [id number]  Referring provider: Jaleel Rendon DO  Dx:   Encounter Diagnosis     ICD-10-CM    1. Primary osteoarthritis of left knee  M17.12       2. Avascular necrosis of left femur (HCC)  M87.052                      Subjective: Pt reports no pain currently, but going up and down stairs hurts. Objective: See treatment diary below      Assessment: Tolerated treatment well. He denies increase in pain with exercises. Stairs are still painful for him. Cues needed at times for technique and reps. Patient demonstrated fatigue post treatment, exhibited good technique with therapeutic exercises, and would benefit from continued PT      Plan: Continue per plan of care.       Precautions: B ORTIZ, CVA affecting L side    POC expires Auth Status Unit limit Start date  Expiration date PT/OT + Visit Limit?   10/27 no no   BOMN                 Manuals 9/1 9/13 9/20 9/22 10/12                                                            Neuro Re-Ed             Pt education  8'   4'         Quad sets 1x10 5" 1x10 5" 1x10 5" NP         SLR 1x10 2x10 2x10 2x10 3x10        Juan A TKE 1x15 5" 8# 1x25 5" 8# 1x25 5" 8# 2x10 5" 15# 2x10 5" 15#        Bridges  2x10 2x10 3x10 3x10                     Ther Ex             LAQs 2x10 2x10 3x10 3x10 3x10        S/L hip abd 1x10 2x10 2x10 3x10 3x10        Prone hip ext 1x10 2x10 2x10 2x10 2x10        SL leg press 2x10 45# 2x10 45# 2x10 45# 2x10 45# 2x10 45#        Rec bike  5' 5' 5' 5'        HR  2x10 2x10  2x10        Backwards walking c [place]   1x10 10# 1x10 10# 1x10 10#                     Ther Activity             STS c airex  2x10  2x10 2x10 2x10        Step downs  2" 2x10 2" 2x10 2" 2x10 lateral 2" 2x10 lateral        Gait Training                                       Modalities

## 2023-10-18 ENCOUNTER — APPOINTMENT (OUTPATIENT)
Dept: PHYSICAL THERAPY | Facility: CLINIC | Age: 58
End: 2023-10-18
Payer: COMMERCIAL

## 2023-10-18 NOTE — PROGRESS NOTES
Daily Note     Today's date: 10/18/2023  Patient name: Donato Murphy  : 1965  MRN: [id number]  Referring provider: Ashley Irvin DO  Dx: No diagnosis found. Subjective: Patient reports to physical therapy today stating         Objective: See treatment diary below      Assessment: Tolerated treatment well. Patient demonstrated fatigue post treatment, exhibited good technique with therapeutic exercises, and would benefit from continued PT. Plan: Continue per plan of care.       Precautions: B ORTIZ, CVA affecting L side    POC expires Auth Status Unit limit Start date  Expiration date PT/OT + Visit Limit?   10/27 no no   BOMN                 Manuals 9/1 9/13 9/20 9/22 10/12 10/18                                                           Neuro Re-Ed             Pt education  8'   4'         Quad sets 1x10 5" 1x10 5" 1x10 5" NP         SLR 1x10 2x10 2x10 2x10 3x10        Juan A TKE 1x15 5" 8# 1x25 5" 8# 1x25 5" 8# 2x10 5" 15# 2x10 5" 15#        Bridges  2x10 2x10 3x10 3x10                     Ther Ex             LAQs 2x10 2x10 3x10 3x10 3x10        S/L hip abd 1x10 2x10 2x10 3x10 3x10        Prone hip ext 1x10 2x10 2x10 2x10 2x10        SL leg press 2x10 45# 2x10 45# 2x10 45# 2x10 45# 2x10 45#        Rec bike  5' 5' 5' 5'        HR  2x10 2x10  2x10        Backwards walking c [place]   1x10 10# 1x10 10# 1x10 10#                     Ther Activity             STS c airex  2x10  2x10 2x10 2x10        Step downs  2" 2x10 2" 2x10 2" 2x10 lateral 2" 2x10 lateral        Gait Training                                       Modalities

## 2023-10-20 ENCOUNTER — APPOINTMENT (OUTPATIENT)
Dept: PHYSICAL THERAPY | Facility: CLINIC | Age: 58
End: 2023-10-20
Payer: COMMERCIAL

## 2023-10-25 ENCOUNTER — OFFICE VISIT (OUTPATIENT)
Dept: PHYSICAL THERAPY | Facility: CLINIC | Age: 58
End: 2023-10-25
Payer: COMMERCIAL

## 2023-10-25 DIAGNOSIS — M87.052 AVASCULAR NECROSIS OF LEFT FEMUR (HCC): ICD-10-CM

## 2023-10-25 DIAGNOSIS — M17.12 PRIMARY OSTEOARTHRITIS OF LEFT KNEE: Primary | ICD-10-CM

## 2023-10-25 PROCEDURE — 97112 NEUROMUSCULAR REEDUCATION: CPT

## 2023-10-25 PROCEDURE — 97110 THERAPEUTIC EXERCISES: CPT

## 2023-10-25 NOTE — PROGRESS NOTES
Daily Note     Today's date: 10/25/2023  Patient name: Mara Keen  : 1965  MRN: [id number]  Referring provider: Tatyana Baird DO  Dx:   Encounter Diagnosis     ICD-10-CM    1. Primary osteoarthritis of left knee  M17.12       2. Avascular necrosis of left femur ([address])  M87.052           Start Time: 0930  Stop Time: 1015  Total time in clinic (min): 45 minutes    Subjective: Patient reports 0/10 pain today. Objective: See treatment diary below      Assessment: Tolerated treatment well. Patient participated in skilled PT session focused on strengthening, stretching, and ROM. Patient able to complete exercise program with no sx. Patient demonstrates improve L knee AROM pain free. Patient would continue to benefit from skilled PT interventions to address strengthening, stretching, and ROM. Patient demonstrated fatigue post treatment      Plan: Continue per plan of care.       Precautions: B ORTIZ, CVA affecting L side    POC expires Auth Status Unit limit Start date  Expiration date PT/OT + Visit Limit?   10/27 no no   BOMN                 Manuals 9/1 9/13 9/20 9/22 10/12 10/25                                                           Neuro Re-Ed             Pt education  8'   4'         Quad sets 1x10 5" 1x10 5" 1x10 5" NP         SLR 1x10 2x10 2x10 2x10 3x10 3x10       Juan A TKE 1x15 5" 8# 1x25 5" 8# 1x25 5" 8# 2x10 5" 15# 2x10 5" 15# 15# 5" 20x       Bridges  2x10 2x10 3x10 3x10 3x10                    Ther Ex             LAQs 2x10 2x10 3x10 3x10 3x10 3" 3x10       S/L hip abd 1x10 2x10 2x10 3x10 3x10 3x10       Prone hip ext 1x10 2x10 2x10 2x10 2x10 2x10       SL leg press 2x10 45# 2x10 45# 2x10 45# 2x10 45# 2x10 45# 45# 2x10       Rec bike  5' 5' 5' 5' 7'       HR  2x10 2x10  2x10 2x10       Backwards walking c [place]   1x10 10# 1x10 10# 1x10 10# 10# 10x                    Ther Activity             STS c airex  2x10  2x10 2x10 2x10 2x10       Step downs  2" 2x10 2" 2x10 2" 2x10 lateral 2" 2x10 lateral 4" step 2x10 Lateral ea       Gait Training                                       Modalities

## 2023-10-26 ENCOUNTER — OFFICE VISIT (OUTPATIENT)
Dept: PHYSICAL THERAPY | Facility: CLINIC | Age: 58
End: 2023-10-26
Payer: COMMERCIAL

## 2023-10-26 DIAGNOSIS — M17.12 PRIMARY OSTEOARTHRITIS OF LEFT KNEE: Primary | ICD-10-CM

## 2023-10-26 DIAGNOSIS — M87.052 AVASCULAR NECROSIS OF LEFT FEMUR (HCC): ICD-10-CM

## 2023-10-26 PROCEDURE — 97110 THERAPEUTIC EXERCISES: CPT

## 2023-10-26 PROCEDURE — 97530 THERAPEUTIC ACTIVITIES: CPT

## 2023-10-26 PROCEDURE — 97112 NEUROMUSCULAR REEDUCATION: CPT

## 2023-10-26 NOTE — PROGRESS NOTES
Daily Note     Today's date: 10/26/2023  Patient name: Jennyfer Romero  : 1965  MRN: [id number]  Referring provider: Jaleel Rendon DO  Dx:   Encounter Diagnosis     ICD-10-CM    1. Primary osteoarthritis of left knee  M17.12       2. Avascular necrosis of left femur (HCC)  M87.052                      Subjective: Patient noted no new complaints. Objective: See treatment diary below      Assessment: Tolerated treatment fair. Patient exhibited good technique with therapeutic exercises and would benefit from continued PT. Plan: Continue per plan of care.       Precautions: B ORTIZ, CVA affecting L side    POC expires Auth Status Unit limit Start date  Expiration date PT/OT + Visit Limit?   10/27 no no   BOMN                 Manuals 9/1 9/13 9/20 9/22 10/12 10/25 10/26                                                          Neuro Re-Ed             Pt education  8'   4'         Quad sets 1x10 5" 1x10 5" 1x10 5" NP         SLR 1x10 2x10 2x10 2x10 3x10 3x10 3 x 10      [place] TKE 1x15 5" 8# 1x25 5" 8# 1x25 5" 8# 2x10 5" 15# 2x10 5" 15# 15# 5" 20x 15# 5" 20x      Bridges  2x10 2x10 3x10 3x10 3x10 3 x 10                   Ther Ex             LAQs 2x10 2x10 3x10 3x10 3x10 3" 3x10 3" 3x10      S/L hip abd 1x10 2x10 2x10 3x10 3x10 3x10 3x10      Prone hip ext 1x10 2x10 2x10 2x10 2x10 2x10 2x10      SL leg press 2x10 45# 2x10 45# 2x10 45# 2x10 45# 2x10 45# 45# 2x10 45# 2x10      Rec bike  5' 5' 5' 5' 7' 7'      HR  2x10 2x10  2x10 2x10 2 x 10      Backwards walking c [place] A   1x10 10# 1x10 10# 1x10 10# 10# 10x 10# 10x                   Ther Activity             STS c airex  2x10  2x10 2x10 2x10 2x10 2x10      Step downs  2" 2x10 2" 2x10 2" 2x10 lateral 2" 2x10 lateral 4" step 2x10 Lateral ea 4" step 2x10 Lateral ea      Gait Training                                       Modalities PT CALLED 3 TIMES FOR VITALS. ASSUMED PT LEFT, PT LWBS AFTER TRIAGE

## 2023-11-01 ENCOUNTER — EVALUATION (OUTPATIENT)
Dept: PHYSICAL THERAPY | Facility: CLINIC | Age: 58
End: 2023-11-01
Payer: COMMERCIAL

## 2023-11-01 DIAGNOSIS — M87.052 AVASCULAR NECROSIS OF LEFT FEMUR (HCC): ICD-10-CM

## 2023-11-01 DIAGNOSIS — M17.12 PRIMARY OSTEOARTHRITIS OF LEFT KNEE: Primary | ICD-10-CM

## 2023-11-01 PROCEDURE — 97110 THERAPEUTIC EXERCISES: CPT

## 2023-11-01 PROCEDURE — 97112 NEUROMUSCULAR REEDUCATION: CPT

## 2023-11-01 PROCEDURE — 97530 THERAPEUTIC ACTIVITIES: CPT

## 2023-11-01 NOTE — PROGRESS NOTES
PT Re-Evaluation     Today's date: 2023  Patient name: Jenna Hill  : 1965  MRN: [id number]  Referring provider: Sylvia Grajeda DO  Dx:   Encounter Diagnosis     ICD-10-CM    1. Primary osteoarthritis of left knee  M17.12       2. Avascular necrosis of left femur (HCC)  M87.052           Start Time: 0930  Stop Time: 1015  Total time in clinic (min): 45 minutes    Assessment  Assessment details: Pt is a 63 y.o. male presenting to physical therapy with chief complaint of L knee pain. Upon re-evaluation, pt demonstrates improved L knee strength and ROM. Improved functional mobility with pt not ambulating with quad cane at this time and demonstrating adequate stability. Still some limitations of closed chain LE strength, resulting in limitations with transfers, standing, ambulation, stairs, and household activities. Will continue to address strength impairments in order to improve function. Pt is a good candidate for skilled PT to address impairments and facilitate return to prior level of function. Impairments: abnormal gait, abnormal or restricted ROM, activity intolerance, impaired balance, impaired physical strength and pain with function  Functional limitations: transfers, standing, ambulation, stairs, and household activities  Symptom irritability: moderate  Goals  STG 4 - weeks  1. Patient will demonstrate ability to perform sit to stand without UE assist or compensation to facilitate functional ability. -met  2. Patient will demonstrate improved L knee extension strength to 4/5 or better to facilitate functional ability. -met  LTG 8 - weeks  1. Patient will demonstrate decreased pain at worst with ascending stairs to 2/10 or better to facilitate functional ability. -not met  2.  Patient will demonstrate increased FOTO score by 10 points or more from baseline. -not met    Plan  Patient would benefit from: PT eval and skilled physical therapy  Planned modality interventions: cryotherapy, thermotherapy: hydrocollator packs and unattended electrical stimulation  Planned therapy interventions: activity modification, balance/weight bearing training, gait training, functional ROM exercises, home exercise program, joint mobilization, manual therapy, neuromuscular re-education, patient education, strengthening, stretching, therapeutic activities and therapeutic exercise  Frequency: 2x week  Duration in weeks: 8  Plan of Care beginning date: 2023  Plan of Care expiration date: 2023  Treatment plan discussed with: patient        Subjective Evaluation    History of Present Illness  Mechanism of injury: Pt reports prior hx of CVAs that affected the L side of his body. Pt has had L hip replaced in the past, and had R hip replaced this year and attended physical therapy for this. Pt has been noting worsening L knee pain for over a year, obtained script from Dr. Chel Oro. Pt received MRI earlier this year which showed multifocal AVN tricompartmental osteoarthritis and low-grade partial-thickness tear of the PCL and popliteus muscle. Pt has full flight of stairs inside the house with L handrail which sometimes gives him trouble and he has to pull himself up, more difficulty going up than down. Pt also reports difficulty with getting up from chair. Upon re-evaluation, pt reports his L leg has been feeling stronger. Pt reports doing step over step going up and sometimes doing step-to-step going down.   Patient Goals  Patient goals for therapy: decreased pain  Patient goal: improve walking and stairs  Pain  Current pain ratin  At best pain rating: 3  At worst pain ratin  Location: L knee  Quality: dull ache  Relieving factors: rest and relaxation  Aggravating factors: standing, walking and stair climbing  Progression: worsening          Objective     Palpation     Additional Palpation Details  Min restriction B hamstrings    Tenderness     Additional Tenderness Details  No TTP    Active Range of Motion Left Knee   Flexion: 123 degrees with pain  Extension: 0 degrees   Extensor la degrees     Right Knee   Flexion: 128 degrees     Additional Active Range of Motion Details  Extensor lag measured with SLR    Strength/Myotome Testing     Left Hip   Planes of Motion   Flexion: 4  Extension: 4+  Abduction: 4    Right Hip   Planes of Motion   Flexion: 4+  Extension: 5  Abduction: 4+    Left Knee   Flexion: 4  Extension: 4    Right Knee   Flexion: 4+  Extension: 4+    Ambulation     Observational Gait   Gait: antalgic     Additional Observational Gait Details  Ambulates with SBQC in R hand             Precautions: B ORTIZ, CVA affecting L side    POC expires Auth Status Unit limit Start date  Expiration date PT/OT + Visit Limit?    no no   BOMN                 Manuals 9/1 9/13 9/20 9/22 10/12 10/25 10/26 11/1                                                         Neuro Re-Ed             Pt education  8'   4'    3'     Quad sets 1x10 5" 1x10 5" 1x10 5" NP         SLR 1x10 2x10 2x10 2x10 3x10 3x10 3 x 10 3 x 10     [place]uan A TKE 1x15 5" 8# 1x25 5" 8# 1x25 5" 8# 2x10 5" 15# 2x10 5" 15# 15# 5" 20x 15# 5" 20x      Bridges  2x10 2x10 3x10 3x10 3x10 3 x 10      Sled push TKE        2 laps     Ther Ex             LAQs 2x10 2x10 3x10 3x10 3x10 3" 3x10 3" 3x10      S/L hip abd 1x10 2x10 2x10 3x10 3x10 3x10 3x10 3x10     Prone hip ext 1x10 2x10 2x10 2x10 2x10 2x10 2x10 3x10     SL leg press 2x10 45# 2x10 45# 2x10 45# 2x10 45# 2x10 45# 45# 2x10 45# 2x10      Rec bike  5' 5' 5' 5' 7' 7' 7'     HR  2x10 2x10  2x10 2x10 2 x 10      Backwards walking c Juan A belt   1x10 10# 1x10 10# 1x10 10# 10# 10x 10# 10x 1x10 20#-25#                   Ther Activity             STS c airex  2x10  2x10 2x10 2x10 2x10 2x10 3x10     Step downs  2" 2x10 2" 2x10 2" 2x10 lateral 2" 2x10 lateral 4" step 2x10 Lateral ea 4" step 2x10 Lateral ea 4" step 3x10 Lateral ea     Gait Training                                       Modalities

## 2023-11-03 ENCOUNTER — OFFICE VISIT (OUTPATIENT)
Dept: PHYSICAL THERAPY | Facility: CLINIC | Age: 58
End: 2023-11-03
Payer: COMMERCIAL

## 2023-11-03 DIAGNOSIS — M87.052 AVASCULAR NECROSIS OF LEFT FEMUR (HCC): ICD-10-CM

## 2023-11-03 DIAGNOSIS — M17.12 PRIMARY OSTEOARTHRITIS OF LEFT KNEE: Primary | ICD-10-CM

## 2023-11-03 PROCEDURE — 97530 THERAPEUTIC ACTIVITIES: CPT

## 2023-11-03 PROCEDURE — 97112 NEUROMUSCULAR REEDUCATION: CPT

## 2023-11-03 PROCEDURE — 97110 THERAPEUTIC EXERCISES: CPT

## 2023-11-03 NOTE — PROGRESS NOTES
Daily Note     Today's date: 11/3/2023  Patient name: Lisbet Solitario  : 1965  MRN: [id number]  Referring provider: Shira Lagos DO  Dx:   Encounter Diagnosis     ICD-10-CM    1. Primary osteoarthritis of left knee  M17.12       2. Avascular necrosis of left femur ([address])  M87.052           Start Time: 0845  Stop Time: 0930  Total time in clinic (min): 45 minutes    Subjective: Patient reports his L knee is really hurting this morning. Patient rates pain as 7/10. Objective: See treatment diary below      Assessment: Tolerated treatment well. Patient participated in skilled PT session focused on strengthening, stretching, and ROM. Patient able to complete exercise program with a decrease in pain. Patient continues to progress well with exercise program.  Patient does continue to experience pain in L knee. Patient would continue to benefit from skilled PT interventions to address strengthening, stretching, and ROM. Patient demonstrated fatigue post treatment      Plan: Continue per plan of care.       Precautions: B ORTIZ, CVA affecting L side    POC expires Auth Status Unit limit Start date  Expiration date PT/OT + Visit Limit?    no no   BOMN                 Manuals 9/1 9/13 9/20 9/22 10/12 10/25 10/26 11/1 11/3                                                        Neuro Re-Ed             Pt education  8'   4'    3'     Quad sets 1x10 5" 1x10 5" 1x10 5" NP     Lknee  5" 20x    SLR 1x10 2x10 2x10 2x10 3x10 3x10 3 x 10 3 x 10 3x10    [place]uan A TKE 1x15 5" 8# 1x25 5" 8# 1x25 5" 8# 2x10 5" 15# 2x10 5" 15# 15# 5" 20x 15# 5" 20x      Bridges  2x10 2x10 3x10 3x10 3x10 3 x 10      Sled push TKE        2 laps 2 laps    Ther Ex             LAQs 2x10 2x10 3x10 3x10 3x10 3" 3x10 3" 3x10  2# L AW 5" 20x    S/L hip abd 1x10 2x10 2x10 3x10 3x10 3x10 3x10 3x10 3x10    Prone hip ext 1x10 2x10 2x10 2x10 2x10 2x10 2x10 3x10 3x10    SL leg press 2x10 45# 2x10 45# 2x10 45# 2x10 45# 2x10 45# 45# 2x10 45# 2x10      Rec bike 5' 5' 5' 5' 7' 7' 7' 7'    HR  2x10 2x10  2x10 2x10 2 x 10  HR/TR 30x ea    Backwards walking c [place] belt   1x10 10# 1x10 10# 1x10 10# 10# 10x 10# 10x 1x10 20#-25#  20# 10x                 Ther Activity             STS c airex  2x10  2x10 2x10 2x10 2x10 2x10 3x10 3x10     Step downs  2" 2x10 2" 2x10 2" 2x10 lateral 2" 2x10 lateral 4" step 2x10 Lateral ea 4" step 2x10 Lateral ea 4" step 3x10 Lateral ea 4" step Lateral 3x10    Gait Training                                       Modalities

## 2023-11-07 ENCOUNTER — APPOINTMENT (OUTPATIENT)
Dept: PHYSICAL THERAPY | Facility: CLINIC | Age: 58
End: 2023-11-07
Payer: COMMERCIAL

## 2023-11-08 ENCOUNTER — APPOINTMENT (OUTPATIENT)
Dept: PHYSICAL THERAPY | Facility: CLINIC | Age: 58
End: 2023-11-08
Payer: COMMERCIAL

## 2023-11-10 ENCOUNTER — OFFICE VISIT (OUTPATIENT)
Dept: PHYSICAL THERAPY | Facility: CLINIC | Age: 58
End: 2023-11-10
Payer: COMMERCIAL

## 2023-11-10 DIAGNOSIS — M17.12 PRIMARY OSTEOARTHRITIS OF LEFT KNEE: Primary | ICD-10-CM

## 2023-11-10 DIAGNOSIS — M87.052 AVASCULAR NECROSIS OF LEFT FEMUR (HCC): ICD-10-CM

## 2023-11-10 PROCEDURE — 97530 THERAPEUTIC ACTIVITIES: CPT

## 2023-11-10 PROCEDURE — 97110 THERAPEUTIC EXERCISES: CPT

## 2023-11-10 PROCEDURE — 97112 NEUROMUSCULAR REEDUCATION: CPT

## 2023-11-10 NOTE — PROGRESS NOTES
Daily Note     Today's date: 11/10/2023  Patient name: Leti Rose  : 1965  MRN: [id number]  Referring provider: Sandip Macario DO  Dx:   Encounter Diagnosis     ICD-10-CM    1. Primary osteoarthritis of left knee  M17.12       2. Avascular necrosis of left femur ([address])  M87.052           Start Time: 8602  Stop Time: 0935  Total time in clinic (min): 40 minutes    Subjective: Pt feels a bit more knee pain today which he believes is due to the cold weather. Objective: See treatment diary below      Assessment: Tolerated treatment well. Patient demonstrated fatigue post treatment, exhibited good technique with therapeutic exercises, and would benefit from continued PT. Pt demonstrating a bit more difficulty when doing sit to stands this visit and compensating with hands on knees, so decreased repetitions. Rest of program kept consistent due to pt being adequately challenged by exercises. Plan: Continue per plan of care.       Precautions: B ORTIZ, CVA affecting L side    POC expires Auth Status Unit limit Start date  Expiration date PT/OT + Visit Limit?    no no   BOMN                 Manuals 9/1 9/13 9/20 9/22 10/12 10/25 10/26 11/1 11/3 11/10                                                       Neuro Re-Ed             Pt education  8'   4'    3'     Quad sets 1x10 5" 1x10 5" 1x10 5" NP     Lknee  5" 20x    SLR 1x10 2x10 2x10 2x10 3x10 3x10 3 x 10 3 x 10 3x10 3x10   [place]uan A TKE 1x15 5" 8# 1x25 5" 8# 1x25 5" 8# 2x10 5" 15# 2x10 5" 15# 15# 5" 20x 15# 5" 20x      Bridges  2x10 2x10 3x10 3x10 3x10 3 x 10      Sled push TKE        2 laps 2 laps 2 laps   Ther Ex             LAQs 2x10 2x10 3x10 3x10 3x10 3" 3x10 3" 3x10  2# L AW 5" 20x 20x5" 2#   S/L hip abd 1x10 2x10 2x10 3x10 3x10 3x10 3x10 3x10 3x10 3x10   Prone hip ext 1x10 2x10 2x10 2x10 2x10 2x10 2x10 3x10 3x10 3x10   SL leg press 2x10 45# 2x10 45# 2x10 45# 2x10 45# 2x10 45# 45# 2x10 45# 2x10   45# 2x10   Rec bike  5' 5' 5' 5' 7' 7' 7' 7' 7'   HR 2x10 2x10  2x10 2x10 2 x 10  HR/TR 30x ea HR/TR 30x ea   Backwards walking c [place] belt   1x10 10# 1x10 10# 1x10 10# 10# 10x 10# 10x 1x10 20#-25#  20# 10x 20#-25# 10x                Ther Activity             STS c airex  2x10  2x10 2x10 2x10 2x10 2x10 3x10 3x10  2x10   Step downs  2" 2x10 2" 2x10 2" 2x10 lateral 2" 2x10 lateral 4" step 2x10 Lateral ea 4" step 2x10 Lateral ea 4" step 3x10 Lateral ea 4" step Lateral 3x10 4" lateral 3x10   Gait Training                                       Modalities

## 2023-11-15 ENCOUNTER — APPOINTMENT (OUTPATIENT)
Dept: PHYSICAL THERAPY | Facility: CLINIC | Age: 58
End: 2023-11-15
Payer: COMMERCIAL

## 2023-11-15 NOTE — PROGRESS NOTES
Daily Note     Today's date: 11/15/2023  Patient name: Trav Mehta  : 1965  MRN: [id number]  Referring provider: Alonso Paula DO  Dx: No diagnosis found. Subjective: ***      Objective: See treatment diary below      Assessment: Tolerated treatment {Tolerated treatment :[id number]}.  Patient {assessment:[id number]}      Plan: {PLAN:[id number]}     Precautions: B ORTIZ, CVA affecting L side    POC expires Auth Status Unit limit Start date  Expiration date PT/OT + Visit Limit?    no no   BOMN                 Manuals 9/1 9/13 9/20 9/22 10/12 10/25 10/26 11/1 11/3 11/10                                                       Neuro Re-Ed             Pt education  8'   4'    3'     Quad sets 1x10 5" 1x10 5" 1x10 5" NP     Lknee  5" 20x    SLR 1x10 2x10 2x10 2x10 3x10 3x10 3 x 10 3 x 10 3x10 3x10   Juan A TKE 1x15 5" 8# 1x25 5" 8# 1x25 5" 8# 2x10 5" 15# 2x10 5" 15# 15# 5" 20x 15# 5" 20x      Bridges  2x10 2x10 3x10 3x10 3x10 3 x 10      Sled push TKE        2 laps 2 laps 2 laps   Ther Ex             LAQs 2x10 2x10 3x10 3x10 3x10 3" 3x10 3" 3x10  2# L AW 5" 20x 20x5" 2#   S/L hip abd 1x10 2x10 2x10 3x10 3x10 3x10 3x10 3x10 3x10 3x10   Prone hip ext 1x10 2x10 2x10 2x10 2x10 2x10 2x10 3x10 3x10 3x10   SL leg press 2x10 45# 2x10 45# 2x10 45# 2x10 45# 2x10 45# 45# 2x10 45# 2x10   45# 2x10   Rec bike  5' 5' 5' 5' 7' 7' 7' 7' 7'   HR  2x10 2x10  2x10 2x10 2 x 10  HR/TR 30x ea HR/TR 30x ea   Backwards walking c [place] belt   1x10 10# 1x10 10# 1x10 10# 10# 10x 10# 10x 1x10 20#-25#  20# 10x 20#-25# 10x                Ther Activity             STS c airex  2x10  2x10 2x10 2x10 2x10 2x10 3x10 3x10  2x10   Step downs  2" 2x10 2" 2x10 2" 2x10 lateral 2" 2x10 lateral 4" step 2x10 Lateral ea 4" step 2x10 Lateral ea 4" step 3x10 Lateral ea 4" step Lateral 3x10 4" lateral 3x10   Gait Training                                       Modalities

## 2023-11-17 ENCOUNTER — OFFICE VISIT (OUTPATIENT)
Dept: PHYSICAL THERAPY | Facility: CLINIC | Age: 58
End: 2023-11-17
Payer: COMMERCIAL

## 2023-11-17 DIAGNOSIS — M87.052 AVASCULAR NECROSIS OF LEFT FEMUR (HCC): ICD-10-CM

## 2023-11-17 DIAGNOSIS — M17.12 PRIMARY OSTEOARTHRITIS OF LEFT KNEE: Primary | ICD-10-CM

## 2023-11-17 PROCEDURE — 97110 THERAPEUTIC EXERCISES: CPT

## 2023-11-17 PROCEDURE — 97112 NEUROMUSCULAR REEDUCATION: CPT

## 2023-11-17 PROCEDURE — 97530 THERAPEUTIC ACTIVITIES: CPT

## 2023-11-17 NOTE — PROGRESS NOTES
Daily Note     Today's date: 2023  Patient name: Marito Singh  : 1965  MRN: [id number]  Referring provider: Javid Hernandes DO  Dx:   Encounter Diagnosis     ICD-10-CM    1. Primary osteoarthritis of left knee  M17.12       2. Avascular necrosis of left femur ([address])  M87.052           Start Time: 0930  Stop Time: 1014  Total time in clinic (min): 44 minutes    Subjective: Pt reports feeling more soreness in his L knee today. Objective: See treatment diary below      Assessment: Tolerated treatment well. Patient demonstrated fatigue post treatment, exhibited good technique with therapeutic exercises, and would benefit from continued PT. Modified session due to pt's complaint of L knee pain and performed sit to stands on higher surface at high/low table and pt had better tolerance to this, less compensation with pushing hands on knees. Also decreased step height for step downs with better tolerance. Plan: Continue per plan of care. Progress treatment as tolerated.        Precautions: B ORTIZ, CVA affecting L side    POC expires Auth Status Unit limit Start date  Expiration date PT/OT + Visit Limit?    no no   BOMN                 Manuals 11/17    10/12 10/25 10/26 11/1 11/3 11/10                                                       Neuro Re-Ed             Pt education  2'       3'     Quad sets         Lknee  5" 20x    SLR 3x10    3x10 3x10 3 x 10 3 x 10 3x10 3x10   [place] TKE     2x10 5" 15# 15# 5" 20x 15# 5" 20x      Bridges     3x10 3x10 3 x 10      Sled push TKE        2 laps 2 laps 2 laps   Ther Ex             LAQs x30    3x10 3" 3x10 3" 3x10  2# L AW 5" 20x 20x5" 2#   S/L hip abd 3x10    3x10 3x10 3x10 3x10 3x10 3x10   Prone hip ext 3x10    2x10 2x10 2x10 3x10 3x10 3x10   SL leg press 45# 2x10    2x10 45# 45# 2x10 45# 2x10   45# 2x10   Rec bike 7'    5' 7' 7' 7' 7' 7'   HR     2x10 2x10 2 x 10  HR/TR 30x ea HR/TR 30x ea   Backwards walking c [place] belt 1x10 25#    1x10 10# 10# 10x 10# 10x 1x10 20#-25#  20# 10x 20#-25# 10x                Ther Activity             STS c airex 3x10 hi low table44    2x10 2x10 2x10 3x10 3x10  2x10   Step downs 2" lateral 3x10    2" 2x10 lateral 4" step 2x10 Lateral ea 4" step 2x10 Lateral ea 4" step 3x10 Lateral ea 4" step Lateral 3x10 4" lateral 3x10   Gait Training                                       Modalities

## 2023-11-24 ENCOUNTER — OFFICE VISIT (OUTPATIENT)
Dept: PHYSICAL THERAPY | Facility: CLINIC | Age: 58
End: 2023-11-24
Payer: COMMERCIAL

## 2023-11-24 DIAGNOSIS — M87.052 AVASCULAR NECROSIS OF LEFT FEMUR (HCC): ICD-10-CM

## 2023-11-24 DIAGNOSIS — M17.12 PRIMARY OSTEOARTHRITIS OF LEFT KNEE: Primary | ICD-10-CM

## 2023-11-24 PROCEDURE — 97112 NEUROMUSCULAR REEDUCATION: CPT

## 2023-11-24 PROCEDURE — 97110 THERAPEUTIC EXERCISES: CPT

## 2023-11-24 PROCEDURE — 97530 THERAPEUTIC ACTIVITIES: CPT

## 2023-11-24 NOTE — PROGRESS NOTES
Daily Note     Today's date: 2023  Patient name: Jovita Swan  : 1965  MRN: [id number]  Referring provider: Jacki Freeman DO  Dx:   Encounter Diagnosis     ICD-10-CM    1. Primary osteoarthritis of left knee  M17.12       2. Avascular necrosis of left femur (HCC)  M87.052                      Subjective: Pt reports his leg has been giving out on him since Tuesday, it feels like its in his L quad and it is painful and sore. Objective: See treatment diary below      Assessment: Tolerated treatment well. More pain noted with SLR flex and abd. Prone knee flexion stretch was initiated today. Increased height of table for sit to stands due to pain and difficulty. Leg press was also more painful today. He was encouraged to let his leg rest and recover from being on his feet a lot the last few days. He reports slightly less pain and soreness after session today. Patient demonstrated fatigue post treatment, exhibited good technique with therapeutic exercises, and would benefit from continued PT      Plan: Continue per plan of care. Precautions: B ORTIZ, CVA affecting L side    POC expires Auth Status Unit limit Start date  Expiration date PT/OT + Visit Limit?    no no 23 BOMN                 Manuals 11/17 11/24   10/12 10/25 10/26 11/1 11/3 11/10                                                       Neuro Re-Ed             Pt education  2' 2'      3'     Quad sets         Lknee  5" 20x    SLR 3x10 x30   3x10 3x10 3 x 10 3 x 10 3x10 3x10   [place] TKE     2x10 5" 15# 15# 5" 20x 15# 5" 20x      Bridges     3x10 3x10 3 x 10      Sled push TKE        2 laps 2 laps 2 laps   Ther Ex             LAQs x30 x30   3x10 3" 3x10 3" 3x10  2# L AW 5" 20x 20x5" 2#   S/L hip abd 3x10 X15 p!    3x10 3x10 3x10 3x10 3x10 3x10   Prone hip ext 3x10 x30   2x10 2x10 2x10 3x10 3x10 3x10   SL leg press 45# 2x10 45# 2x10   2x10 45# 45# 2x10 45# 2x10   45# 2x10   Rec bike 7' 7'   5' 7' 7' 7' 7' 7'   HR/TR  30x ea 2x10 2x10 2 x 10  HR/TR 30x ea HR/TR 30x ea   Backwards walking c [place] belt 1x10 25# 1x10 25#   1x10 10# 10# 10x 10# 10x 1x10 20#-25#  20# 10x 20#-25# 10x   Prone knee flexion stretch  10"x10           Ther Activity             STS c airex 3x10 hi low table44 2x10 elevated hi-lo   2x10 2x10 2x10 3x10 3x10  2x10   Step downs 2" lateral 3x10 2" lateral 3x10   2" 2x10 lateral 4" step 2x10 Lateral ea 4" step 2x10 Lateral ea 4" step 3x10 Lateral ea 4" step Lateral 3x10 4" lateral 3x10   Gait Training                                       Modalities

## 2023-11-29 ENCOUNTER — APPOINTMENT (OUTPATIENT)
Dept: PHYSICAL THERAPY | Facility: CLINIC | Age: 58
End: 2023-11-29
Payer: COMMERCIAL

## 2023-12-01 ENCOUNTER — OFFICE VISIT (OUTPATIENT)
Dept: PHYSICAL THERAPY | Facility: CLINIC | Age: 58
End: 2023-12-01
Payer: COMMERCIAL

## 2023-12-01 DIAGNOSIS — M87.052 AVASCULAR NECROSIS OF LEFT FEMUR (HCC): ICD-10-CM

## 2023-12-01 DIAGNOSIS — M17.12 PRIMARY OSTEOARTHRITIS OF LEFT KNEE: Primary | ICD-10-CM

## 2023-12-01 PROCEDURE — 97110 THERAPEUTIC EXERCISES: CPT

## 2023-12-01 PROCEDURE — 97112 NEUROMUSCULAR REEDUCATION: CPT

## 2023-12-01 NOTE — PROGRESS NOTES
Daily Note     Today's date: 2023  Patient name: Jeb Riggs  : 1965  MRN: [id number]  Referring provider: Breanne Olmstead DO  Dx:   Encounter Diagnosis     ICD-10-CM    1. Primary osteoarthritis of left knee  M17.12       2. Avascular necrosis of left femur ([address])  M87.052           Start Time: 0845  Stop Time: 7171  Total time in clinic (min): 46 minutes    Subjective: Pt reports he has been getting muscle spasms in his L quad. Objective: See treatment diary below      Assessment: Tolerated treatment well. Patient demonstrated fatigue post treatment, exhibited good technique with therapeutic exercises, and would benefit from continued PT. Performed additional quadriceps stretching and advised on lumbar spine possibly contributing to muscle spasms and radicular pain. Pt tolerated lumbar extensions well and will assess response when pt demonstrates LLE sx. Plan: Continue per plan of care. Progress treatment as tolerated.        Precautions: B ORTIZ, CVA affecting L side    POC expires Auth Status Unit limit Start date  Expiration date PT/OT + Visit Limit?    no no 23 BOMN                 Manuals 11/17 11/24 12/1     11/1 11/3 11/10                                                       Neuro Re-Ed             Pt education  2' 2' 2'     3'     Quad sets         Lknee  5" 20x    SLR 3x10 x30 x30     3 x 10 3x10 3x10   [place] TKE             Bridges             Sled push TKE        2 laps 2 laps 2 laps   Ther Ex             LAQs x30 x30 3x10      2# L AW 5" 20x 20x5" 2#   S/L hip abd 3x10 X15 p! x20     3x10 3x10 3x10   Prone hip ext 3x10 x30      3x10 3x10 3x10   SL leg press 45# 2x10 45# 2x10 45# 2x10       45# 2x10   Rec bike 7' 7' 7' nustep lvl 3     7' 7' 7'   HR/TR  30x ea 30x ea      HR/TR 30x ea HR/TR 30x ea   Backwards walking c [place] belt 1x10 25# 1x10 25# 1x10 25#     1x10 20#-25#  20# 10x 20#-25# 10x   Prone knee flexion stretch  10"x10 5x30"          Standing lumbar extension   1x15          Ther Activity             STS c airex 3x10 hi low table44 2x10 elevated hi-lo 3x10 hi lo table     3x10 3x10  2x10   Step downs 2" lateral 3x10 2" lateral 3x10      4" step 3x10 Lateral ea 4" step Lateral 3x10 4" lateral 3x10   Gait Training                                       Modalities

## 2023-12-06 ENCOUNTER — OFFICE VISIT (OUTPATIENT)
Dept: PHYSICAL THERAPY | Facility: CLINIC | Age: 58
End: 2023-12-06
Payer: COMMERCIAL

## 2023-12-06 DIAGNOSIS — M17.12 PRIMARY OSTEOARTHRITIS OF LEFT KNEE: Primary | ICD-10-CM

## 2023-12-06 DIAGNOSIS — M87.052 AVASCULAR NECROSIS OF LEFT FEMUR (HCC): ICD-10-CM

## 2023-12-06 PROCEDURE — 97110 THERAPEUTIC EXERCISES: CPT

## 2023-12-06 PROCEDURE — 97112 NEUROMUSCULAR REEDUCATION: CPT

## 2023-12-06 NOTE — PROGRESS NOTES
Daily Note     Today's date: 2023  Patient name: Rahat Ibrahim  : 1965  MRN: [id number]  Referring provider: Huma Zabala DO  Dx:   Encounter Diagnosis     ICD-10-CM    1. Primary osteoarthritis of left knee  M17.12       2. Avascular necrosis of left femur ([address])  M87.052           Start Time: 0930  Stop Time: 1016  Total time in clinic (min): 46 minutes    Subjective: Pt reports his L leg has not been giving out on him, not exactly sure why, thinks it may be weather related. Objective: See treatment diary below      Assessment: Tolerated treatment well. Patient demonstrated fatigue post treatment, exhibited good technique with therapeutic exercises, and would benefit from continued PT. Pt did better with strengthening exercises this session, able to add light resistance for leg raising exercises. Pt demonstrates fatigue at end of session, no reported soreness. Plan: Continue per plan of care. Progress treatment as tolerated.        Precautions: B ORTIZ, CVA affecting L side    POC expires Auth Status Unit limit Start date  Expiration date PT/OT + Visit Limit?    no no 23 BOMN                 Manuals 11/17 11/24 12/1 12/6    11/1 11/3 11/10                                                       Neuro Re-Ed             Pt education  2' 2' 2' 3'    3'     Quad sets         Lknee  5" 20x    SLR 3x10 x30 x30 X30; 1x10 1#    3 x 10 3x10 3x10   [place] TKE             Bridges             Sled push TKE        2 laps 2 laps 2 laps   Ther Ex             LAQs x30 x30 3x10      2# L AW 5" 20x 20x5" 2#   S/L hip abd 3x10 X15 p! x20 3x10 1#    3x10 3x10 3x10   Prone hip ext 3x10 x30  3x10 1#    3x10 3x10 3x10   SL leg press 45# 2x10 45# 2x10 45# 2x10 45# 2x10      45# 2x10   Rec bike 7' 7' 7' nustep lvl 3 7' lvl 3    7' 7' 7'   HR/TR  30x ea 30x ea 30x ea     HR/TR 30x ea HR/TR 30x ea   Backwards walking c [place] belt 1x10 25# 1x10 25# 1x10 25# 1x10 25#    1x10 20#-25#  20# 10x 20#-25# 10x Prone knee flexion stretch  10"x10 5x30" 5x30"         Standing lumbar extension   1x15 1x15         Ther Activity             STS c airex 3x10 hi low table44 2x10 elevated hi-lo 3x10 hi lo table 3x10 hi lo table    3x10 3x10  2x10   Step downs 2" lateral 3x10 2" lateral 3x10      4" step 3x10 Lateral ea 4" step Lateral 3x10 4" lateral 3x10   Gait Training                                       Modalities

## 2023-12-08 ENCOUNTER — TELEPHONE (OUTPATIENT)
Dept: GASTROENTEROLOGY | Facility: CLINIC | Age: 58
End: 2023-12-08

## 2023-12-15 ENCOUNTER — APPOINTMENT (OUTPATIENT)
Dept: PHYSICAL THERAPY | Facility: CLINIC | Age: 58
End: 2023-12-15
Payer: COMMERCIAL

## 2024-12-28 ENCOUNTER — VBI (OUTPATIENT)
Dept: ADMINISTRATIVE | Facility: OTHER | Age: 59
End: 2024-12-28

## 2024-12-28 NOTE — TELEPHONE ENCOUNTER
12/28/24 11:01 AM     Chart reviewed for Hemoglobin A1c ; nothing is submitted to the patient's insurance at this time.     Ignacia Mc   [facility]